# Patient Record
Sex: FEMALE | Race: WHITE | NOT HISPANIC OR LATINO | Employment: FULL TIME | URBAN - METROPOLITAN AREA
[De-identification: names, ages, dates, MRNs, and addresses within clinical notes are randomized per-mention and may not be internally consistent; named-entity substitution may affect disease eponyms.]

---

## 2024-06-05 NOTE — PROGRESS NOTES
"PCP  No primary care provider on file.         CHIEF COMPLAINT:  Bladder prolapse           HISTORY OF PRESENT ILLNESS:    Bladder prolapse    Patient's history was reviewed. This is a 58 y.o. female who presents with bladder prolapse. She has a history of JESSY about 2-3 years ago. She then lost weight, which fixed her JESSY problem, however, in February, her JESSY returned due to bladder prolapse. She is splinting for urination and bowel movements. She uses a panty liner daily, but does not soak through. She had a hysterectomy 20 years ago. She has had multiple abdominal surgeries including endometriosis surgery, bowel adhesions, hiatal hernia.       OB History    No obstetric history on file.         Past Medical History:   Diagnosis Date    Personal history of other diseases of the digestive system 11/15/2016    History of intestinal obstruction    Personal history of other diseases of the female genital tract 11/15/2016    History of endometriosis    Personal history of urinary calculi 11/15/2016    History of renal calculi    Unspecified ectopic pregnancy without intrauterine pregnancy ([facility]) 11/15/2016    Ectopic pregnancy       Past Surgical History:   Procedure Laterality Date    HERNIA REPAIR  04/23/2018    Hernia Repair    HYSTERECTOMY  11/15/2016    Hysterectomy    KNEE SURGERY  11/15/2016    Knee Surgery    OTHER SURGICAL HISTORY  07/24/2018    Esophagogastric Fundoplasty Nissen Fundoplication Robotic-Assisted       No family history on file.    Review of Systems   Endocrine: Positive for cold intolerance and heat intolerance.   Neurological:  Positive for dizziness.   Psychiatric/Behavioral:          Depression   All other systems reviewed and are negative.              PHYSICAL EXAMINATION:  No LMP recorded.  Body mass index is 23.86 kg/m².  Visit Vitals  /76   Pulse 73   Ht 1.626 m (5' 4\")   Wt 63 kg (139 lb)   BMI 23.86 kg/m²   BSA 1.69 m²     NP: Constitutional: alert and in no acute distress, " well developed, well nourished.   Head and Face: head and face: unremarkable.   Neck: no neck asymmetry, supple.   Pulmonary: no respiratory distress, unremarkable respiratory effort.   Skin: normal skin color and pigmentation, normal skin turgor, and no rash.   Neurologic: cranial nerves: non-focal, grossly intact.   Psychiatric: alert and oriented x 3.     Pelvic:  Sexual Maturity: Normal.   External Genitalia: Unremarkable.   Urethra: Hypermobile. -CST when sitting.   Vagina:   POP-Q: Anterior prolapse, as well as, apical and posterior prolapse.  Aa: +3 Ba: +3 C: -2.5 Gh: 4.5 Pb: 2.5 TVL: 8 Ap: -1.5 Bp: -1.5 D: -3.5  Rectum: Unremarkable.   Anus: Unremarkable.   Perianal area: Unremarkable.       Urine dip:   Recent Results (from the past 6 hour(s))   POCT UA Automated manually resulted    Collection Time: 06/07/24  3:34 PM   Result Value Ref Range    POC Color, Urine Yellow Straw, Yellow, Light-Yellow    POC Appearance, Urine Clear Clear    POC Glucose, Urine NEGATIVE NEGATIVE mg/dl    POC Bilirubin, Urine NEGATIVE NEGATIVE    POC Ketones, Urine NEGATIVE NEGATIVE mg/dl    POC Specific Gravity, Urine 1.025 1.005 - 1.035    POC Blood, Urine NEGATIVE NEGATIVE    POC PH, Urine 5.5 No Reference Range Established PH    POC Protein, Urine NEGATIVE NEGATIVE, 30 (1+) mg/dl    POC Urobilinogen, Urine 0.2 0.2, 1.0 EU/DL    Poc Nitrite, Urine NEGATIVE NEGATIVE    POC Leukocytes, Urine NEGATIVE NEGATIVE           IMPRESSION AND PLAN:  Alisha Springer is a 58 y.o.  who presents with bladder prolapse.     Problem List Items Addressed This Visit         Female genital prolapse, unspecified type        Relevant Orders    Post-Void Residual    POCT UA Automated manually resulted (Completed)    Mixed stress and urge urinary incontinence        Relevant Orders    Urine Culture           We discussed observation/conservative therapy/ pessary/ PFPT and surgical options. Vaginal approach and robotic abdominal approaches were discussed.  Due to the multiple abdominal surgeries, the vaginal extraperitoneal repair with SSLF, APR is likely to be the safest option. I do agree with the recommendation she had at [facility] with DR. Guerrero.   I would like her to have urodynamic testing prior to surgery.     I asked her to follow up (telehealth) with myself after urodynamic testing for re-evaluation or sooner as needed.    All questions and concerns were answered and addressed.  The patient expressed understanding and agrees with the plan.       SIGNATURES  Scribe Attestation  By signing my name below, IMarcella Scrlupe   attest that this documentation has been prepared under the direction and in the presence of José Luis Mccray MD.

## 2024-06-07 ENCOUNTER — OFFICE VISIT (OUTPATIENT)
Dept: UROLOGY | Facility: CLINIC | Age: 59
End: 2024-06-07
Payer: COMMERCIAL

## 2024-06-07 VITALS
HEIGHT: 64 IN | DIASTOLIC BLOOD PRESSURE: 76 MMHG | HEART RATE: 73 BPM | SYSTOLIC BLOOD PRESSURE: 119 MMHG | BODY MASS INDEX: 23.73 KG/M2 | WEIGHT: 139 LBS

## 2024-06-07 DIAGNOSIS — N81.9 FEMALE GENITAL PROLAPSE, UNSPECIFIED TYPE: ICD-10-CM

## 2024-06-07 DIAGNOSIS — N39.46 MIXED STRESS AND URGE URINARY INCONTINENCE: ICD-10-CM

## 2024-06-07 LAB
POC APPEARANCE, URINE: CLEAR
POC BILIRUBIN, URINE: NEGATIVE
POC BLOOD, URINE: NEGATIVE
POC COLOR, URINE: YELLOW
POC GLUCOSE, URINE: NEGATIVE MG/DL
POC KETONES, URINE: NEGATIVE MG/DL
POC LEUKOCYTES, URINE: NEGATIVE
POC NITRITE,URINE: NEGATIVE
POC PH, URINE: 5.5 PH
POC PROTEIN, URINE: NEGATIVE MG/DL
POC SPECIFIC GRAVITY, URINE: 1.02
POC UROBILINOGEN, URINE: 0.2 EU/DL

## 2024-06-07 PROCEDURE — 81003 URINALYSIS AUTO W/O SCOPE: CPT | Performed by: UROLOGY

## 2024-06-07 PROCEDURE — 51798 US URINE CAPACITY MEASURE: CPT | Performed by: UROLOGY

## 2024-06-07 PROCEDURE — 99204 OFFICE O/P NEW MOD 45 MIN: CPT | Performed by: UROLOGY

## 2024-06-07 RX ORDER — MOMETASONE FUROATE 50 UG/1
2 SPRAY, METERED NASAL DAILY
COMMUNITY

## 2024-06-07 RX ORDER — MONTELUKAST SODIUM 4 MG/1
4 TABLET, CHEWABLE ORAL DAILY
COMMUNITY

## 2024-06-07 ASSESSMENT — ENCOUNTER SYMPTOMS: DIZZINESS: 1

## 2024-06-12 ENCOUNTER — LAB (OUTPATIENT)
Dept: LAB | Facility: LAB | Age: 59
End: 2024-06-12
Payer: COMMERCIAL

## 2024-06-12 DIAGNOSIS — N39.46 MIXED STRESS AND URGE URINARY INCONTINENCE: ICD-10-CM

## 2024-06-12 PROCEDURE — 87086 URINE CULTURE/COLONY COUNT: CPT

## 2024-06-14 LAB — BACTERIA UR CULT: NO GROWTH

## 2024-06-19 ENCOUNTER — APPOINTMENT (OUTPATIENT)
Dept: UROLOGY | Facility: CLINIC | Age: 59
End: 2024-06-19
Payer: COMMERCIAL

## 2024-06-19 DIAGNOSIS — N81.10 BLADDER PROLAPSE, FEMALE, ACQUIRED: ICD-10-CM

## 2024-06-19 DIAGNOSIS — N39.46 MIXED STRESS AND URGE URINARY INCONTINENCE: ICD-10-CM

## 2024-06-19 PROCEDURE — 51797 INTRAABDOMINAL PRESSURE TEST: CPT | Performed by: STUDENT IN AN ORGANIZED HEALTH CARE EDUCATION/TRAINING PROGRAM

## 2024-06-19 PROCEDURE — 51728 CYSTOMETROGRAM W/VP: CPT | Performed by: STUDENT IN AN ORGANIZED HEALTH CARE EDUCATION/TRAINING PROGRAM

## 2024-06-19 PROCEDURE — 51784 ANAL/URINARY MUSCLE STUDY: CPT | Performed by: STUDENT IN AN ORGANIZED HEALTH CARE EDUCATION/TRAINING PROGRAM

## 2024-06-19 PROCEDURE — 51741 ELECTRO-UROFLOWMETRY FIRST: CPT | Performed by: STUDENT IN AN ORGANIZED HEALTH CARE EDUCATION/TRAINING PROGRAM

## 2024-06-19 RX ORDER — MOMETASONE FUROATE AND FORMOTEROL FUMARATE DIHYDRATE 100; 5 UG/1; UG/1
2 AEROSOL RESPIRATORY (INHALATION)
COMMUNITY

## 2024-06-19 NOTE — PROGRESS NOTES
Alisha Springer 58 y.o. female  Procedures:  Patient referred by Dr. Mccray for urodynamics to evaluate mixed incontinence and Bladder prolapse. Dr. Dupree was present in office at time of study. Pre-uroflow was completed today with a pvr of 50 ml. Urine was clear for uti today. Patient did not leak on CLPP/VLPP w/w/o swab reduced. Patient did have low DO during study. Patient had strong urge to void and attempted  w/o success. Patient was moved to [facility] and bladder catheter removed which patient attempted multiple times without success. Patient cath for 601 ml after study.  Patient instructed to increase fluids if she has any burning or blood in urine. Patient made aware she may have blood vaginally/rectally due to swab/abdominal catheter insertion.  Patient understood and consented to urodynamics. Patient will follow up with Dr. Mccray to review results. 06/19/2024/nathen

## 2024-06-24 ENCOUNTER — APPOINTMENT (OUTPATIENT)
Dept: UROLOGY | Facility: CLINIC | Age: 59
End: 2024-06-24
Payer: COMMERCIAL

## 2024-06-24 ENCOUNTER — PREP FOR PROCEDURE (OUTPATIENT)
Dept: UROLOGY | Facility: HOSPITAL | Age: 59
End: 2024-06-24

## 2024-06-24 DIAGNOSIS — N81.10 CYSTOCELE WITH RECTOCELE: Primary | ICD-10-CM

## 2024-06-24 DIAGNOSIS — N81.9 FEMALE GENITAL PROLAPSE, UNSPECIFIED TYPE: Primary | ICD-10-CM

## 2024-06-24 DIAGNOSIS — N81.6 CYSTOCELE WITH RECTOCELE: Primary | ICD-10-CM

## 2024-06-24 PROCEDURE — 51741 ELECTRO-UROFLOWMETRY FIRST: CPT | Performed by: UROLOGY

## 2024-06-24 PROCEDURE — 51797 INTRAABDOMINAL PRESSURE TEST: CPT | Performed by: UROLOGY

## 2024-06-24 PROCEDURE — 99214 OFFICE O/P EST MOD 30 MIN: CPT | Performed by: UROLOGY

## 2024-06-24 PROCEDURE — 51784 ANAL/URINARY MUSCLE STUDY: CPT | Performed by: UROLOGY

## 2024-06-24 PROCEDURE — 51729 CYSTOMETROGRAM W/VP&UP: CPT | Performed by: UROLOGY

## 2024-06-24 RX ORDER — SODIUM CHLORIDE 9 MG/ML
100 INJECTION, SOLUTION INTRAVENOUS CONTINUOUS
OUTPATIENT
Start: 2024-06-24

## 2024-06-24 RX ORDER — CIPROFLOXACIN 2 MG/ML
400 INJECTION, SOLUTION INTRAVENOUS ONCE
OUTPATIENT
Start: 2024-06-24 | End: 2024-06-24

## 2024-06-24 NOTE — PROGRESS NOTES
CHIEF COMPLAINT:    An interactive audio and video telecommunication system which permits real time communications between the patient (at the originating site) and provider (at the distant site) was utilized to provide this telehealth service.    Verbal consent was requested and obtained fromNAME@ on this date,DATE@ , for a telehealth visit.       HISTORY OF PRESENT ILLNESS:    This is a  58 y.o. female who presents for follow-up for UDS review.    On uroflow, voided a total of 102 cc with a peak flow of 10 cc/s and a continuous flow.  She left the residual of 50 cc in her bladder.  On CMG, patient had the first desire to void at 147 cc, strong desire at 340 cc, capacity of 601 cc.  Patient did not leak with swab reduction during filling phase.  At capacity she was given the permission to void, she did not void despite multiple attempts and removals of the catheters.  She did Valsalva but she did not have a bladder contraction.  She was catheterized for 601 cc.  This urodynamic shows near complete bladder emptying on uroflow with an acceptable yet prolonged and slow flow.  On the pressure flow study, patient was not able to void despite multiple attempts there was evidence of Valsalva but at the same time there was significant increase in pelvic floor activity which suggest a dysfunctional voiding that could be situational based on a good uroflow.    Clinically she reports having remote NATALIE that improved upon weight loss. She experienced UUI once the POP occurred.       Review of Systems   All other systems reviewed and are negative.      IMPRESSION AND PLAN:      Alisha Springer is a 58 y.o. who presents with  POP     Aa: +3 Ba: +3 C: -2.5 Gh: 4.5 Pb: 2.5 TVL: 8 Ap: -1.5 Bp: -1.5 D: -3.5   We discussed observation/conservative therapy/ pessary/ PFPT and surgical options. Vaginal approach and robotic abdominal approaches were discussed. Due to the multiple abdominal surgeries, the vaginal extraperitoneal repair with  SSLF, APR is likely to be the safest option   We discussed plan for vaginal SSLF, APR to be done in the next few weeks. We answered her questions about the procedure. Since the UDS did not demonstrate NATALIE, we will observe for occurrence of UI following surgery and we will address if it occurs and if bothersome.     All questions and concerns were answered and addressed.  The patient expressed understanding and agrees with the plan.       SIGNATURES  José Luis Mccray MD  [phone]

## 2024-06-24 NOTE — H&P (VIEW-ONLY)
CHIEF COMPLAINT:    An interactive audio and video telecommunication system which permits real time communications between the patient (at the originating site) and provider (at the distant site) was utilized to provide this telehealth service.    Verbal consent was requested and obtained fromNAME@ on this date,DATE@ , for a telehealth visit.       HISTORY OF PRESENT ILLNESS:    This is a  58 y.o. female who presents for follow-up for UDS review.    On uroflow, voided a total of 102 cc with a peak flow of 10 cc/s and a continuous flow.  She left the residual of 50 cc in her bladder.  On CMG, patient had the first desire to void at 147 cc, strong desire at 340 cc, capacity of 601 cc.  Patient did not leak with swab reduction during filling phase.  At capacity she was given the permission to void, she did not void despite multiple attempts and removals of the catheters.  She did Valsalva but she did not have a bladder contraction.  She was catheterized for 601 cc.  This urodynamic shows near complete bladder emptying on uroflow with an acceptable yet prolonged and slow flow.  On the pressure flow study, patient was not able to void despite multiple attempts there was evidence of Valsalva but at the same time there was significant increase in pelvic floor activity which suggest a dysfunctional voiding that could be situational based on a good uroflow.    Clinically she reports having remote NATALIE that improved upon weight loss. She experienced UUI once the POP occurred.       Review of Systems   All other systems reviewed and are negative.      IMPRESSION AND PLAN:      Alisha Springer is a 58 y.o. who presents with  POP     Aa: +3 Ba: +3 C: -2.5 Gh: 4.5 Pb: 2.5 TVL: 8 Ap: -1.5 Bp: -1.5 D: -3.5   We discussed observation/conservative therapy/ pessary/ PFPT and surgical options. Vaginal approach and robotic abdominal approaches were discussed. Due to the multiple abdominal surgeries, the vaginal extraperitoneal repair with  SSLF, APR is likely to be the safest option   We discussed plan for vaginal SSLF, APR to be done in the next few weeks. We answered her questions about the procedure. Since the UDS did not demonstrate NATALIE, we will observe for occurrence of UI following surgery and we will address if it occurs and if bothersome.     All questions and concerns were answered and addressed.  The patient expressed understanding and agrees with the plan.       SIGNATURES  José Luis Mccray MD  [phone]

## 2024-07-02 ENCOUNTER — LAB (OUTPATIENT)
Dept: LAB | Facility: LAB | Age: 59
End: 2024-07-02
Payer: COMMERCIAL

## 2024-07-02 ENCOUNTER — PRE-ADMISSION TESTING (OUTPATIENT)
Dept: PREADMISSION TESTING | Facility: HOSPITAL | Age: 59
End: 2024-07-02
Payer: COMMERCIAL

## 2024-07-02 VITALS
TEMPERATURE: 97.2 F | OXYGEN SATURATION: 99 % | BODY MASS INDEX: 24.65 KG/M2 | DIASTOLIC BLOOD PRESSURE: 56 MMHG | WEIGHT: 139.11 LBS | RESPIRATION RATE: 16 BRPM | HEART RATE: 73 BPM | SYSTOLIC BLOOD PRESSURE: 107 MMHG | HEIGHT: 63 IN

## 2024-07-02 DIAGNOSIS — N81.6 CYSTOCELE WITH RECTOCELE: Primary | ICD-10-CM

## 2024-07-02 DIAGNOSIS — N81.6 CYSTOCELE WITH RECTOCELE: ICD-10-CM

## 2024-07-02 DIAGNOSIS — N81.10 CYSTOCELE WITH RECTOCELE: Primary | ICD-10-CM

## 2024-07-02 DIAGNOSIS — N81.10 CYSTOCELE WITH RECTOCELE: ICD-10-CM

## 2024-07-02 LAB
ANION GAP SERPL CALC-SCNC: 12 MMOL/L (ref 10–20)
BUN SERPL-MCNC: 23 MG/DL (ref 6–23)
CALCIUM SERPL-MCNC: 9.6 MG/DL (ref 8.6–10.3)
CHLORIDE SERPL-SCNC: 104 MMOL/L (ref 98–107)
CO2 SERPL-SCNC: 27 MMOL/L (ref 21–32)
CREAT SERPL-MCNC: 0.57 MG/DL (ref 0.5–1.05)
EGFRCR SERPLBLD CKD-EPI 2021: >90 ML/MIN/1.73M*2
ERYTHROCYTE [DISTWIDTH] IN BLOOD BY AUTOMATED COUNT: 13.2 % (ref 11.5–14.5)
GLUCOSE SERPL-MCNC: 99 MG/DL (ref 74–99)
HCT VFR BLD AUTO: 44.6 % (ref 36–46)
HGB BLD-MCNC: 14.3 G/DL (ref 12–16)
MCH RBC QN AUTO: 30 PG (ref 26–34)
MCHC RBC AUTO-ENTMCNC: 32.1 G/DL (ref 32–36)
MCV RBC AUTO: 94 FL (ref 80–100)
NRBC BLD-RTO: 0 /100 WBCS (ref 0–0)
PLATELET # BLD AUTO: 222 X10*3/UL (ref 150–450)
POTASSIUM SERPL-SCNC: 4 MMOL/L (ref 3.5–5.3)
RBC # BLD AUTO: 4.76 X10*6/UL (ref 4–5.2)
SODIUM SERPL-SCNC: 139 MMOL/L (ref 136–145)
WBC # BLD AUTO: 7.2 X10*3/UL (ref 4.4–11.3)

## 2024-07-02 PROCEDURE — 99202 OFFICE O/P NEW SF 15 MIN: CPT | Performed by: NURSE PRACTITIONER

## 2024-07-02 PROCEDURE — 85027 COMPLETE CBC AUTOMATED: CPT

## 2024-07-02 PROCEDURE — 80048 BASIC METABOLIC PNL TOTAL CA: CPT

## 2024-07-02 PROCEDURE — 36415 COLL VENOUS BLD VENIPUNCTURE: CPT

## 2024-07-02 PROCEDURE — 93010 ELECTROCARDIOGRAM REPORT: CPT | Performed by: INTERNAL MEDICINE

## 2024-07-02 PROCEDURE — 87086 URINE CULTURE/COLONY COUNT: CPT

## 2024-07-02 PROCEDURE — 93005 ELECTROCARDIOGRAM TRACING: CPT

## 2024-07-02 RX ORDER — ZINC GLUCONATE 50 MG
50 TABLET ORAL DAILY
COMMUNITY

## 2024-07-02 RX ORDER — PHENTERMINE HYDROCHLORIDE 37.5 MG/1
37.5 TABLET ORAL
COMMUNITY

## 2024-07-02 RX ORDER — MAGNESIUM HYDROXIDE 400 MG/5ML
1 SUSPENSION, ORAL (FINAL DOSE FORM) ORAL DAILY
COMMUNITY

## 2024-07-02 RX ORDER — MOMETASONE FUROATE AND FORMOTEROL FUMARATE DIHYDRATE 100; 5 UG/1; UG/1
2 AEROSOL RESPIRATORY (INHALATION)
COMMUNITY

## 2024-07-02 RX ORDER — MONTELUKAST SODIUM 10 MG/1
10 TABLET ORAL EVERY MORNING
COMMUNITY

## 2024-07-02 RX ORDER — CHLORHEXIDINE GLUCONATE 40 MG/ML
1 SOLUTION TOPICAL DAILY PRN
COMMUNITY

## 2024-07-02 RX ORDER — ASCORBIC ACID 500 MG
500 TABLET ORAL DAILY
COMMUNITY

## 2024-07-02 RX ORDER — NAPROXEN 500 MG/1
500 TABLET ORAL 2 TIMES DAILY PRN
COMMUNITY

## 2024-07-02 RX ORDER — CHOLECALCIFEROL (VITAMIN D3) 25 MCG
1000 TABLET ORAL DAILY
COMMUNITY

## 2024-07-02 ASSESSMENT — DUKE ACTIVITY SCORE INDEX (DASI)
CAN YOU CLIMB A FLIGHT OF STAIRS OR WALK UP A HILL: YES
CAN YOU PARTICIPATE IN MODERATE RECREATIONAL ACTIVITIES LIKE GOLF, BOWLING, DANCING, DOUBLES TENNIS OR THROWING A BASEBALL OR FOOTBALL: YES
CAN YOU HAVE SEXUAL RELATIONS: NO
CAN YOU PARTICIPATE IN STRENOUS SPORTS LIKE SWIMMING, SINGLES TENNIS, FOOTBALL, BASKETBALL, OR SKIING: YES
CAN YOU DO MODERATE WORK AROUND THE HOUSE LIKE VACUUMING, SWEEPING FLOORS OR CARRYING GROCERIES: YES
CAN YOU WALK INDOORS, SUCH AS AROUND YOUR HOUSE: YES
CAN YOU TAKE CARE OF YOURSELF (EAT, DRESS, BATHE, OR USE TOILET): YES
CAN YOU DO YARD WORK LIKE RAKING LEAVES, WEEDING OR PUSHING A MOWER: YES
DASI METS SCORE: 9.2
CAN YOU DO HEAVY WORK AROUND THE HOUSE LIKE SCRUBBING FLOORS OR LIFTING AND MOVING HEAVY FURNITURE: YES
TOTAL_SCORE: 52.95
CAN YOU RUN A SHORT DISTANCE: YES
CAN YOU WALK A BLOCK OR TWO ON LEVEL GROUND: YES
CAN YOU DO LIGHT WORK AROUND THE HOUSE LIKE DUSTING OR WASHING DISHES: YES

## 2024-07-02 ASSESSMENT — PAIN - FUNCTIONAL ASSESSMENT: PAIN_FUNCTIONAL_ASSESSMENT: 0-10

## 2024-07-02 ASSESSMENT — ENCOUNTER SYMPTOMS
EYES NEGATIVE: 1
CONSTITUTIONAL NEGATIVE: 1
GASTROINTESTINAL NEGATIVE: 1
MUSCULOSKELETAL NEGATIVE: 1
CARDIOVASCULAR NEGATIVE: 1
NECK NEGATIVE: 1

## 2024-07-02 ASSESSMENT — PAIN SCALES - GENERAL: PAINLEVEL_OUTOF10: 0 - NO PAIN

## 2024-07-02 ASSESSMENT — ACTIVITIES OF DAILY LIVING (ADL): ADL_SCORE: 0

## 2024-07-02 ASSESSMENT — LIFESTYLE VARIABLES: SMOKING_STATUS: NONSMOKER

## 2024-07-02 NOTE — PREPROCEDURE INSTRUCTIONS
Medication List            Accurate as of July 2, 2024  8:36 AM. Always use your most recent med list.                ascorbic acid 500 mg tablet  Commonly known as: Vitamin C  Medication Adjustments for Surgery: Stop 7 days before surgery     chlorhexidine 4 % external liquid  Commonly known as: Hibiclens  Notes to patient: Use as instructed     cholecalciferol 25 MCG (1000 UT) tablet  Commonly known as: Vitamin D-3  Medication Adjustments for Surgery: Stop 7 days before surgery     Dulera 100-5 mcg/actuation inhaler  Generic drug: mometasone-formoterol  Notes to patient: Can use the morning of surgery     montelukast 10 mg tablet  Commonly known as: Singulair  Medication Adjustments for Surgery: Take morning of surgery with sip of water, no other fluids     naproxen 500 mg tablet  Commonly known as: Naprosyn  Medication Adjustments for Surgery: Stop 7 days before surgery     phentermine 37.5 mg tablet  Commonly known as: Adipex-P  Medication Adjustments for Surgery: Stop 7 days before surgery     potassium gluconate 595 mg (99 mg) tablet  Medication Adjustments for Surgery: Continue until night before surgery     zinc gluconate 50 mg tablet  Medication Adjustments for Surgery: Stop 7 days before surgery                            PRE-OPERATIVE INSTRUCTIONS    You will receive notification one business day prior to your procedure to confirm your arrival time. It is important that you answer your phone and/or check your messages during this time. If you do not hear from the surgery center by 5 pm. the day before your procedure, please call [phone].     Please enter the building through the Outpatient entrance and take the elevator off the lobby to the 2nd floor then check in at the Outpatient Surgery desk on the 2nd floor.    INSTRUCTIONS:  Talk to your surgeon for instructions if you should stop your aspirin, blood thinner, or diabetes medicines.  DO NOT take any multivitamins or over the counter  supplements for 7-10 days before surgery.  If not being admitted, you must have an adult immediately available to drive you home after surgery. We also highly recommend you have someone stay with you for the entire day and night of your surgery.  For children having surgery, a parent or legal guardian must accompany them to the surgery center. If this is not possible, please call [phone] to make additional arrangements.  For adults who are unable to consent or make medical decisions for themselves, a legal guardian or Power of  must accompany them to the surgery center. If this is not possible, please call [phone] to make additional arrangements.  Wear comfortable, loose fitting clothing.  All jewelry and piercings must be removed. If you are unable to remove an item or have a dermal piercing, please be sure to tell the nurse when you arrive for surgery.  Nail polish and make-up must be removed.  Avoid smoking or consuming alcohol for 24 hours before surgery.  To help prevent infection, please take a shower/bath and wash your hair the night before and/or morning of surgery (or follow other specific bathing instructions provided).    Preoperative Fasting Guidelines    Why must I stop eating and drinking near surgery time?  With sedation, food or liquid in your stomach can enter your lungs causing serious complications  Increases nausea and vomiting    When do I need to stop eating and drinking before my surgery?  Do not eat any solid food after midnight the night before your surgery/procedure unless otherwise instructed by your surgeon.   You may have up to 13.5 ounces of clear liquid until TWO hours before your instructed arrival time to the hospital.  This includes water, black tea/coffee, (no milk or cream) apple juice, and electrolyte drinks (Gatorade).   You may chew gum until TWO hours before your surgery/procedure      If applicable, notify your surgeons office immediately of any new skin  changes that occur to the surgical limb.      If you have any questions or concerns, please call Pre-Admission Testing at [phone].       Home Preoperative Antibacterial Shower with Chlorhexidine gluconate (CHG)     What is a home preoperative antibacterial shower?  This shower is a way of cleaning the skin with a germ killing solution before surgery. The solution contains chlorhexidine gluconate, commonly known as CHG. CHG is a skin cleanser with germ killing ability. Let your doctor know if you are allergic to chlorhexidine.    Why do I need to take a preoperative antibacterial shower?  Skin is not sterile. It is best to try to make your skin as free of germs as possible before surgery. Proper cleansing with a germ killing soap before surgery can lower the number of germs on your skin. This helps to reduce the risk of infection at the surgical site. Following the instructions listed below will help you prepare your skin for surgery.    How do I use the solution?  Begin using your CHG soap the night before and again the morning of your procedure.   Do not shave the day before or day of surgery.  Remove all jewelry until after surgery. Take off rings and take out all body-piercing jewelry.  Wash your face and hair with normal soap and shampoo before you use the CHG soap.  Apply the CHG solution to a clean wet washcloth. Move away from the water to avoid premature rinsing of the CHG soap as you are applying. Firmly lather your entire body from the neck down. Do not use CHG on your face, eyes, ears, or genitals.   Pay special attention to the area where your incisions will be located.  Do not scrub your skin too hard.  It is important to allow the CHG soap to sit on your skin for 3-5 minutes.  Rinse the solution off your body completely. Do not wash with your normal soap after the CHG soap solution.  Pat yourself dry with a clean, soft towel.  Do not apply powders, lotions or deodorants as these might block how  the CHG soap works.   Dress in clean clothing.  Be sure to sleep with clean, freshly laundered sheets.  Be aware that CHG can cause stains on fabric. Rinse your washcloth and other linens that have contact with CHG completely. Use only non-chlorine detergents to launder the items used.

## 2024-07-02 NOTE — CPM/PAT H&P
CPM/PAT Evaluation       Name: Alisha Springer (Alisha Springer)  /Age: 1965/58 y.o.     In-Person       Chief Complaint: bladder prolapse    HPI  This is a very pleasant 59 yo with PMHx of asthma, depression, GERD, renal calculi, rectocele, and cystocele.  Pt states her prolapse is a level 4.  She has significant protrusion of bladder, and rectal pressure.  She has difficulty starting her urinary stream, and it takes a long time to empty the bladder.  She also reports rectal pressure and occasional stool incontinence.  She denies recent fever or chills.    Past Medical History:   Diagnosis Date    Asthma ([facility])     Depression     Endometriosis     Female cystocele     GERD (gastroesophageal reflux disease)     Nephrolithiasis     Personal history of other diseases of the digestive system 11/15/2016    History of intestinal obstruction    Personal history of other diseases of the female genital tract 11/15/2016    History of endometriosis    Personal history of urinary calculi 11/15/2016    History of renal calculi    Rectocele     Thyroid nodule     Unspecified ectopic pregnancy without intrauterine pregnancy ([facility]) 11/15/2016    Ectopic pregnancy       Past Surgical History:   Procedure Laterality Date    ECTOPIC PREGNANCY SURGERY      HERNIA REPAIR  2018    Hernia Repair    HYSTERECTOMY  11/15/2016    Hysterectomy    KNEE SURGERY  11/15/2016    Knee Surgery    OTHER SURGICAL HISTORY  2018    Esophagogastric Fundoplasty Nissen Fundoplication Robotic-Assisted       Patient  reports being sexually active.    Family History   Problem Relation Name Age of Onset    Breast cancer Mother      Heart failure Mother      Hypertension Mother      Heart failure Father      Hypertension Father      Breast cancer Sister      Diabetes Sister      Hypertension Sister      Heart failure Brother      Hypertension Brother      Seizures Child         Allergies   Allergen Reactions    Cephalexin Anaphylaxis and  Quevedo- syndrome    Codeine Diarrhea, Unknown, GI Upset and Nausea/vomiting    Erythromycin Quevedo- syndrome    Penicillins Anaphylaxis and Quevedo- syndrome    Tetracyclines Anaphylaxis, Hives, Shortness of breath and Nausea/vomiting    Bupropion Itching       Prior to Admission medications    Medication Sig Start Date End Date Taking? Authorizing Provider   mometasone (Nasonex) 50 mcg/actuation nasal spray Administer 2 sprays into each nostril once daily.    Historical Provider, MD   mometasone-formoterol (Dulera) 100-5 mcg/actuation inhaler Inhale 2 puffs 2 times a day. Rinse mouth with water after use to reduce aftertaste and incidence of candidiasis. Do not swallow.    Historical Provider, MD   montelukast (Singulair) 4 mg chewable tablet Chew 1 tablet (4 mg) once daily.    Historical Provider, MD CONNELL ROS:   Constitutional:   neg    Neuro/Psych:    Occasional right hand tingling  Eyes:   neg     use of corrective lenses  Ears:   neg    Nose:   neg    Mouth:   neg    Throat:   neg    Neck:   neg    Cardio:   neg    Respiratory:    Asthma good control  Endocrine:    Hx of thyroid nodules  GI:   neg    :    Hx of renal calculi  Musculoskeletal:   neg    Hematologic:   neg    Skin:   Hx of thigh cyst- s/p sepsis 2017        Physical Exam  Constitutional:       Appearance: Normal appearance.   HENT:      Head: Normocephalic and atraumatic.      Nose: Nose normal.      Mouth/Throat:      Mouth: Mucous membranes are moist.   Eyes:      Pupils: Pupils are equal, round, and reactive to light.   Cardiovascular:      Rate and Rhythm: Normal rate and regular rhythm.   Pulmonary:      Effort: Pulmonary effort is normal.      Breath sounds: Normal breath sounds.   Abdominal:      General: Bowel sounds are normal.      Palpations: Abdomen is soft.   Musculoskeletal:      Cervical back: Normal range of motion.      Comments: No ankle edema   Skin:     General: Skin is warm and dry.    Neurological:      General: No focal deficit present.      Mental Status: She is alert and oriented to person, place, and time.   Psychiatric:         Mood and Affect: Mood normal.         Behavior: Behavior normal.        Airway: nl ROM  Anesthesia:  Patient denies any anesthesia complications.   Teeth:intact  ECG: NSR    Visit Vitals  /56   Pulse 73   Temp 36.2 °C (97.2 °F) (Temporal)   Resp 16       DASI Risk Score      Flowsheet Row Most Recent Value   DASI SCORE 52.95   METS Score (Will be calculated only when all the questions are answered) 9.2          Caprini DVT Assessment      Flowsheet Row Most Recent Value   DVT Score 6   Current Status Major surgery planned, lasting 2-3 hours   History Prior major surgery   Age 40-59 years   BMI 30 or less          Modified Frailty Index      Flowsheet Row Most Recent Value   Modified Frailty Index Calculator 0          CHADS2 Stroke Risk  Current as of 3 minutes ago        N/A 3 to 100%: High Risk   2 to < 3%: Medium Risk   0 to < 2%: Low Risk     Last Change: N/A          This score determines the patient's risk of having a stroke if the patient has atrial fibrillation.        This score is not applicable to this patient. Components are not calculated.          Revised Cardiac Risk Index    No data to display       Apfel Simplified Score    No data to display       Risk Analysis Index Results This Encounter         7/2/2024  0833             DIGGS Cancer History: Patient does not indicate history of cancer    Total Risk Analysis Index Score Without Cancer: 17    Total Risk Analysis Index Score: 17          Stop Bang Score      Flowsheet Row Most Recent Value   Do you snore loudly? 0   Do you often feel tired or fatigued after your sleep? 0   Has anyone ever observed you stop breathing in your sleep? 0   Do you have or are you being treated for high blood pressure? 0   Recent BMI (Calculated) 24.7   Is BMI greater than 35 kg/m2? 0=No   Age older than 50 years  old? 1=Yes   Is your neck circumference greater than 17 inches (Male) or 16 inches (Female)? 0   Gender - Male 0=No   STOP-BANG Total Score 1            Assessment and Plan:     Plan for OR on 7/9 for   Vaginal Suspension/Fixation Sacrospinous [84618 (CPT®)] N/A General   Colporrhaphy Anterior and Posterior Vaginal Wall [23602 (CPT®)]     for cystocele and rectocele  CBC, BMP, urine culture    Hx of asthma - good control in current regime

## 2024-07-03 LAB
ATRIAL RATE: 65 BPM
BACTERIA UR CULT: NO GROWTH
P AXIS: 17 DEGREES
P OFFSET: 195 MS
P ONSET: 146 MS
PR INTERVAL: 142 MS
Q ONSET: 217 MS
QRS COUNT: 11 BEATS
QRS DURATION: 96 MS
QT INTERVAL: 428 MS
QTC CALCULATION(BAZETT): 445 MS
QTC FREDERICIA: 439 MS
R AXIS: -10 DEGREES
T AXIS: 3 DEGREES
T OFFSET: 431 MS
VENTRICULAR RATE: 65 BPM

## 2024-07-09 ENCOUNTER — ANESTHESIA (OUTPATIENT)
Dept: OPERATING ROOM | Facility: HOSPITAL | Age: 59
End: 2024-07-09
Payer: COMMERCIAL

## 2024-07-09 ENCOUNTER — ANESTHESIA EVENT (OUTPATIENT)
Dept: OPERATING ROOM | Facility: HOSPITAL | Age: 59
End: 2024-07-09
Payer: COMMERCIAL

## 2024-07-09 ENCOUNTER — HOSPITAL ENCOUNTER (OUTPATIENT)
Facility: HOSPITAL | Age: 59
Setting detail: OUTPATIENT SURGERY
Discharge: HOME | End: 2024-07-09
Attending: UROLOGY | Admitting: UROLOGY
Payer: COMMERCIAL

## 2024-07-09 VITALS
SYSTOLIC BLOOD PRESSURE: 107 MMHG | OXYGEN SATURATION: 96 % | TEMPERATURE: 97.9 F | WEIGHT: 136 LBS | BODY MASS INDEX: 24.1 KG/M2 | HEIGHT: 63 IN | HEART RATE: 70 BPM | DIASTOLIC BLOOD PRESSURE: 61 MMHG | RESPIRATION RATE: 16 BRPM

## 2024-07-09 DIAGNOSIS — N81.6 CYSTOCELE WITH RECTOCELE: Primary | ICD-10-CM

## 2024-07-09 DIAGNOSIS — N81.10 CYSTOCELE WITH RECTOCELE: Primary | ICD-10-CM

## 2024-07-09 PROBLEM — K21.9 GASTROESOPHAGEAL REFLUX DISEASE: Status: ACTIVE | Noted: 2024-07-09

## 2024-07-09 PROBLEM — J45.909 ASTHMA (HHS-HCC): Status: ACTIVE | Noted: 2024-07-09

## 2024-07-09 PROCEDURE — 2500000005 HC RX 250 GENERAL PHARMACY W/O HCPCS: Performed by: NURSE ANESTHETIST, CERTIFIED REGISTERED

## 2024-07-09 PROCEDURE — 2500000004 HC RX 250 GENERAL PHARMACY W/ HCPCS (ALT 636 FOR OP/ED): Performed by: UROLOGY

## 2024-07-09 PROCEDURE — A57260 PR COMBINED ANT/POST COLPORRHAPHY: Performed by: ANESTHESIOLOGY

## 2024-07-09 PROCEDURE — 2500000005 HC RX 250 GENERAL PHARMACY W/O HCPCS: Performed by: UROLOGY

## 2024-07-09 PROCEDURE — 3600000008 HC OR TIME - EACH INCREMENTAL 1 MINUTE - PROCEDURE LEVEL THREE: Performed by: UROLOGY

## 2024-07-09 PROCEDURE — 3700000002 HC GENERAL ANESTHESIA TIME - EACH INCREMENTAL 1 MINUTE: Performed by: UROLOGY

## 2024-07-09 PROCEDURE — 3700000001 HC GENERAL ANESTHESIA TIME - INITIAL BASE CHARGE: Performed by: UROLOGY

## 2024-07-09 PROCEDURE — 2500000005 HC RX 250 GENERAL PHARMACY W/O HCPCS: Performed by: ANESTHESIOLOGY

## 2024-07-09 PROCEDURE — 7100000010 HC PHASE TWO TIME - EACH INCREMENTAL 1 MINUTE: Performed by: UROLOGY

## 2024-07-09 PROCEDURE — 7100000002 HC RECOVERY ROOM TIME - EACH INCREMENTAL 1 MINUTE: Performed by: UROLOGY

## 2024-07-09 PROCEDURE — 2500000004 HC RX 250 GENERAL PHARMACY W/ HCPCS (ALT 636 FOR OP/ED): Performed by: ANESTHESIOLOGY

## 2024-07-09 PROCEDURE — A57260 PR COMBINED ANT/POST COLPORRHAPHY: Performed by: NURSE ANESTHETIST, CERTIFIED REGISTERED

## 2024-07-09 PROCEDURE — 7100000001 HC RECOVERY ROOM TIME - INITIAL BASE CHARGE: Performed by: UROLOGY

## 2024-07-09 PROCEDURE — 57260 CMBN ANT PST COLPRHY: CPT | Performed by: UROLOGY

## 2024-07-09 PROCEDURE — C2631 REP DEV, URINARY, W/O SLING: HCPCS | Performed by: UROLOGY

## 2024-07-09 PROCEDURE — 2500000004 HC RX 250 GENERAL PHARMACY W/ HCPCS (ALT 636 FOR OP/ED): Performed by: NURSE ANESTHETIST, CERTIFIED REGISTERED

## 2024-07-09 PROCEDURE — 2720000007 HC OR 272 NO HCPCS: Performed by: UROLOGY

## 2024-07-09 PROCEDURE — 57282 COLPOPEXY EXTRAPERITONEAL: CPT | Performed by: UROLOGY

## 2024-07-09 PROCEDURE — 7100000009 HC PHASE TWO TIME - INITIAL BASE CHARGE: Performed by: UROLOGY

## 2024-07-09 PROCEDURE — 3600000003 HC OR TIME - INITIAL BASE CHARGE - PROCEDURE LEVEL THREE: Performed by: UROLOGY

## 2024-07-09 RX ORDER — HYDRALAZINE HYDROCHLORIDE 20 MG/ML
5 INJECTION INTRAMUSCULAR; INTRAVENOUS EVERY 30 MIN PRN
Status: DISCONTINUED | OUTPATIENT
Start: 2024-07-09 | End: 2024-07-09 | Stop reason: HOSPADM

## 2024-07-09 RX ORDER — LIDOCAINE HYDROCHLORIDE 20 MG/ML
INJECTION, SOLUTION EPIDURAL; INFILTRATION; INTRACAUDAL; PERINEURAL AS NEEDED
Status: DISCONTINUED | OUTPATIENT
Start: 2024-07-09 | End: 2024-07-09

## 2024-07-09 RX ORDER — ROCURONIUM BROMIDE 50 MG/5 ML
SYRINGE (ML) INTRAVENOUS AS NEEDED
Status: DISCONTINUED | OUTPATIENT
Start: 2024-07-09 | End: 2024-07-09

## 2024-07-09 RX ORDER — IBUPROFEN 600 MG/1
600 TABLET ORAL EVERY 6 HOURS PRN
Qty: 30 TABLET | Refills: 0 | Status: SHIPPED | OUTPATIENT
Start: 2024-07-09

## 2024-07-09 RX ORDER — NEOSTIGMINE METHYLSULFATE 1 MG/ML
INJECTION, SOLUTION INTRAVENOUS AS NEEDED
Status: DISCONTINUED | OUTPATIENT
Start: 2024-07-09 | End: 2024-07-09

## 2024-07-09 RX ORDER — ACETAMINOPHEN 325 MG/1
975 TABLET ORAL ONCE
Status: DISCONTINUED | OUTPATIENT
Start: 2024-07-09 | End: 2024-07-09 | Stop reason: HOSPADM

## 2024-07-09 RX ORDER — DIPHENHYDRAMINE HYDROCHLORIDE 50 MG/ML
12.5 INJECTION INTRAMUSCULAR; INTRAVENOUS ONCE AS NEEDED
Status: DISCONTINUED | OUTPATIENT
Start: 2024-07-09 | End: 2024-07-09 | Stop reason: HOSPADM

## 2024-07-09 RX ORDER — SODIUM CHLORIDE, SODIUM LACTATE, POTASSIUM CHLORIDE, CALCIUM CHLORIDE 600; 310; 30; 20 MG/100ML; MG/100ML; MG/100ML; MG/100ML
INJECTION, SOLUTION INTRAVENOUS CONTINUOUS PRN
Status: DISCONTINUED | OUTPATIENT
Start: 2024-07-09 | End: 2024-07-09

## 2024-07-09 RX ORDER — CIPROFLOXACIN 2 MG/ML
400 INJECTION, SOLUTION INTRAVENOUS ONCE
Status: COMPLETED | OUTPATIENT
Start: 2024-07-09 | End: 2024-07-09

## 2024-07-09 RX ORDER — HYDROMORPHONE HYDROCHLORIDE 0.2 MG/ML
0.2 INJECTION INTRAMUSCULAR; INTRAVENOUS; SUBCUTANEOUS EVERY 5 MIN PRN
Status: DISCONTINUED | OUTPATIENT
Start: 2024-07-09 | End: 2024-07-09 | Stop reason: HOSPADM

## 2024-07-09 RX ORDER — ACETAMINOPHEN 500 MG
1000 TABLET ORAL EVERY 6 HOURS PRN
Qty: 30 TABLET | Refills: 0 | Status: SHIPPED | OUTPATIENT
Start: 2024-07-09

## 2024-07-09 RX ORDER — ONDANSETRON HYDROCHLORIDE 2 MG/ML
INJECTION, SOLUTION INTRAVENOUS AS NEEDED
Status: DISCONTINUED | OUTPATIENT
Start: 2024-07-09 | End: 2024-07-09

## 2024-07-09 RX ORDER — HYDROMORPHONE HYDROCHLORIDE 1 MG/ML
INJECTION, SOLUTION INTRAMUSCULAR; INTRAVENOUS; SUBCUTANEOUS AS NEEDED
Status: DISCONTINUED | OUTPATIENT
Start: 2024-07-09 | End: 2024-07-09

## 2024-07-09 RX ORDER — MIDAZOLAM HYDROCHLORIDE 1 MG/ML
INJECTION, SOLUTION INTRAMUSCULAR; INTRAVENOUS AS NEEDED
Status: DISCONTINUED | OUTPATIENT
Start: 2024-07-09 | End: 2024-07-09

## 2024-07-09 RX ORDER — OXYCODONE HYDROCHLORIDE 5 MG/1
5 TABLET ORAL EVERY 6 HOURS PRN
Qty: 10 TABLET | Refills: 0 | Status: SHIPPED | OUTPATIENT
Start: 2024-07-09

## 2024-07-09 RX ORDER — OXYCODONE HYDROCHLORIDE 5 MG/1
5 TABLET ORAL EVERY 4 HOURS PRN
Status: DISCONTINUED | OUTPATIENT
Start: 2024-07-09 | End: 2024-07-09 | Stop reason: HOSPADM

## 2024-07-09 RX ORDER — OXYCODONE AND ACETAMINOPHEN 5; 325 MG/1; MG/1
1 TABLET ORAL EVERY 4 HOURS PRN
Status: DISCONTINUED | OUTPATIENT
Start: 2024-07-09 | End: 2024-07-09 | Stop reason: HOSPADM

## 2024-07-09 RX ORDER — ONDANSETRON 4 MG/1
4 TABLET, ORALLY DISINTEGRATING ORAL ONCE
Status: COMPLETED | OUTPATIENT
Start: 2024-07-09 | End: 2024-07-09

## 2024-07-09 RX ORDER — LABETALOL HYDROCHLORIDE 5 MG/ML
5 INJECTION, SOLUTION INTRAVENOUS ONCE AS NEEDED
Status: DISCONTINUED | OUTPATIENT
Start: 2024-07-09 | End: 2024-07-09 | Stop reason: HOSPADM

## 2024-07-09 RX ORDER — ALBUTEROL SULFATE 0.83 MG/ML
2.5 SOLUTION RESPIRATORY (INHALATION) ONCE AS NEEDED
Status: DISCONTINUED | OUTPATIENT
Start: 2024-07-09 | End: 2024-07-09 | Stop reason: HOSPADM

## 2024-07-09 RX ORDER — SODIUM CHLORIDE 9 MG/ML
100 INJECTION, SOLUTION INTRAVENOUS CONTINUOUS
Status: DISCONTINUED | OUTPATIENT
Start: 2024-07-09 | End: 2024-07-09 | Stop reason: HOSPADM

## 2024-07-09 RX ORDER — ONDANSETRON HYDROCHLORIDE 2 MG/ML
4 INJECTION, SOLUTION INTRAVENOUS ONCE AS NEEDED
Status: DISCONTINUED | OUTPATIENT
Start: 2024-07-09 | End: 2024-07-09 | Stop reason: HOSPADM

## 2024-07-09 RX ORDER — PROPOFOL 10 MG/ML
INJECTION, EMULSION INTRAVENOUS AS NEEDED
Status: DISCONTINUED | OUTPATIENT
Start: 2024-07-09 | End: 2024-07-09

## 2024-07-09 RX ORDER — SODIUM CHLORIDE, SODIUM LACTATE, POTASSIUM CHLORIDE, CALCIUM CHLORIDE 600; 310; 30; 20 MG/100ML; MG/100ML; MG/100ML; MG/100ML
100 INJECTION, SOLUTION INTRAVENOUS CONTINUOUS
Status: DISCONTINUED | OUTPATIENT
Start: 2024-07-09 | End: 2024-07-09 | Stop reason: HOSPADM

## 2024-07-09 RX ORDER — LIDOCAINE HYDROCHLORIDE 10 MG/ML
0.1 INJECTION INFILTRATION; PERINEURAL ONCE
Status: DISCONTINUED | OUTPATIENT
Start: 2024-07-09 | End: 2024-07-09 | Stop reason: HOSPADM

## 2024-07-09 RX ORDER — GLYCOPYRROLATE 0.2 MG/ML
INJECTION INTRAMUSCULAR; INTRAVENOUS AS NEEDED
Status: DISCONTINUED | OUTPATIENT
Start: 2024-07-09 | End: 2024-07-09

## 2024-07-09 RX ORDER — FENTANYL CITRATE 50 UG/ML
INJECTION, SOLUTION INTRAMUSCULAR; INTRAVENOUS AS NEEDED
Status: DISCONTINUED | OUTPATIENT
Start: 2024-07-09 | End: 2024-07-09

## 2024-07-09 SDOH — HEALTH STABILITY: MENTAL HEALTH: CURRENT SMOKER: 0

## 2024-07-09 ASSESSMENT — PAIN SCALES - GENERAL
PAINLEVEL_OUTOF10: 0 - NO PAIN
PAINLEVEL_OUTOF10: 5 - MODERATE PAIN
PAINLEVEL_OUTOF10: 6
PAINLEVEL_OUTOF10: 5 - MODERATE PAIN
PAINLEVEL_OUTOF10: 0 - NO PAIN
PAINLEVEL_OUTOF10: 8
PAINLEVEL_OUTOF10: 8

## 2024-07-09 ASSESSMENT — PAIN DESCRIPTION - DESCRIPTORS
DESCRIPTORS: BURNING
DESCRIPTORS: ACHING
DESCRIPTORS: BURNING

## 2024-07-09 ASSESSMENT — PAIN - FUNCTIONAL ASSESSMENT
PAIN_FUNCTIONAL_ASSESSMENT: 0-10

## 2024-07-09 ASSESSMENT — COLUMBIA-SUICIDE SEVERITY RATING SCALE - C-SSRS
1. IN THE PAST MONTH, HAVE YOU WISHED YOU WERE DEAD OR WISHED YOU COULD GO TO SLEEP AND NOT WAKE UP?: NO
2. HAVE YOU ACTUALLY HAD ANY THOUGHTS OF KILLING YOURSELF?: NO
6. HAVE YOU EVER DONE ANYTHING, STARTED TO DO ANYTHING, OR PREPARED TO DO ANYTHING TO END YOUR LIFE?: NO

## 2024-07-09 NOTE — OP NOTE
Vaginal Suspension/Fixation Sacrospinous, Colporrhaphy Anterior and Posterior Vaginal Wall Operative Note     Date: 2024  OR Location: [facility] OR    Name: Alisha Springer YOB: 1965, Age: 58 y.o., MRN: [id number], Sex: female    Diagnosis  Pre-op Diagnosis     * Cystocele with rectocele [N81.10, N81.6] Post-op Diagnosis     * Cystocele with rectocele [N81.10, N81.6]     Procedures  Sacrospinous ligament hysteropexy  Anterior repair  Posterior repair  Perinorrhaphy  Cystourethroscopy       Surgeons      * José Luis Mccray - Primary    Resident/Fellow/Other Assistant:  Tisha Ball MD    Procedure Summary  Anesthesia: General  ASA: II  Anesthesia Staff: Anesthesiologist: Talon Little MD  CRNA: SKYLAR Pena-CRNA  Estimated Blood Loss: 50mL  Intra-op Medications:   Administrations occurring from 1230 to 1600 on 24:   Medication Name Total Dose   lidocaine-epinephrine PF (Xylocaine W/EPI) 1 %-1:200,000 injection 12 mL   gelatin sponge,absorb-porcine (Gelfoam) sponge 1 each   ciprofloxacin (Cipro) IVPB 400 mg 400 mg              Anesthesia Record               Intraprocedure I/O Totals          Intake    LR infusion 1100.00 mL    Total Intake 1100 mL       Output    Est. Blood Loss 50 mL    Total Output 50 mL       Net    Net Volume 1050 mL          Specimen: No specimens collected     Staff:   Circulator: Kianna  Scrub Person: Elizabeth         Drains and/or Catheters:   Urethral Catheter Non-latex 16 Fr. (Active)       Findings: Small mobile uterus with no adnexal masses felt on bimanual exam.Good support of all components at conclusion of procedure.  Cystoscopy with no bladder injury or sutures noted.  Bilateral ureteral spill seen. Rectal exam performed at the end of the case and no injury or sutures noted.      Indications: Alisha Springer is an 58 y.o. female who is having surgery for Cystocele with rectocele [N81.10, N81.6].      The patient was seen in the preoperative area. The risks,  benefits, complications, treatment options, non-operative alternatives, expected recovery and outcomes were discussed with the patient. The possibilities of reaction to medication, pulmonary aspiration, injury to surrounding structures, bleeding, recurrent infection, the need for additional procedures, failure to diagnose a condition, and creating a complication requiring transfusion or operation were discussed with the patient. The patient concurred with the proposed plan, giving informed consent.  The site of surgery was properly noted/marked if necessary per policy. The patient has been actively warmed in preoperative area. Preoperative antibiotics have been ordered and given within 1 hours of incision. Venous thrombosis prophylaxis: SCDs    Procedure Details:   After informed consent was obtained, the patient was taken to the operating room where general anesthesia via oral ETT was administered. She was placed in the dorsal high lithotomy position using candy cane stirrups, being careful not to hyperflex or hyperabduct the legs. Arms were kept on arm boards at the sides, SCD stockings were placed, and a time-out was performed. She was then prepped and draped in the usual sterile fashion.  She received cipro for antibiotic prophylaxis due to allergies.    First, a vásquez was placed to gravity and the bladder completely drained. Attention was turned to the anterior wall. Two Allis clamps were placed on the anterior wall, one underneath and proximal to the urethra, and one at the most dependent portion of the cystocele just above the cervix.  1% lidocaine with epinephrine was injected over the anterior vaginal wall up to the apex of the cystocele. A vaginal incision was then performed using a scalpel over the anterior vaginal epithelium up to the apex of the cystocele. Sharp dissection was carried out bilaterally and the perivesical fascia mobilized off of the vaginal epithelium bilaterally. The bladder was  mobilized. Using careful blunt dissection, the paravesical fascia and fibromuscular vaginal wall was further mobilized on the patients right down to the level of the iscial spine. This was easily palpated. The peritoneum was not entered during this dissection. The sacrospinous ligament on the right was easily palpated from its source on the ischial spine as it coursed posteriorly and medially towards the sacrum.  A Capio device was then loaded with an 0 PDS suture and introduced over the sacrospinous ligament. With the ischial spine palpated just laterally and the rectum retracted medially, the Capio device was fired and the PDS suture pulled through the ligament. Good placement was confirmed on palpation. A second suture of the same was then placed 0.5-1cm medial to the previous suture. Good placement was similarly confirmed on palpation.  Small oozing noted and gel foam was placed in dissected sacrospinous space with good hemostasis.    The cystocele repair was then performed with several vertical mattress sutures of 0 Vicryl, reapproximating the defects in the midline.  The PDS sacrospinous sutures were then taken through the cervix using the needles and a free Dunn needle on the free end. A very small amount of vaginal epithelium was excised and the vaginal epithelium was then closed with a running stitch of 0 Vicryl. Hemostasis was satisfactory. The PDS sutures were then tied up to their sacrospinous attachment points with elevation of the vaginal apex to roughly 8cm of total vaginal depth.    Cystoscopy was then performed revealing a normal bladder with bilateral ureteral spill. The bladder was then drained.     Attention was then placed on the posterior repair/ perineorrhaphy. The perineoplasty margins were outlined and injected with 1% lidocaine with epinephrine. A superficial incision was made through the outline with a knife. The perineal area was de-skinned using a knife. The overlying mucosa was then   from the underlying muscles with Metzenbaum scissors. Once the dissection was completed, the muscular tissue was plicated in the midline with a series of figure of eight sutures until the dead space was closed. The vaginal mucosa was then re-approximated with 2-0 Vicryl until the wound was completely closed. Rectal exam was performed and was normal.  Vaginal packing placed.       was present for procedure.  Tisha Ball MD  Urogynecology and Reproductive Pelvic Surgery Fellow    Complications:  None; patient tolerated the procedure well.    Disposition: PACU - hemodynamically stable.  Condition: stable       Attending Attestation:     José Luis Mccray  Phone Number: [phone]

## 2024-07-09 NOTE — DISCHARGE INSTRUCTIONS
DEPARTMENT OF UROLOGY  DISCHARGE INSTRUCTIONS CYSTOSCOPY  Outpatient Surgery    Alisha Springer      Call [phone] during regular daytime business hours (8:00 am - 5:00 pm) and after 5:00 pm ask for the Urology resident with any questions or concerns.          Call your physicians office or go to the nearest emergency room if you have any of the following:   · Fever higher than 100.4 F, chills, sweats.   · Redness, tenderness, increased swelling or drainage at incision.   · Difficulty swallowing or eating.  · Nausea or vomiting.  · Increased pain or pain that is not controlled.   · Increased cough or productive cough of yellow or green colored phlegm.   · Vaginal bleeding soaking more than a pad/hour.  · Any other symptoms that are concerning to you  · Go IMMEDIATELY to the emergency department if you experience shortness of breath    Medications:     For Pain:   · Tylenol (acetaminophen) and ibuprofen are your first line therapies for pain. You can take each of these medications every 6 (six) hours. You can alternate taking doses of these medications so that you have a pain medication available to take every 3 hours. For example, you can take Tylenol at 9am, then ibuprofen at noon, then Tylenol again at 3 pm, etc.  · You have been given a prescription for a narcotic pain medication, oxycodone, to help with postoperative discomfort.  You can take this medication if you have taken Tylenol and ibuprofen and your pain is still greater than a 4 out of 10. The best way to wean off of narcotic pain medications is by alternating them with Ibuprofen and by slowly lengthening the time between your doses of narcotic pain medication.    · You may also try a heating pad to help relieve your pain. Be certain to protect your skin by placing a towel between you and the heating pad. DO NOT fall asleep with the heating pad in place.     For Constipation:   · Narcotic pain medications cause constipation, so you should take  Miralax, once or twice daily as long as you are taking narcotic pain medication.    · If no bowel movement in 48 hours, try Milk of Magnesia.   · Drink 6 glasses of water per day.     Wound Care:   As you heal, your incision will look better and the soreness will go away. You may also feel numbness or a “pins and needle” sensation in the area of your incision.   · You may shower once you return home. Wash the incision(s) gently with soap and water during your regular shower and pat dry with a clean towel.   · DO NOT take a bath or submerge your incision in water (NO swimming or hot tubs) for three (3) weeks.   · Do not put any ointments or creams on your incision for 3 to 4 weeks (they can hinder healing).   · Some vaginal bleeding is normal after vaginal surgery. If you are soaking pads in 1-2 hours, please call the office.       It is very important to keep all of your post operative clinic appointments.        If it is a life-threatening situation, proceed to the nearest emergency department.

## 2024-07-09 NOTE — ANESTHESIA PREPROCEDURE EVALUATION
Patient: Alisha Springer    Procedure Information       Date/Time: 07/09/24 1230    Procedures:       Vaginal Suspension/Fixation Sacrospinous      Colporrhaphy Anterior and Posterior Vaginal Wall    Location: [facility] OR 08 / [facility]    Surgeons: José Luis Mccray MD            Relevant Problems   Pulmonary   (+) Asthma (HHS-HCC)      GI   (+) Gastroesophageal reflux disease       Clinical information reviewed:   Tobacco  Allergies  Meds   Med Hx  Surg Hx   Fam Hx  Soc Hx        NPO Detail:  NPO/Void Status  Date of Last Liquid: 07/09/24  Time of Last Liquid: 0815  Date of Last Solid: 07/08/24  Time of Last Solid: 1200         Physical Exam    Airway  Mallampati: I  TM distance: >3 FB  Neck ROM: full     Cardiovascular   Rhythm: regular  Rate: normal     Dental - normal exam     Pulmonary   Breath sounds clear to auscultation     Abdominal            Anesthesia Plan    History of general anesthesia?: yes  History of complications of general anesthesia?: no    ASA 2     general     The patient is not a current smoker.    intravenous induction   Anesthetic plan and risks discussed with patient.    Plan discussed with CRNA.

## 2024-07-09 NOTE — INTERVAL H&P NOTE
H&P reviewed. The patient was examined and there are no changes to the H&P.  Aa: +3 Ba: +3 C: -2.5 Gh: 4.5 Pb: 2.5 TVL: 8 Ap: -1.5 Bp: -1.5 D: -3.5   UDS: negative for NATALIE    Reviewed plan for:  sacrospinous ligament suspension, AP repair, cysto    Has significant PCN / cephalosporin allergy > clinda    Tisha Ball MD  Urogynecology and Reproductive Pelvic Surgery Fellow

## 2024-07-09 NOTE — ANESTHESIA POSTPROCEDURE EVALUATION
Patient: Alisha Springer    Procedure Summary       Date: 07/09/24 Room / Location: [facility] 08 / [facility]    Anesthesia Start: 1310 Anesthesia Stop: 1511    Procedures:       Vaginal Suspension/Fixation Sacrospinous      Colporrhaphy Anterior and Posterior Vaginal Wall Diagnosis:       Cystocele with rectocele      (Cystocele with rectocele [N81.10, N81.6])    Surgeons: José Luis Mccray MD Responsible Provider: Talon Little MD    Anesthesia Type: general ASA Status: 2            Anesthesia Type: general    Vitals Value Taken Time   /62 07/09/24 1615   Temp 36.2 °C (97.2 °F) 07/09/24 1510   Pulse 68 07/09/24 1623   Resp 15 07/09/24 1623   SpO2 92 % 07/09/24 1623   Vitals shown include unfiled device data.    Anesthesia Post Evaluation    Patient location during evaluation: PACU  Patient participation: complete - patient participated  Level of consciousness: awake and alert  Pain management: satisfactory to patient  Airway patency: patent  Cardiovascular status: acceptable  Respiratory status: acceptable  Hydration status: acceptable  Postoperative Nausea and Vomiting: none        No notable events documented.

## 2024-07-09 NOTE — ANESTHESIA PROCEDURE NOTES
Airway  Date/Time: 7/9/2024 1:20 PM  Urgency: elective    Airway not difficult    Staffing  Performed: CRNA   Authorized by: Talon Little MD    Performed by: SKYLAR Pena-CRNA  Patient location during procedure: OR    Indications and Patient Condition  Indications for airway management: anesthesia  Spontaneous Ventilation: absent  Sedation level: deep  Preoxygenated: yes  Patient position: sniffing  Mask difficulty assessment: 1 - vent by mask    Final Airway Details  Final airway type: endotracheal airway      Successful airway: ETT     Successful intubation technique: direct laryngoscopy  Facilitating devices/methods: intubating stylet  Blade: Sky  Blade size: #3  ETT size (mm): 7.0  Cormack-Lehane Classification: grade IIa - partial view of glottis  Placement verified by: capnometry   Measured from: lips  ETT to lips (cm): 21  Number of attempts at approach: 1

## 2024-07-11 ENCOUNTER — OFFICE VISIT (OUTPATIENT)
Dept: UROLOGY | Facility: CLINIC | Age: 59
End: 2024-07-11
Payer: COMMERCIAL

## 2024-07-11 DIAGNOSIS — N81.6 CYSTOCELE WITH RECTOCELE: ICD-10-CM

## 2024-07-11 DIAGNOSIS — N81.10 CYSTOCELE WITH RECTOCELE: ICD-10-CM

## 2024-07-11 DIAGNOSIS — R33.9 URINARY RETENTION: ICD-10-CM

## 2024-07-11 PROCEDURE — 99024 POSTOP FOLLOW-UP VISIT: CPT | Performed by: NURSE PRACTITIONER

## 2024-07-11 RX ORDER — OXYCODONE HYDROCHLORIDE 5 MG/1
5 TABLET ORAL EVERY 6 HOURS PRN
Qty: 10 TABLET | Refills: 0 | Status: CANCELLED | OUTPATIENT
Start: 2024-07-11

## 2024-07-11 NOTE — TELEPHONE ENCOUNTER
Patient contacted office via [employer] to request an extension on oxycodone 5 mg for pain post operatively follow her vaginal sacrospinous fixation. She complains pain in her buttocks and would like a refill on PRN medication for over the weekend. Order pended for provider approval.

## 2024-07-11 NOTE — PROGRESS NOTES
Patient visited our clinic today for a trail of void after a 12 fr vásquez catheter was placed post operatively following her vaginal sacrospinous fixation procedure on 7/9/24.  180 ml of sterile water placed into bladder via vásquez catheter using sterile technique.  Vásquez catheter removed. Patient urinated 150 ml of fluid and successfully completed trail of void. Patient tolerated procedure well.  I educated her on symptoms of urinary retention and advised her to go to the emergency room if symptoms arise. Patient expressed understanding. She is scheduled to follow up on 8/1.

## 2024-08-01 ENCOUNTER — APPOINTMENT (OUTPATIENT)
Dept: UROLOGY | Facility: CLINIC | Age: 59
End: 2024-08-01
Payer: COMMERCIAL

## 2024-08-01 VITALS — SYSTOLIC BLOOD PRESSURE: 112 MMHG | DIASTOLIC BLOOD PRESSURE: 71 MMHG | HEART RATE: 76 BPM | TEMPERATURE: 98.8 F

## 2024-08-01 DIAGNOSIS — N40.1 BENIGN PROSTATIC HYPERPLASIA WITH URINARY FREQUENCY: ICD-10-CM

## 2024-08-01 DIAGNOSIS — N39.46 MIXED STRESS AND URGE URINARY INCONTINENCE: ICD-10-CM

## 2024-08-01 DIAGNOSIS — N81.10 BLADDER PROLAPSE, FEMALE, ACQUIRED: Primary | ICD-10-CM

## 2024-08-01 DIAGNOSIS — R35.0 BENIGN PROSTATIC HYPERPLASIA WITH URINARY FREQUENCY: ICD-10-CM

## 2024-08-01 PROCEDURE — 99024 POSTOP FOLLOW-UP VISIT: CPT | Performed by: NURSE PRACTITIONER

## 2024-08-01 RX ORDER — CLINDAMYCIN PHOSPHATE 10 UG/ML
LOTION TOPICAL 2 TIMES DAILY
COMMUNITY
Start: 2024-07-03

## 2024-08-01 RX ORDER — CHLORHEXIDINE GLUCONATE 4 %
LIQUID (ML) TOPICAL DAILY PRN
COMMUNITY
Start: 2024-07-03

## 2024-08-01 NOTE — PROGRESS NOTES
Subjective   Patient ID: Alisha Springer is a 58 y.o. female who presents for Follow-up.  S/p Vaginal Suspension/Fixation Sacrospinous and General Colporrhaphy Anterior and Posterior Vaginal Wall on 7/9. Patient has had 2 episodes of NATALIE related leakage over the last few weeks. Still having spotting, sometimes filling panty liner.  Denies SS of UTI                      Review of Systems   All other systems reviewed and are negative.      Objective   Physical Exam  Vitals reviewed.     Alert and oriented x3  Moist mucous membranes  Breathes easily on room air  Abdomen soft, nondistended  No edema  No scleral icterus  No focal neurological deficits  Appears stated age, no acute distress      Assessment/Plan   Diagnoses and all orders for this visit:  Bladder prolapse, female, acquired  Mixed stress and urge urinary incontinence  Benign prostatic hyperplasia with urinary frequency  -     Post-Void Residual    Reiterated activity restrictions.  At this time we will allow for further healing to see if her episodes of incontinence resolved with this.  Patient agreement with plan we will follow-up in 4 to 6 weeks.       SKYLAR Pennington-CNP 08/01/24 5:11 PM

## 2024-08-15 ENCOUNTER — HOSPITAL ENCOUNTER (OUTPATIENT)
Dept: RADIOLOGY | Facility: CLINIC | Age: 59
Discharge: HOME | End: 2024-08-15
Payer: COMMERCIAL

## 2024-08-15 VITALS — WEIGHT: 136.02 LBS | HEIGHT: 63 IN | BODY MASS INDEX: 24.1 KG/M2

## 2024-08-15 DIAGNOSIS — Z12.31 SCREENING MAMMOGRAM FOR BREAST CANCER: ICD-10-CM

## 2024-08-15 PROCEDURE — 77067 SCR MAMMO BI INCL CAD: CPT

## 2024-08-26 ENCOUNTER — HOSPITAL ENCOUNTER (OUTPATIENT)
Dept: RADIOLOGY | Facility: EXTERNAL LOCATION | Age: 59
Discharge: HOME | End: 2024-08-26
Payer: COMMERCIAL

## 2024-08-26 NOTE — PROGRESS NOTES
"CHIEF COMPLAINT:  urinary incontinence       HISTORY OF PRESENT ILLNESS:    S/p Vaginal Suspension/Fixation Sacrospinous and General Colporrhaphy Anterior and Posterior Vaginal Wall on .   NATALIE, OAB urinary incontinence   POP from my 2024 note:  Aa: +3 Ba: +3 C: -2.5 Gh: 4.5 Pb: 2.5 TVL: 8 Ap: -1.5 Bp: -1.5 D: -3.5     Last seen by Yamilet Dinero CNP on 2024 . This is a  58 y.o. female,  who presents for follow-up for urinary incontinence. She has been having muscle spasms. Her pain in buttocks has resolved. She has leakage when she feels excited and cannot control the incontinence once it begins. She does not leak with coughing or sneezing, only when she is an excited state. She notes that it takes awhile for her to completely empty her bladder.  NTFx1-2.  She attempted intercourse last week but found it to be painful and notes that there was blood.     Review of Systems   Genitourinary:         Urinary incontinence    All other systems reviewed and are negative.      PHYSICAL EXAMINATION:  No LMP recorded. Patient is postmenopausal.  Body mass index is 24.87 kg/m².  Visit Vitals  /71   Pulse 79   Temp 35.9 °C (96.6 °F)   Ht 1.575 m (5' 2\")   Wt 61.7 kg (136 lb)   BMI 24.87 kg/m²   OB Status Postmenopausal   Smoking Status Never   BSA 1.64 m²     NP: Constitutional: alert and in no acute distress, well developed, well nourished.   Head and Face: head and face: unremarkable.   Neck: no neck asymmetry, supple.   Pulmonary: no respiratory distress, unremarkable respiratory effort.   Skin: normal skin color and pigmentation, normal skin turgor, and no rash.   Neurologic: cranial nerves: non-focal, grossly intact.   Psychiatric: alert and oriented x 3.     Pelvic:  Sexual Maturity: Normal.   External Genitalia: Unremarkable.   Bladder: 25 cc residual   Cervix:  Aa: -3 Ba: -3 C: -7 Gh: 3 Pb: 3.5 TVL: 8 Ap: -2 Bp: -2   Pelvic Floor/Tenderness: No pain with manual palpation   Rectum: " Unremarkable.   Anus: Unremarkable.   Perianal area: Unremarkable.       PVR (by Ultrasound): 4 ml   Urine dip:   Recent Results (from the past 6 hour(s))   POCT UA Automated manually resulted    Collection Time: 24 10:39 AM   Result Value Ref Range    POC Color, Urine Yellow Straw, Yellow, Light-Yellow    POC Appearance, Urine Clear Clear    POC Glucose, Urine NEGATIVE NEGATIVE mg/dl    POC Bilirubin, Urine NEGATIVE NEGATIVE    POC Ketones, Urine NEGATIVE NEGATIVE mg/dl    POC Specific Gravity, Urine >=1.030 1.005 - 1.035    POC Blood, Urine NEGATIVE NEGATIVE    POC PH, Urine 5.5 No Reference Range Established PH    POC Protein, Urine NEGATIVE NEGATIVE, 30 (1+) mg/dl    POC Urobilinogen, Urine 0.2 0.2, 1.0 EU/DL    Poc Nitrite, Urine NEGATIVE NEGATIVE    POC Leukocytes, Urine NEGATIVE NEGATIVE         IMPRESSION AND PLAN:  Alisha Springer is a 58 y.o.  who presents with urinary incontinence. It is my impression that urinary leakage is due to overactive bladder. I would like her to start taking Vesicare 5 mg daily to decrease urgency symptoms. I advised her to reduce intake of bladder irritants like caffeine and carbonated beverages.  I asked her to follow up with Yamilet Dinero CNP  in 4-6 weeks for re-evaluation or sooner as needed.    All questions and concerns were answered and addressed.  The patient expressed understanding and agrees with the plan.       SIGNATURES  Scribe Attestation  By signing my name below, IMarcella Scrlupe   attest that this documentation has been prepared under the direction and in the presence of Kady Yip MD.

## 2024-08-27 ENCOUNTER — APPOINTMENT (OUTPATIENT)
Dept: UROLOGY | Facility: CLINIC | Age: 59
End: 2024-08-27
Payer: COMMERCIAL

## 2024-08-27 VITALS
BODY MASS INDEX: 25.03 KG/M2 | DIASTOLIC BLOOD PRESSURE: 71 MMHG | WEIGHT: 136 LBS | HEART RATE: 79 BPM | SYSTOLIC BLOOD PRESSURE: 113 MMHG | HEIGHT: 62 IN | TEMPERATURE: 96.6 F

## 2024-08-27 DIAGNOSIS — N39.3 SUI (STRESS URINARY INCONTINENCE, FEMALE): ICD-10-CM

## 2024-08-27 DIAGNOSIS — N32.89 BLADDER SPASMS: ICD-10-CM

## 2024-08-27 LAB
POC APPEARANCE, URINE: CLEAR
POC BILIRUBIN, URINE: NEGATIVE
POC BLOOD, URINE: NEGATIVE
POC COLOR, URINE: YELLOW
POC GLUCOSE, URINE: NEGATIVE MG/DL
POC KETONES, URINE: NEGATIVE MG/DL
POC LEUKOCYTES, URINE: NEGATIVE
POC NITRITE,URINE: NEGATIVE
POC PH, URINE: 5.5 PH
POC PROTEIN, URINE: NEGATIVE MG/DL
POC SPECIFIC GRAVITY, URINE: >=1.03
POC UROBILINOGEN, URINE: 0.2 EU/DL

## 2024-08-27 PROCEDURE — 3008F BODY MASS INDEX DOCD: CPT | Performed by: UROLOGY

## 2024-08-27 PROCEDURE — 51798 US URINE CAPACITY MEASURE: CPT | Performed by: UROLOGY

## 2024-08-27 PROCEDURE — 81003 URINALYSIS AUTO W/O SCOPE: CPT | Performed by: UROLOGY

## 2024-08-27 PROCEDURE — 99024 POSTOP FOLLOW-UP VISIT: CPT | Performed by: UROLOGY

## 2024-08-27 RX ORDER — SOLIFENACIN SUCCINATE 5 MG/1
5 TABLET, FILM COATED ORAL DAILY
Qty: 30 TABLET | Refills: 5 | Status: SHIPPED | OUTPATIENT
Start: 2024-08-27 | End: 2025-02-23

## 2024-09-12 ENCOUNTER — APPOINTMENT (OUTPATIENT)
Dept: UROLOGY | Facility: CLINIC | Age: 59
End: 2024-09-12
Payer: COMMERCIAL

## 2024-10-01 ENCOUNTER — APPOINTMENT (OUTPATIENT)
Dept: UROLOGY | Facility: CLINIC | Age: 59
End: 2024-10-01
Payer: COMMERCIAL

## 2024-10-07 ENCOUNTER — APPOINTMENT (OUTPATIENT)
Dept: PLASTIC SURGERY | Facility: CLINIC | Age: 59
End: 2024-10-07
Payer: COMMERCIAL

## 2024-10-07 VITALS
WEIGHT: 135.2 LBS | DIASTOLIC BLOOD PRESSURE: 69 MMHG | HEIGHT: 62 IN | HEART RATE: 69 BPM | SYSTOLIC BLOOD PRESSURE: 119 MMHG | BODY MASS INDEX: 24.88 KG/M2

## 2024-10-07 DIAGNOSIS — M54.9 BACK PAIN, UNSPECIFIED BACK LOCATION, UNSPECIFIED BACK PAIN LATERALITY, UNSPECIFIED CHRONICITY: ICD-10-CM

## 2024-10-07 DIAGNOSIS — Z87.19 HISTORY OF ABDOMINAL HERNIA: Primary | ICD-10-CM

## 2024-10-07 PROCEDURE — 99204 OFFICE O/P NEW MOD 45 MIN: CPT | Performed by: PHYSICIAN ASSISTANT

## 2024-10-07 PROCEDURE — 3008F BODY MASS INDEX DOCD: CPT | Performed by: PHYSICIAN ASSISTANT

## 2024-10-07 NOTE — PROGRESS NOTES
Department of Plastic and Reconstructive Surgery           Initial Office Consultation    Date: 10/07/24  Primary Care Provider: Jacque Fuchs MD    Subjective   Alisha Springer is a 58 y.o. female who was self-referred for evaluation of excess skin of the abdomen.    She presents today to discuss excess skin of the abdomen. She endorses her highest weight was 304lbs, her lowest after weight loss was 119lbs and her current weight I 135 which she endorses she has been stable at for greater than 1 year. She endorses that since significant weight loss she has loose skin of the abdomen that hangs and causes her rashes and occasional sores. She has complaints of odor coming from the skin folds of hr abdomen. She was seen by her dermatologist for these rashes and skin concerns and was prescribed topical clindamycin and hibiclens for daily use. She has been doing this for 2 years and is continuing to have break through skin irritation despite this management. She endorses in the summer the skin is wet and macerated with odor.     She also notes that with weight loss she has increased hanging of her breasts. She states that this places stress on her back and shoulders causing back and shoulder pain. She has attempted OTC pain medications (tylenol and ibuprofen) and massage without improvement in symptoms. Se has bra strap grooving from having to wear her bras so tight to support the breasts.     PMH: asthma  Surgical Hx: ectopic pregnancy, esohageal fundoplication, hernia repair with mesh. Bladder sling for cystocele, and total hysterectomy   Family Hx: mother BCA, sister BCA, maternal aunt BCA, niece BCA  Smoking: non-smoker      Review of Systems   All other systems have been reviewed with the patient and have been found to be negative with exception to the chief complaint as mentioned in the history of present illness.    ROS: As noted in history of present illness    - CONSTITUTIONAL: Denies weight loss, fever and  chills.  - HEENT: Denies changes in vision and hearing.  - RESPIRATORY: Denies SOB and cough, difficulty breathing  - CV: Denies palpitations and CP  - GI: Denies abdominal pain, nausea, vomiting and diarrhea.  - : Denies dysuria and urinary frequency.  - MSK: Denies myalgia and joint pain.  - SKIN: Denies rash and pruritus.  - NEUROLOGICAL: Denies headache and syncope.      Objective   Vital Signs:   Vitals:    10/07/24 0843   BP: 119/69   Pulse: 69       Gen: interactive and pleasant  Head: NCAT  Eyes: EOMI, PERRLA  Mouth: MMM  Throat: trachea midline  Cor: RRR  Pulm: nonlabored breathing  Abd: s/nt/nd  Neuro: AAOx3  Ext: extremities perfused    Focused exam of the: abdomen  Abdomen with significant excess skin. There are 2 rolls present above and below the umbilicus with laxity of the skin in both the horizontal and vertical dimensions. Grade 3 pannus. There is maceration of the skin of the infrapannicular skin. There is hyperpigmentation of the skin in the infrapannicular fold. No diastasis or hernia notes on exam however exam is confounded by body habitus.      Assessment/Plan     Alisha Springer is a 58 y.o. female who was self-referred for evaluation of excess skin of the abdomen.    She presents for excess skin of the abdomen causing her rashes and occasional sores. Her highest weight prior to weight loss was 304lbs, her lowest was 119lbs and her current is 135lbs where she has been stable for greater than a year. She is managing her rashes with topical clindamycin and hibiclens for daily use as prescirbed by her dermatologist. She endorses she is suffering continue skin irritation and odor despite management. The patient has a hanging apron of skin and fat below the waist.  On exam there are 2 rolls present above and below the umbilicus with laxity of the skin in both the horizontal and vertical dimensions. Grade 3 pannus. There is maceration of the skin of the infrapannicular skin. There is  hyperpigmentation of the skin in the infrapannicular fold. Due to multidimensional skin laxity [id number] abdominoplasty wound improve and restore these findings. She has a history of abdominal hernia and repair, we will perform CT abdomen to evaluate for further hernia and diastasis.      She additionally has complaints of back and shoulder pain due to pendulous breasts following weight loss.  I am going to refer her to our physical therapy colleagues to address this nonsurgically first. We will have her follow up with our office after completion of physical therapy for reevaluation.       Plan:   PT referral   Ct abdomen  Booking sheet to be submitted    I spent 45 minutes with this patient. Greater than 50% of this time was spent in the counselling and/or coordination of care of this patient.  This note was created using voice recognition software and was not corrected for typographical or grammatical errors.    Signature: Yumi Bowles PA-C

## 2024-10-21 ENCOUNTER — APPOINTMENT (OUTPATIENT)
Dept: RADIOLOGY | Facility: CLINIC | Age: 59
End: 2024-10-21
Payer: COMMERCIAL

## 2024-10-23 ENCOUNTER — APPOINTMENT (OUTPATIENT)
Dept: DERMATOLOGY | Facility: CLINIC | Age: 59
End: 2024-10-23
Payer: COMMERCIAL

## 2024-11-05 ENCOUNTER — HOSPITAL ENCOUNTER (OUTPATIENT)
Dept: RADIOLOGY | Facility: CLINIC | Age: 59
Discharge: HOME | End: 2024-11-05
Payer: COMMERCIAL

## 2024-11-05 DIAGNOSIS — Z87.19 HISTORY OF ABDOMINAL HERNIA: ICD-10-CM

## 2024-11-05 PROCEDURE — 74176 CT ABD & PELVIS W/O CONTRAST: CPT

## 2024-11-22 ENCOUNTER — APPOINTMENT (OUTPATIENT)
Dept: PHYSICAL THERAPY | Facility: CLINIC | Age: 59
End: 2024-11-22
Payer: COMMERCIAL

## 2024-12-04 ENCOUNTER — APPOINTMENT (OUTPATIENT)
Dept: PHYSICAL THERAPY | Facility: CLINIC | Age: 59
End: 2024-12-04
Payer: COMMERCIAL

## 2025-01-23 ENCOUNTER — APPOINTMENT (OUTPATIENT)
Dept: UROLOGY | Facility: CLINIC | Age: 60
End: 2025-01-23
Payer: COMMERCIAL

## 2025-02-17 ENCOUNTER — APPOINTMENT (OUTPATIENT)
Dept: PLASTIC SURGERY | Facility: CLINIC | Age: 60
End: 2025-02-17
Payer: COMMERCIAL

## 2025-02-17 DIAGNOSIS — K43.9 VENTRAL HERNIA WITHOUT OBSTRUCTION OR GANGRENE: Primary | ICD-10-CM

## 2025-02-17 DIAGNOSIS — E88.1 LIPODYSTROPHY: ICD-10-CM

## 2025-02-17 DIAGNOSIS — M79.3 PANNICULITIS: ICD-10-CM

## 2025-02-17 PROCEDURE — 99213 OFFICE O/P EST LOW 20 MIN: CPT | Performed by: SURGERY

## 2025-02-17 NOTE — PROGRESS NOTES
Department of Plastic and Reconstructive Surgery           Initial Office Consultation    Date: 02/17/25  Primary Care Provider: Jacque Fuchs MD    Subjective   Alisha Springer is a 59 y.o. female who was self-referred for evaluation of excess skin of the abdomen.    She presents today to discuss excess skin of the abdomen. She endorses her highest weight was 304lbs, her lowest after weight loss was 119lbs and her current weight I 135 which she endorses she has been stable at for greater than 1 year. She endorses that since significant weight loss she has loose skin of the abdomen that hangs and causes her rashes and occasional sores. She has complaints of odor coming from the skin folds of hr abdomen. She was seen by her dermatologist for these rashes and skin concerns and was prescribed topical clindamycin and hibiclens for daily use. She has been doing this for 2 years and is continuing to have break through skin irritation despite this management. She endorses in the summer the skin is wet and macerated with odor.     She also notes that with weight loss she has increased hanging of her breasts. She states that this places stress on her back and shoulders causing back and shoulder pain. She has attempted OTC pain medications (tylenol and ibuprofen) and massage without improvement in symptoms. Se has bra strap grooving from having to wear her bras so tight to support the breasts.     PMH: asthma  Surgical Hx: ectopic pregnancy, esohageal fundoplication, hernia repair with mesh. Bladder sling for cystocele, and total hysterectomy   Family Hx: mother BCA, sister BCA, maternal aunt BCA, niece BCA  Smoking: non-smoker      Review of Systems   All other systems have been reviewed with the patient and have been found to be negative with exception to the chief complaint as mentioned in the history of present illness.    ROS: As noted in history of present illness    - CONSTITUTIONAL: Denies weight loss, fever and  chills.  - HEENT: Denies changes in vision and hearing.  - RESPIRATORY: Denies SOB and cough, difficulty breathing  - CV: Denies palpitations and CP  - GI: Denies abdominal pain, nausea, vomiting and diarrhea.  - : Denies dysuria and urinary frequency.  - MSK: Denies myalgia and joint pain.  - SKIN: Denies rash and pruritus.  - NEUROLOGICAL: Denies headache and syncope.      Objective   Vital Signs:   There were no vitals filed for this visit.      Gen: interactive and pleasant  Head: NCAT  Eyes: EOMI, PERRLA  Mouth: MMM  Throat: trachea midline  Cor: RRR  Pulm: nonlabored breathing  Abd: s/nt/nd  Neuro: AAOx3  Ext: extremities perfused    Focused exam of the: abdomen  Abdomen with significant excess skin. There are 2 rolls present above and below the umbilicus with laxity of the skin in both the horizontal and vertical dimensions. Grade 3 pannus. There is maceration of the skin of the infrapannicular skin. There is hyperpigmentation of the skin in the infrapannicular fold. No diastasis or hernia notes on exam however exam is confounded by body habitus.      Assessment/Plan     Alisha Springer is a 59 y.o. female who was self-referred for evaluation of excess skin of the abdomen.    She presents for excess skin of the abdomen causing her rashes and occasional sores. Her highest weight prior to weight loss was 304lbs, her lowest was 119lbs and her current is 135lbs where she has been stable for greater than a year. She is managing her rashes with topical clindamycin and hibiclens for daily use as prescirbed by her dermatologist. She endorses she is suffering continue skin irritation and odor despite management. The patient has a hanging apron of skin and fat below the level of the inferior pubis ramus. On exam there are 2 rolls present above and below the umbilicus with laxity of the skin in both the horizontal and vertical dimensions. Grade 3 pannus. There is maceration of the skin of the infrapannicular skin. There  is hyperpigmentation of the skin in the infrapannicular fold. Due to multidimensional skin laxity [id number] abdominoplasty wound improve and restore these findings. She has a history of abdominal hernia and repair.    The patient has a hanging apron of skin and fat below the waist.  This extra skin is causing deep sores which have to be treated with oral medicine and affects the patient's ability to complete activities of daily living such as grooming and dressing.  Abdominal panniculectomy would improve and restore these things.    Alisha has a chronic intertrigo and rashing that has been recalcitrant to medical and nonsurgical therapy for over 3 months including antifungals, steroid creams, and antibiotics.  Weight has been stable for the last 6 months.  She practices good hygiene.       She additionally has complaints of back and shoulder pain due to pendulous breasts following weight loss.  I am going to refer her to our physical therapy colleagues to address this nonsurgically first. We will have her follow up with our office after completion of physical therapy for reevaluation.     CT abdomen/pelvis performed on 11/5/2024: This shows a fat-containing ventral abdominal hernia noted through a diastases measuring 1.5 cm, the hernia sac measures 2.3 x 1.1 x 2.7 cm    We discussed the possibility of removing the umbilicus    Plan:   [id number] abdominal panniculectomy  Ventral hernia repair, with mesh      I spent 45 minutes with this patient. Greater than 50% of this time was spent in the counselling and/or coordination of care of this patient.  This note was created using voice recognition software and was not corrected for typographical or grammatical errors.    Signature: Charlie Allison MD

## 2025-02-20 ENCOUNTER — APPOINTMENT (OUTPATIENT)
Dept: UROLOGY | Facility: CLINIC | Age: 60
End: 2025-02-20
Payer: COMMERCIAL

## 2025-02-20 VITALS
DIASTOLIC BLOOD PRESSURE: 70 MMHG | HEART RATE: 76 BPM | SYSTOLIC BLOOD PRESSURE: 136 MMHG | WEIGHT: 135 LBS | BODY MASS INDEX: 24.69 KG/M2

## 2025-02-20 DIAGNOSIS — N81.9 FEMALE GENITAL PROLAPSE, UNSPECIFIED TYPE: ICD-10-CM

## 2025-02-20 LAB
POC APPEARANCE, URINE: CLEAR
POC BILIRUBIN, URINE: NEGATIVE
POC BLOOD, URINE: NEGATIVE
POC COLOR, URINE: YELLOW
POC GLUCOSE, URINE: NEGATIVE MG/DL
POC KETONES, URINE: NEGATIVE MG/DL
POC LEUKOCYTES, URINE: NEGATIVE
POC NITRITE,URINE: NEGATIVE
POC PH, URINE: 5.5 PH
POC PROTEIN, URINE: NEGATIVE MG/DL
POC SPECIFIC GRAVITY, URINE: 1.01
POC UROBILINOGEN, URINE: 0.2 EU/DL

## 2025-02-20 PROCEDURE — 81003 URINALYSIS AUTO W/O SCOPE: CPT | Performed by: UROLOGY

## 2025-02-20 PROCEDURE — 51798 US URINE CAPACITY MEASURE: CPT | Performed by: UROLOGY

## 2025-02-20 PROCEDURE — 99214 OFFICE O/P EST MOD 30 MIN: CPT | Performed by: UROLOGY

## 2025-02-20 NOTE — PROGRESS NOTES
CHIEF COMPLAINT:  urinary incontinence       HISTORY OF PRESENT ILLNESS:  59 y.o.  with Stress urinary incontinence, over active bladder complaints and hx of Vaginal Suspension/Fixation Sacrospinous and General Colporrhaphy Anterior and Posterior Vaginal Wall 2024. Last seen in Office [2024]     S/p Vaginal Suspension/Fixation Sacrospinous and General Colporrhaphy Anterior and Posterior Vaginal Wall on .   NATALIE, OAB urinary incontinence   POP from my note 2024 :   Aa: -3 Ba: -3 C: -7 Gh: 3 Pb: 3.5 TVL: 8 Ap: -2 Bp: -2      The patient  today with recurrence of bladder prolapse complaints since December. She has to splint in order to have a bowel movement or void. She is able to push the prolapse back to avoid the sensation of a bugle. She has pressure anally. She denies any vaginal complaints, no abnormal bleeding or discharge.   She had a hysterectomy 20 years ago. She has had multiple abdominal surgeries including endometriosis surgery, bowel adhesions, hiatal hernia.     She is planning to under go excess skin removal from her abdomen with Dr. Allison.     She notes 1-2 episodes of nocturia but denies any enuresis. She voids 6-7 times during the day. She does endorse some urge incontinence. She also has leaking on laughing, cough or sneezing particularly on a full bladder.    She denies any bowel related complaints, no fecal or flatal incontinence.    She has no other complaints.      From Previous note  POP from my 2024 note:  Aa: +3 Ba: +3 C: -2.5 Gh: 4.5 Pb: 2.5 TVL: 8 Ap: -1.5 Bp: -1.5 D: -3.5     Last seen by Yamilet Dinero CNP on 2024 .   This is a  59 y.o. female,  who presents for follow-up for urinary incontinence. She has been having muscle spasms. Her pain in buttocks has resolved. She has leakage when she feels excited and cannot control the incontinence once it begins. She does not leak with coughing or sneezing, only when she is an excited state. She notes that it  takes awhile for her to completely empty her bladder.  NTFx1-2.  She attempted intercourse last week but found it to be painful and notes that there was blood.         PHYSICAL EXAMINATION:  No LMP recorded. Patient is postmenopausal.  Body mass index is 24.69 kg/m².  Visit Vitals  /70   Pulse 76   Wt 61.2 kg (135 lb)   BMI 24.69 kg/m²   OB Status Postmenopausal   Smoking Status Never   BSA 1.64 m²       Pelvic:  Genitourinary: normal external genitalia, Bartholin's glands negative, Vallonia's glands negative  Urethra  normal meatus, non-tender, no periurethral mass  Vaginal mucosa  normal  Cervix normal  Uterus surgically absent  Adnexae negative nontender, no masses  Atrophy positive    CST negative  Pelvic floor muscle contraction  4/5    POP-Q (in supine position):       Aa 0     Ba 0     C -5              gh 3.5     pb 3.5     tvl 7              Ap -2     Bp -2     D -5    Rectal: no hemorrhoids, fissures or masses        PVR (by Ultrasound):  5 ml   Urine dip:   Recent Results (from the past 6 hours)   POCT UA Automated manually resulted    Collection Time: 02/20/25  9:28 AM   Result Value Ref Range    POC Color, Urine Yellow Straw, Yellow, Light-Yellow    POC Appearance, Urine Clear Clear    POC Glucose, Urine NEGATIVE NEGATIVE mg/dl    POC Bilirubin, Urine NEGATIVE NEGATIVE    POC Ketones, Urine NEGATIVE NEGATIVE mg/dl    POC Specific Gravity, Urine 1.015 1.005 - 1.035    POC Blood, Urine NEGATIVE NEGATIVE    POC PH, Urine 5.5 No Reference Range Established PH    POC Protein, Urine NEGATIVE NEGATIVE mg/dl    POC Urobilinogen, Urine 0.2 0.2, 1.0 EU/DL    Poc Nitrite, Urine NEGATIVE NEGATIVE    POC Leukocytes, Urine NEGATIVE NEGATIVE           IMPRESSION AND PLAN:  Alisha Springer is a 59 y.o.  with urinary incontinence and recurrence of pelvic organ prolapse s/p  Vaginal Suspension/Fixation Sacrospinous and General Colporrhaphy Anterior and Posterior Vaginal Wall 7/2024.    We discussed  observation/conservative therapy with pessary and surgical options. Patient elects to proceed with surgical correction. Vaginal approach was discussed. Due to the multiple abdominal surgeries, the vaginal extraperitoneal repair anterior repair with possible sacrospinous ligament suspension is likely to be the safest option . She is planning to undergo [id number] abdominal panniculectomy and Ventral hernia repair, with mesh with Dr. Allison. We discussed that vaginal surgery will be coordinated and scheduled at the same time.      All questions and concerns were answered and addressed.  The patient expressed understanding and agrees with the plan.         Tankibe Attestation  By signing my name below, I, Cruz Bermeo attest that this documentation has been prepared under the direction and in the presence of José Luis Mccray MD. All medical record entries made by the Scribe were at my direction or personally dictated by me. I have reviewed the chart and agree that the record accurately reflects my personal performance of the history, physical exam, discussion and plan.

## 2025-03-05 ENCOUNTER — APPOINTMENT (OUTPATIENT)
Facility: CLINIC | Age: 60
End: 2025-03-05
Payer: COMMERCIAL

## 2025-03-12 ENCOUNTER — PREP FOR PROCEDURE (OUTPATIENT)
Dept: UROLOGY | Facility: HOSPITAL | Age: 60
End: 2025-03-12
Payer: COMMERCIAL

## 2025-03-12 DIAGNOSIS — N81.4 CYSTOCELE WITH PROLAPSE: Primary | ICD-10-CM

## 2025-03-12 RX ORDER — CIPROFLOXACIN 2 MG/ML
400 INJECTION, SOLUTION INTRAVENOUS ONCE
OUTPATIENT
Start: 2025-03-12 | End: 2025-03-12

## 2025-03-19 ENCOUNTER — PREP FOR PROCEDURE (OUTPATIENT)
Dept: OCCUPATIONAL MEDICINE | Facility: HOSPITAL | Age: 60
End: 2025-03-19
Payer: COMMERCIAL

## 2025-04-18 DIAGNOSIS — N81.9 FEMALE GENITAL PROLAPSE, UNSPECIFIED TYPE: ICD-10-CM

## 2025-04-21 ENCOUNTER — APPOINTMENT (OUTPATIENT)
Facility: CLINIC | Age: 60
End: 2025-04-21
Payer: COMMERCIAL

## 2025-04-21 VITALS — HEIGHT: 62 IN | BODY MASS INDEX: 24.84 KG/M2 | WEIGHT: 135 LBS

## 2025-04-21 DIAGNOSIS — L30.4 ERYTHEMA INTERTRIGO: ICD-10-CM

## 2025-04-21 DIAGNOSIS — L98.7 EXCESS SKIN OF ABDOMEN: ICD-10-CM

## 2025-04-21 DIAGNOSIS — K43.9 VENTRAL HERNIA WITHOUT OBSTRUCTION OR GANGRENE: Primary | ICD-10-CM

## 2025-04-21 DIAGNOSIS — M79.3 PANNICULITIS: ICD-10-CM

## 2025-04-21 PROCEDURE — 3008F BODY MASS INDEX DOCD: CPT | Performed by: SURGERY

## 2025-04-21 PROCEDURE — 99213 OFFICE O/P EST LOW 20 MIN: CPT | Performed by: SURGERY

## 2025-04-21 NOTE — PROGRESS NOTES
Department of Plastic and Reconstructive Surgery           I preoperative visit    Date: 04/21/25  Primary Care Provider: Jacque Fuchs MD    Subjective   Alisha Springer is a 59 y.o. female who was previously self-referred for evaluation of excess skin of the abdomen.    She presents today to discuss excess skin of the abdomen. She endorses her highest weight was 304lbs, her lowest after weight loss was 119lbs and her current weight I 135 which she endorses she has been stable at for greater than 1 year. She endorses that since significant weight loss she has loose skin of the abdomen that hangs and causes her rashes and occasional sores. She has complaints of odor coming from the skin folds of hr abdomen. She was seen by her dermatologist for these rashes and skin concerns and was prescribed topical clindamycin and hibiclens for daily use. She has been doing this for 2 years and is continuing to have break through skin irritation despite this management. She endorses in the summer the skin is wet and macerated with odor.     She also notes that with weight loss she has increased hanging of her breasts. She states that this places stress on her back and shoulders causing back and shoulder pain. She has attempted OTC pain medications (tylenol and ibuprofen) and massage without improvement in symptoms. Se has bra strap grooving from having to wear her bras so tight to support the breasts.     PMH: asthma  Surgical Hx: ectopic pregnancy, esohageal fundoplication, hernia repair with mesh. Bladder sling for cystocele, and total hysterectomy   Family Hx: mother BCA, sister BCA, maternal aunt BCA, niece BCA  Smoking: non-smoker      Review of Systems   All other systems have been reviewed with the patient and have been found to be negative with exception to the chief complaint as mentioned in the history of present illness.    ROS: As noted in history of present illness    - CONSTITUTIONAL: Denies weight loss, fever  and chills.  - HEENT: Denies changes in vision and hearing.  - RESPIRATORY: Denies SOB and cough, difficulty breathing  - CV: Denies palpitations and CP  - GI: Denies abdominal pain, nausea, vomiting and diarrhea.  - : Denies dysuria and urinary frequency.  - MSK: Denies myalgia and joint pain.  - SKIN: Denies rash and pruritus.  - NEUROLOGICAL: Denies headache and syncope.      Objective   Vital Signs:   There were no vitals filed for this visit.      Gen: interactive and pleasant  Head: NCAT  Eyes: EOMI, PERRLA  Mouth: MMM  Throat: trachea midline  Cor: RRR  Pulm: nonlabored breathing  Abd: s/nt/nd  Neuro: AAOx3  Ext: extremities perfused    Focused exam of the: abdomen  Abdomen with significant excess skin. There are 2 rolls present above and below the umbilicus with laxity of the skin in both the horizontal and vertical dimensions. Grade 3 pannus. There is maceration of the skin of the infrapannicular skin. There is hyperpigmentation of the skin in the infrapannicular fold. No diastasis or hernia notes on exam however exam is confounded by body habitus.      Assessment/Plan     Alisha Springer is a 59 y.o. female who was self-referred for evaluation of excess skin of the abdomen.    She presents for excess skin of the abdomen causing her rashes and occasional sores. Her highest weight prior to weight loss was 304lbs, her lowest was 119lbs and her current is 135lbs where she has been stable for greater than a year. She is managing her rashes with topical clindamycin and hibiclens for daily use as prescirbed by her dermatologist. She endorses she is suffering continue skin irritation and odor despite management. The patient has a hanging apron of skin and fat below the level of the inferior pubis ramus. On exam there are 2 rolls present above and below the umbilicus with laxity of the skin in both the horizontal and vertical dimensions. Grade 3 pannus. There is maceration of the skin of the infrapannicular skin.  There is hyperpigmentation of the skin in the infrapannicular fold. Due to multidimensional skin laxity [id number] abdominoplasty wound improve and restore these findings. She has a history of abdominal hernia and repair.    The patient has a hanging apron of skin and fat below the waist.  This extra skin is causing deep sores which have to be treated with oral medicine and affects the patient's ability to complete activities of daily living such as grooming and dressing.  Abdominal panniculectomy would improve and restore these things.    Alisha has a chronic intertrigo and rashing that has been recalcitrant to medical and nonsurgical therapy for over 3 months including antifungals, steroid creams, and antibiotics.  Weight has been stable for the last 6 months.  She practices good hygiene.       She additionally has complaints of back and shoulder pain due to pendulous breasts following weight loss.  I am going to refer her to our physical therapy colleagues to address this nonsurgically first. We will have her follow up with our office after completion of physical therapy for reevaluation.     CT abdomen/pelvis performed on 11/5/2024: This shows a fat-containing ventral abdominal hernia noted through a diastases measuring 1.5 cm, the hernia sac measures 2.3 x 1.1 x 2.7 cm    We discussed the possibility of removing the umbilicus    Plan:   [id number] abdominal panniculectomy  Ventral hernia repair, with mesh  Consent obtained      I spent 45 minutes with this patient. Greater than 50% of this time was spent in the counselling and/or coordination of care of this patient.  This note was created using voice recognition software and was not corrected for typographical or grammatical errors.    Signature: Charlie Allison MD

## 2025-04-23 ENCOUNTER — PATIENT MESSAGE (OUTPATIENT)
Dept: UROLOGY | Facility: HOSPITAL | Age: 60
End: 2025-04-23
Payer: COMMERCIAL

## 2025-04-28 ENCOUNTER — PRE-ADMISSION TESTING (OUTPATIENT)
Dept: PREADMISSION TESTING | Facility: HOSPITAL | Age: 60
End: 2025-04-28
Payer: COMMERCIAL

## 2025-04-28 ENCOUNTER — LAB (OUTPATIENT)
Dept: LAB | Facility: HOSPITAL | Age: 60
End: 2025-04-28
Payer: COMMERCIAL

## 2025-04-28 VITALS
RESPIRATION RATE: 18 BRPM | OXYGEN SATURATION: 99 % | TEMPERATURE: 97.7 F | BODY MASS INDEX: 26.22 KG/M2 | WEIGHT: 148 LBS | SYSTOLIC BLOOD PRESSURE: 108 MMHG | DIASTOLIC BLOOD PRESSURE: 68 MMHG | HEART RATE: 85 BPM | HEIGHT: 63 IN

## 2025-04-28 DIAGNOSIS — N81.4 CYSTOCELE WITH PROLAPSE: ICD-10-CM

## 2025-04-28 DIAGNOSIS — N81.4 CYSTOCELE WITH PROLAPSE: Primary | ICD-10-CM

## 2025-04-28 DIAGNOSIS — Z01.818 PREOP EXAMINATION: ICD-10-CM

## 2025-04-28 LAB
ANION GAP SERPL CALC-SCNC: 11 MMOL/L (ref 10–20)
APPEARANCE UR: CLEAR
BILIRUB UR STRIP.AUTO-MCNC: NEGATIVE MG/DL
BUN SERPL-MCNC: 22 MG/DL (ref 6–23)
CALCIUM SERPL-MCNC: 9.2 MG/DL (ref 8.6–10.3)
CHLORIDE SERPL-SCNC: 105 MMOL/L (ref 98–107)
CO2 SERPL-SCNC: 27 MMOL/L (ref 21–32)
COLOR UR: NORMAL
CREAT SERPL-MCNC: 0.78 MG/DL (ref 0.5–1.05)
EGFRCR SERPLBLD CKD-EPI 2021: 88 ML/MIN/1.73M*2
ERYTHROCYTE [DISTWIDTH] IN BLOOD BY AUTOMATED COUNT: 13.6 % (ref 11.5–14.5)
GLUCOSE SERPL-MCNC: 96 MG/DL (ref 74–99)
GLUCOSE UR STRIP.AUTO-MCNC: NORMAL MG/DL
HCT VFR BLD AUTO: 45.9 % (ref 36–46)
HGB BLD-MCNC: 14.8 G/DL (ref 12–16)
KETONES UR STRIP.AUTO-MCNC: NEGATIVE MG/DL
LEUKOCYTE ESTERASE UR QL STRIP.AUTO: NEGATIVE
MCH RBC QN AUTO: 30 PG (ref 26–34)
MCHC RBC AUTO-ENTMCNC: 32.2 G/DL (ref 32–36)
MCV RBC AUTO: 93 FL (ref 80–100)
NITRITE UR QL STRIP.AUTO: NEGATIVE
NRBC BLD-RTO: 0 /100 WBCS (ref 0–0)
PH UR STRIP.AUTO: 6 [PH]
PLATELET # BLD AUTO: 213 X10*3/UL (ref 150–450)
POTASSIUM SERPL-SCNC: 3.8 MMOL/L (ref 3.5–5.3)
PROT UR STRIP.AUTO-MCNC: NEGATIVE MG/DL
RBC # BLD AUTO: 4.94 X10*6/UL (ref 4–5.2)
RBC # UR STRIP.AUTO: NEGATIVE MG/DL
SODIUM SERPL-SCNC: 139 MMOL/L (ref 136–145)
SP GR UR STRIP.AUTO: 1.03
UROBILINOGEN UR STRIP.AUTO-MCNC: NORMAL MG/DL
WBC # BLD AUTO: 7.3 X10*3/UL (ref 4.4–11.3)

## 2025-04-28 PROCEDURE — 85027 COMPLETE CBC AUTOMATED: CPT

## 2025-04-28 PROCEDURE — 99202 OFFICE O/P NEW SF 15 MIN: CPT

## 2025-04-28 PROCEDURE — 80048 BASIC METABOLIC PNL TOTAL CA: CPT

## 2025-04-28 PROCEDURE — 81003 URINALYSIS AUTO W/O SCOPE: CPT

## 2025-04-28 PROCEDURE — 87081 CULTURE SCREEN ONLY: CPT | Mod: STJLAB

## 2025-04-28 RX ORDER — CHLORHEXIDINE GLUCONATE ORAL RINSE 1.2 MG/ML
SOLUTION DENTAL
Qty: 473 ML | Refills: 0 | Status: SHIPPED | OUTPATIENT
Start: 2025-04-28

## 2025-04-28 ASSESSMENT — DUKE ACTIVITY SCORE INDEX (DASI)
CAN YOU RUN A SHORT DISTANCE: YES
CAN YOU PARTICIPATE IN STRENOUS SPORTS LIKE SWIMMING, SINGLES TENNIS, FOOTBALL, BASKETBALL, OR SKIING: YES
CAN YOU HAVE SEXUAL RELATIONS: YES
DASI METS SCORE: 9.9
CAN YOU WALK A BLOCK OR TWO ON LEVEL GROUND: YES
CAN YOU DO MODERATE WORK AROUND THE HOUSE LIKE VACUUMING, SWEEPING FLOORS OR CARRYING GROCERIES: YES
CAN YOU CLIMB A FLIGHT OF STAIRS OR WALK UP A HILL: YES
CAN YOU TAKE CARE OF YOURSELF (EAT, DRESS, BATHE, OR USE TOILET): YES
CAN YOU DO YARD WORK LIKE RAKING LEAVES, WEEDING OR PUSHING A MOWER: YES
TOTAL_SCORE: 58.2
CAN YOU WALK INDOORS, SUCH AS AROUND YOUR HOUSE: YES
CAN YOU PARTICIPATE IN MODERATE RECREATIONAL ACTIVITIES LIKE GOLF, BOWLING, DANCING, DOUBLES TENNIS OR THROWING A BASEBALL OR FOOTBALL: YES
CAN YOU DO HEAVY WORK AROUND THE HOUSE LIKE SCRUBBING FLOORS OR LIFTING AND MOVING HEAVY FURNITURE: YES
CAN YOU DO LIGHT WORK AROUND THE HOUSE LIKE DUSTING OR WASHING DISHES: YES

## 2025-04-28 ASSESSMENT — PAIN - FUNCTIONAL ASSESSMENT: PAIN_FUNCTIONAL_ASSESSMENT: 0-10

## 2025-04-28 ASSESSMENT — LIFESTYLE VARIABLES: SMOKING_STATUS: NONSMOKER

## 2025-04-28 ASSESSMENT — PAIN SCALES - GENERAL: PAINLEVEL_OUTOF10: 0 - NO PAIN

## 2025-04-28 ASSESSMENT — ACTIVITIES OF DAILY LIVING (ADL): ADL_SCORE: 0

## 2025-04-28 NOTE — PREPROCEDURE INSTRUCTIONS
Medication List            Accurate as of April 28, 2025  1:52 PM. Always use your most recent med list.                acetaminophen 500 mg tablet  Commonly known as: Tylenol  Take 2 tablets (1,000 mg) by mouth every 6 hours if needed for mild pain (1 - 3).  Medication Adjustments for Surgery: Take/Use as prescribed     ascorbic acid 500 mg tablet  Commonly known as: Vitamin C  Additional Medication Adjustments for Surgery: Take last dose 7 days before surgery     * chlorhexidine 4 % external liquid  Commonly known as: Hibiclens     * Hibiclens 4 % external liquid  Generic drug: chlorhexidine     * chlorhexidine 0.12 % solution  Commonly known as: Peridex  15 milliliter(s) orally once a day for 2 doses 15 ml  the night before surgery and 15 ml morning of surgery - swish for 30 seconds -DO NOT SWALLOW, SPIT OUT     cholecalciferol 25 mcg (1,000 units) tablet  Commonly known as: Vitamin D-3  Additional Medication Adjustments for Surgery: Take last dose 7 days before surgery     clindamycin 1 % lotion  Commonly known as: Cleocin T  Medication Adjustments for Surgery: Do Not take on the morning of surgery     Dulera 100-5 mcg/actuation inhaler  Generic drug: mometasone-formoterol  Medication Adjustments for Surgery: Take/Use as prescribed     FERROUS SULFATE (BULK) MISC  Medication Adjustments for Surgery: Take last dose 1 day (24 hours) before surgery     ibuprofen 600 mg tablet  Take 1 tablet (600 mg) by mouth every 6 hours if needed for mild pain (1 - 3).  Additional Medication Adjustments for Surgery: Take last dose 7 days before surgery     montelukast 10 mg tablet  Commonly known as: Singulair  Medication Adjustments for Surgery: Take/Use as prescribed     naproxen 500 mg tablet  Commonly known as: Naprosyn  Additional Medication Adjustments for Surgery: Take last dose 7 days before surgery     oxyCODONE 5 mg immediate release tablet  Commonly known as: Roxicodone  Take 1 tablet (5 mg) by mouth every 6 hours  if needed for severe pain (7 - 10).  Additional Medication Adjustments for Surgery: Other (Comment)  Notes to patient: Not taking      phentermine 37.5 mg tablet  Commonly known as: Adipex-P  Additional Medication Adjustments for Surgery: Take last dose 7 days before surgery     potassium gluconate 595 mg (99 mg) tablet  Additional Medication Adjustments for Surgery: Take last dose 7 days before surgery     VITAMIN B-12 ORAL  Additional Medication Adjustments for Surgery: Take last dose 7 days before surgery     zinc gluconate 50 mg tablet  Additional Medication Adjustments for Surgery: Take last dose 7 days before surgery           * This list has 3 medication(s) that are the same as other medications prescribed for you. Read the directions carefully, and ask your doctor or other care provider to review them with you.                                      PRE-OPERATIVE INSTRUCTIONS    You will receive notification one business day prior to your procedure to confirm your arrival time. It is important that you answer your phone and/or check your messages during this time. If you do not hear from the surgery center by 5 pm. the day before your procedure, please call [phone].     Please enter the building through the Outpatient entrance and take the elevator off the lobby to the 2nd floor then check in at the Outpatient Surgery desk on the 2nd floor.    INSTRUCTIONS:  Talk to your surgeon for instructions if you should stop your aspirin, blood thinner, or diabetes medicines.  DO NOT take any multivitamins or over the counter supplements for 7-10 days before surgery.  If not being admitted, you must have an adult immediately available to drive you home after surgery. We also highly recommend you have someone stay with you for the entire day and night of your surgery.  For children having surgery, a parent or legal guardian must accompany them to the surgery center. If this is not possible, please call [phone]  to make additional arrangements.  For adults who are unable to consent or make medical decisions for themselves, a legal guardian or Power of  must accompany them to the surgery center. If this is not possible, please call [phone] to make additional arrangements.  Wear comfortable, loose fitting clothing.  All jewelry and piercings must be removed. If you are unable to remove an item or have a dermal piercing, please be sure to tell the nurse when you arrive for surgery.  Nail polish and make-up must be removed.  Avoid smoking or consuming alcohol for 24 hours before surgery.  To help prevent infection, please take a shower/bath and wash your hair the night before and/or morning of surgery (or follow other specific bathing instructions provided).    Preoperative Fasting Guidelines    Why must I stop eating and drinking near surgery time?  With sedation, food or liquid in your stomach can enter your lungs causing serious complications  Increases nausea and vomiting    When do I need to stop eating and drinking before my surgery?  Do not eat any solid food after midnight the night before your surgery/procedure unless otherwise instructed by your surgeon.   If you take a GLP-1 medication (ex: Trulicity, Ozempic, Mounjaro, Zepbound, Bydyreon, Tanzeun, Saxenda, Victoza, Adlyxin, Rybelus) please follow other specific instructions provided regarding preoperative          fasting.  You may have up to 13.5 ounces of clear liquid until TWO hours before your instructed arrival time to the hospital.  This includes water, black tea/coffee, (no milk or cream) apple juice, and electrolyte drinks (Gatorade).   You may chew gum until TWO hours before your surgery/procedure         If applicable, notify your surgeons office immediately of any new skin changes that occur to the surgical limb.      If you have any questions or concerns, please call Pre-Admission Testing at [phone].  kidney stoney

## 2025-04-28 NOTE — H&P (VIEW-ONLY)
CPM/PAT Evaluation       Name: Alisha Springer (Alisha Springer)  /Age: 1965/59 y.o.     In-Person       Chief Complaint: Bladder prolapse, skin infections     HPI  Pleasant 59-year-old female presents with cystocele with prolapse scheduled for colpopexy sacrospinous, dermatolipectomy, abdomen, repair abdominal wall, repair, hernia, ventral on 2025. States she has had a history of bladder prolapse and it has worsened recently. Endorses difficulty with urinating-especially starting to urinate. Endorses rectal pressure as well. She also has a hernia which she states is very painful and causing her to have some reflux. She is having extra skin removed as she has skin infections frequently since weight loss and extra skin. She recently has been on oral steroids for eczema exacerbation. Denies recent fever/illness/chills.     Medical History[1]    Surgical History[2]    Patient  reports being sexually active.    Family History[3]    Allergies[4]    Prior to Admission medications    Medication Sig Start Date End Date Taking? Authorizing Provider   acetaminophen (Tylenol) 500 mg tablet Take 2 tablets (1,000 mg) by mouth every 6 hours if needed for mild pain (1 - 3). 24   Tisha Thompson MD   ascorbic acid (Vitamin C) 500 mg tablet Take 1 tablet (500 mg) by mouth once daily.    Historical Provider, MD   chlorhexidine (Hibiclens) 4 % external liquid Apply 1 Application topically once daily as needed (with each shower).  Patient not taking: Reported on 2025    Historical Provider, MD   cholecalciferol (Vitamin D-3) 25 MCG (1000 UT) tablet Take 1 tablet (1,000 Units) by mouth once daily.    Historical Provider, MD   clindamycin (Cleocin T) 1 % lotion Apply topically 2 times a day. to affected area 7/3/24   Historical Provider, MD   Hibiclens 4 % external liquid Apply topically once daily as needed. 7/3/24   Historical Provider, MD   ibuprofen 600 mg tablet Take 1 tablet (600 mg) by mouth every 6 hours if  needed for mild pain (1 - 3). 7/9/24   Tisha Thompson MD   mometasone-formoterol (Dulera) 100-5 mcg/actuation inhaler Inhale 2 puffs 2 times a day. Rinse mouth with water after use to reduce aftertaste and incidence of candidiasis. Do not swallow.    Historical Provider, MD   montelukast (Singulair) 10 mg tablet Take 1 tablet (10 mg) by mouth once daily in the morning.    Historical Provider, MD   naproxen (Naprosyn) 500 mg tablet Take 1 tablet (500 mg) by mouth 2 times a day as needed for mild pain (1 - 3).    Historical Provider, MD   oxyCODONE (Roxicodone) 5 mg immediate release tablet Take 1 tablet (5 mg) by mouth every 6 hours if needed for severe pain (7 - 10).  Patient not taking: Reported on 8/1/2024 7/9/24   Tisha Thompson MD   phentermine (Adipex-P) 37.5 mg tablet Take 1 tablet (37.5 mg) by mouth once daily in the morning. Take before meals.    Historical Provider, MD   potassium gluconate 595 mg (99 mg) tablet Take 1 tablet by mouth once daily.    Historical Provider, MD   zinc gluconate 50 mg tablet Take 1 tablet (50 mg of elemental zinc) by mouth once daily.    Historical Provider, MD        Constitutional: Negative for fever, chills, or sweats   ENMT: Negative for nasal discharge, congestion, ear pain, mouth pain, throat pain. Positive for glasses/contacts.   Respiratory: Negative for cough, wheezing, shortness of breath   Cardiac: Negative for chest pain, dyspnea on exertion, palpitations   Gastrointestinal: Negative for nausea, vomiting, diarrhea, constipation, abdominal pain. Positive for chronic constipation.   Genitourinary: Negative for dysuria, flank pain, frequency, hematuria. See HPI   Musculoskeletal: Negative for decreased ROM, pain, swelling, weakness   Neurological: Negative for dizziness, confusion, headache  Psychiatric: Negative for mood changes   Skin: Negative for itching, rash, ulcer. Positive for eczema rash on bilateral hands. Hematologic/Lymph: Negative for bruising, easy  "bleeding  Allergic/Immunologic: Negative itching, sneezing, swelling      Physical Exam  Vitals reviewed.   Constitutional:       Appearance: Normal appearance.   HENT:      Head: Normocephalic.      Mouth/Throat:      Mouth: Mucous membranes are moist.      Pharynx: Oropharynx is clear.   Eyes:      Pupils: Pupils are equal, round, and reactive to light.   Cardiovascular:      Rate and Rhythm: Normal rate and regular rhythm.      Heart sounds: Normal heart sounds.   Pulmonary:      Effort: Pulmonary effort is normal.      Breath sounds: Normal breath sounds.   Abdominal:      General: Bowel sounds are normal.      Palpations: Abdomen is soft.   Musculoskeletal:         General: Normal range of motion.      Cervical back: Normal range of motion.   Skin:     General: Skin is warm and dry.      Comments: Eczema breakout bilateral hands    Neurological:      General: No focal deficit present.      Mental Status: She is alert and oriented to person, place, and time.   Psychiatric:         Mood and Affect: Mood normal.         Behavior: Behavior normal.          PAT AIRWAY:   Airway:     Mallampati::  II    Neck ROM::  Full  normal        Testing/Diagnostic:     Patient Specialist/PCP:   PCP: Dr. Fuchs     Visit Vitals  /68   Pulse 85   Temp 36.5 °C (97.7 °F) (Temporal)   Resp 18   Ht 1.6 m (5' 3\")   Wt 67.1 kg (148 lb)   SpO2 99%   BMI 26.22 kg/m²   OB Status Postmenopausal   Smoking Status Never   BSA 1.73 m²       DASI Risk Score      Flowsheet Row Pre-Admission Testing from 4/28/2025 in [facility] [place] Questionnaire Series Submission from 3/12/2025 in [facility] with Generic Provider Edouard   Can you take care of yourself (eat, dress, bathe, or use toilet)?  2.75 filed at 04/28/2025 1341 2.75  filed at 03/12/2025 1910   Can you walk indoors, such as around your house? 1.75 filed at 04/28/2025 1341 1.75  filed at 03/12/2025 1910   Can you walk a block or two on level ground?  2.75 filed at " 04/28/2025 1341 2.75  filed at 03/12/2025 1910   Can you climb a flight of stairs or walk up a hill? 5.5 filed at 04/28/2025 1341 5.5  filed at 03/12/2025 1910   Can you run a short distance? 8 filed at 04/28/2025 1341 8  filed at 03/12/2025 1910   Can you do light work around the house like dusting or washing dishes? 2.7 filed at 04/28/2025 1341 2.7  filed at 03/12/2025 1910   Can you do moderate work around the house like vacuuming, sweeping floors or carrying groceries? 3.5 filed at 04/28/2025 1341 3.5  filed at 03/12/2025 1910   Can you do heavy work around the house like scrubbing floors or lifting and moving heavy furniture?  8 filed at 04/28/2025 1341 8  filed at 03/12/2025 1910   Can you do yard work like raking leaves, weeding or pushing a mower? 4.5 filed at 04/28/2025 1341 4.5  filed at 03/12/2025 1910   Can you have sexual relations? 5.25 filed at 04/28/2025 1341 5.25  filed at 03/12/2025 1910   Can you participate in moderate recreational activities like golf, bowling, dancing, doubles tennis or throwing a baseball or football? 6 filed at 04/28/2025 1341 6  filed at 03/12/2025 1910   Can you participate in strenous sports like swimming, singles tennis, football, basketball, or skiing? 7.5 filed at 04/28/2025 1341 7.5  filed at 03/12/2025 1910   DASI SCORE 58.2 filed at 04/28/2025 1341 58.2  filed at 03/12/2025 1910   METS Score (Will be calculated only when all the questions are answered) 9.9 filed at 04/28/2025 1341 9.9  filed at 03/12/2025 1910          Caprini DVT Assessment      Flowsheet Row Pre-Admission Testing from 4/28/2025 [place] [facility] [place] Testing from 7/2/2024 in [facility] [place]   DVT Score (IF A SCORE IS NOT CALCULATING, MUST SELECT A BMI TO COMPLETE) 7 filed at 04/28/2025 1340 6 filed at 07/02/2024 0831   Surgical Factors Major surgery planned, lasting over 3 hours filed at 04/28/2025 1340 --   BMI (BMI MUST BE CHOSEN) 30 or less filed at 04/28/2025  1340 30 or less filed at 07/02/2024 0831   RETIRED: Current Status -- Major surgery planned, lasting 2-3 hours filed at 07/02/2024 0831   RETIRED: History -- Prior major surgery filed at 07/02/2024 0831   RETIRED: Age -- 40-59 years filed at 07/02/2024 0831          Modified Frailty Index      Flowsheet Row Pre-Admission Testing from 4/28/2025 in [facility] [place] Pre-Admission Testing from 7/2/2024 in [facility] [place]   Non-independent functional status (problems with dressing, bathing, personal grooming, or cooking) 0 filed at 04/28/2025 1341 0 filed at 07/02/2024 0833   History of diabetes mellitus  0 filed at 04/28/2025 1341 0 filed at 07/02/2024 0833   History of COPD 0 filed at 04/28/2025 1341 0 filed at 07/02/2024 0833   History of CHF No filed at 04/28/2025 1341 No filed at 07/02/2024 0833   History of MI 0 filed at 04/28/2025 1341 0 filed at 07/02/2024 0833   History of Percutaneous Coronary Intervention, Cardiac Surgery, or Angina No filed at 04/28/2025 1341 No filed at 07/02/2024 0833   Hypertension requiring the use of medication  0 filed at 04/28/2025 1341 0 filed at 07/02/2024 0833   Peripheral vascular disease 0 filed at 04/28/2025 1341 0 filed at 07/02/2024 0833   Impaired sensorium (cognitive impairement or loss, clouding, or delirium) 0 filed at 04/28/2025 1341 0 filed at 07/02/2024 0833   TIA or CVA withouy residual deficit 0 filed at 04/28/2025 1341 0 filed at 07/02/2024 0833   Cerebrovascular accident with deficit 0 filed at 04/28/2025 1341 0 filed at 07/02/2024 0833   Modified Frailty Index Calculator 0 filed at 04/28/2025 1341 0 filed at 07/02/2024 0833          [id number] Stroke Risk Points  Current as of just now        N/A 0 to 9 Points:      Last Change: N/A          The [id number] risk score (Lip ANDRY, et al. 2009. © 2010 [employer]) quantifies the risk of stroke for a patient with atrial fibrillation. For patients without atrial fibrillation  or under the age of 18 this score appears as N/A. Higher score values generally indicate higher risk of stroke.        This score is not applicable to this patient. Components are not calculated.          Revised Cardiac Risk Index      Flowsheet Row Pre-Admission Testing from 4/28/2025 in [facility] Pre-Admission Testing from 7/2/2024 in [facility]   High-Risk Surgery (Intraperitoneal, Intrathoracic,Suprainguinal vascular) 0 filed at 04/28/2025 1341 0 filed at 07/02/2024 0833   History of ischemic heart disease (History of MI, History of positive exercuse test, Current chest paint considered due to myocardial ischemia, Use of nitrate therapy, ECG with pathological Q Waves) 0 filed at 04/28/2025 1341 0 filed at 07/02/2024 0833   History of congestive heart failure (pulmonary edemia, bilateral rales or S3 gallop, Paroxysmal nocturnal dyspnea, CXR showing pulmonary vascular redistribution) 0 filed at 04/28/2025 1341 0 filed at 07/02/2024 0833   History of cerebrovascular disease (Prior TIA or stroke) 0 filed at 04/28/2025 1341 0 filed at 07/02/2024 0833   Pre-operative insulin treatment 0 filed at 04/28/2025 1341 0 filed at 07/02/2024 0833   Pre-operative creatinine>2 mg/dl 0 filed at 04/28/2025 1341 --   Revised Cardiac Risk Calculator 0 filed at 04/28/2025 1341 --          Apfel Simplified Score      Flowsheet Row Pre-Admission Testing from 4/28/2025 in [facility] Pre-Admission Testing from 7/2/2024 in [facility] [place]   Smoking status 1 filed at 04/28/2025 1341 1 filed at 07/02/2024 0833   History of motion sickness or PONV  0 filed at 04/28/2025 1341 0 filed at 07/02/2024 0833   Use of postoperative opioids 1 filed at 04/28/2025 1341 --   Gender - Female 1=Yes filed at 04/28/2025 1341 --   Apfel Simplified Score Calculator 3 filed at 04/28/2025 1341 --          Risk Analysis Index Results This Encounter         4/28/2025  1341             Do you live in a  place other than your own home?: 0    When did you begin living in the place you are currently residing?: Greater than one year ago    Any kidney failure, kidney not working well, or seeing a kidney doctor (nephrologist)? If yes, was this for kidney stones or another problem?: 1 Kidney stones    Any history of chronic (long-term) congestive heart failure (CHF)?: 0 No    Any shortness of breath when resting?: 0 No    In the past five years, have you been diagnosed with or treated for cancer?: No    During the last 3 months has it become difficult for you to remember things or organize your thoughts?: 0 No    Have you lost weight of 10 pounds or more in the past 3 months without trying?: 0 No    Do you have any loss of appetitie?: 0 No    Getting Around (Mobility): 0 Can get around without help    Eatin Can plan and prepare own meals    Toiletin Can use toilet without any help    Personal Hygiene (Bathing, Hand Washing, Changing Clothes): 0 Can shower or bathe without any help    DIGGS Cancer History: Patient does not indicate history of cancer    Total Risk Analysis Index Score Without Cancer: 17    Total Risk Analysis Index Score: 17          Stop Bang Score      Flowsheet Row [place] [facility] [place] [facility]12/2025 [facility] with Generic Provider Edouard   Do you snore loudly? 0 filed at 2025 1341 0  filed at 2025 1910   Do you often feel tired or fatigued after your sleep? 0 filed at 2025 1341 0  filed at 2025 1910   Has anyone ever observed you stop breathing in your sleep? 0 filed at 2025 1341 0  filed at 2025 1910   Do you have or are you being treated for high blood pressure? 0 filed at 2025 1341 0  filed at 2025 1910   Recent BMI (Calculated) 26.2 filed at 2025 1341 24.7  filed at 2025 1910   Is BMI greater than 35 kg/m2? 0=No filed at 2025 1341 0=No  filed at  03/12/2025 1910   Age older than 50 years old? 1=Yes filed at 04/28/2025 1341 1=Yes  filed at 03/12/2025 1910   Is your neck circumference greater than 17 inches (Male) or 16 inches (Female)? 0 filed at 04/28/2025 1341 --   Gender - Male 0=No filed at 04/28/2025 1341 0=No  filed at 03/12/2025 1910   STOP-BANG Total Score 1 filed at 04/28/2025 1341 --          Prodigy: High Risk  Total Score: 3              Prodigy Previous Opioid Use Score           ARISCAT Score for Postoperative Pulmonary Complications      Flowsheet Row Pre-Admission Testing from 4/28/2025 in [facility] [place] [place]   Age Calculated Score 3 filed at 04/28/2025 1341   Preoperative SpO2 0 filed at 04/28/2025 1341   Respiratory infection in the last month Either upper or lower (i.e., URI, bronchitis, pneumonia), with fever and antibiotic treatment 0 filed at 04/28/2025 1341   Preoperative anemia (Hgb less than 10 g/dl) 0 filed at 04/28/2025 1341   Surgical incision  0 filed at 04/28/2025 1341   Duration of surgery  23 filed at 04/28/2025 1341   Emergency Procedure  0 filed at 04/28/2025 1341   ARISCAT Total Score  26 filed at 04/28/2025 1341          Stone Perioperative Risk for Myocardial Infarction or Cardiac Arrest (MOUNA)      Flowsheet Row Pre-Admission Testing from 4/28/2025 in [facility] [place] [place]   Calculated Age Score 1.18 filed at 04/28/2025 1342   Functional Status  0 filed at 04/28/2025 1342   ASA Class  -3.29 filed at 04/28/2025 1342   Creatinine 0 filed at 04/28/2025 1342   Type of Procedure  1.13 filed at 04/28/2025 1342   MOUNA Total Score  -6.23 filed at 04/28/2025 1342   MOUNA % 0.2 filed at 04/28/2025 1342            Assessment and Plan:     Assessment and Plan:     Preop:   OR with Dr. Mccray and Dr. Allison on 5/12/25   Labs ordered per Dr. Mccray.   EKG on file from 2/3/25.      Neurologic:   The patient is at an increased risk for post operative delirium secondary to polypharmacy  and type and duration of surgery .  Preoperative brain exercise educational handout provided to patient.  The patient is at an increased risk for perioperative stroke secondary to female sex  and general anesthesia.    Cardiac:  Hypertension: States prior to weight loss. No medications.   Hyperlipidemia: States prior to weight loss   Duke Activity Status Index (DASI)  DASI Score: 58.2   MET Score: 9.9  RCRI  0 which is 3.9% 30 day risk of MACE (risk for cardiac death, nonfatal myocardial infarction, and nonfactal cardiac arrest)  MOUNA score which indicates a   0.2% risk of intraoperative or 30-day postoperative MACE    Pulmonary:   Asthma: Stable with inhalers. Rarely uses rescue inhaler   STOP-BANG score of  1.  Low risk of obstructive sleep apnea.   ARISCAT:   26   points which is a intermediate (13.3%) risk of in-hospital post-op pulmonary complications     GI:  GERD: S/P Neisen fundoplication. States has been pretty stable since this surgery.   Apfel: 3 points 61% risk for post operative N/V    /Renal:   Bladder prolapse: Reason for upcoming procedure.   Nephrolithiasis: No acute concerns. H/O     Neuro-muscular:     Psychiatric:   Anxiety/depression: Stable. No thoughts of harming self/others     Hematologic:   Caprini score 7, patient at high risk for perioperative DVT. Patient provided with VTE education/handout.     Skin check:  Eczema: Bilateral hands. No open areas-recently on oral prednisone for exacerbation.   Patient was instructed to make surgeon aware of any skin changes/concerns prior to surgery.     Anesthesia: No history of anesthesia complications. No anesthesia concerns.      *See risk scores as previously documented   ASA II per anesthesia in Feb. 2025. No acute changes in medical history        [1]   Past Medical History:  Diagnosis Date    Asthma     Chronic pruritic rash in adult     B/L hands    Depression     Endometriosis     Female cystocele     GERD (gastroesophageal reflux disease)     Nephrolithiasis     Personal  history of other diseases of the digestive system 11/15/2016    History of intestinal obstruction    Personal history of other diseases of the female genital tract 11/15/2016    History of endometriosis    Personal history of urinary calculi 11/15/2016    History of renal calculi    Rectocele     Quevedo- syndrome (Multi)     Thyroid nodule     Unspecified ectopic pregnancy without intrauterine pregnancy ([facility]) 11/15/2016    Ectopic pregnancy   [2]   Past Surgical History:  Procedure Laterality Date    BLADDER SURGERY      ECTOPIC PREGNANCY SURGERY      HERNIA REPAIR  04/23/2018    Hernia Repair    HYSTERECTOMY  11/15/2016    Hysterectomy    KNEE SURGERY  11/15/2016    Knee Surgery    OTHER SURGICAL HISTORY  07/24/2018    Esophagogastric Fundoplasty Nissen Fundoplication Robotic-Assisted   [3]   Family History  Problem Relation Name Age of Onset    Breast cancer Mother Ami Go 64    Heart failure Mother Ami Go     Hypertension Mother Ami Go     Cancer Mother Amisimon Go     Heart failure Father Mckinley Go     Hypertension Father Mckinley Go     Breast cancer Sister Ana Beal 64    Diabetes Sister Ana Emigdio     Hypertension Sister Ana Emigdio     Heart failure Brother Jay Go     Hypertension Brother Jayairam Go     Seizures Child      Breast cancer Mother's Sister Sister 62    Breast cancer Niece  44    Cancer Paternal Grandfather Mckinley Go     Cancer Mother's Sister Ivania Lidia     Cancer Mother's Sister Ivania [place]     Cancer Mother's Sister Ivania [place]     Cancer Mother's Sister Ivania [place]     Cancer Mother's Sister Ivania Lidia    [4]   Allergies  Allergen Reactions    Cephalexin Anaphylaxis and Quevedo- syndrome    Codeine Diarrhea, Unknown, GI Upset and Nausea/vomiting    Erythromycin Quevedo- syndrome    Penicillins Anaphylaxis and Quevedo- syndrome    Tetracyclines Anaphylaxis, Hives, Shortness of breath  and Nausea/vomiting    Bupropion Itching

## 2025-04-28 NOTE — CPM/PAT H&P
CPM/PAT Evaluation       Name: Alisha Springer (Alisha Springer)  /Age: 1965/59 y.o.     In-Person       Chief Complaint: Bladder prolapse, skin infections     HPI  Pleasant 59-year-old female presents with cystocele with prolapse scheduled for colpopexy sacrospinous, dermatolipectomy, abdomen, repair abdominal wall, repair, hernia, ventral on 2025. States she has had a history of bladder prolapse and it has worsened recently. Endorses difficulty with urinating-especially starting to urinate. Endorses rectal pressure as well. She also has a hernia which she states is very painful and causing her to have some reflux. She is having extra skin removed as she has skin infections frequently since weight loss and extra skin. She recently has been on oral steroids for eczema exacerbation. Denies recent fever/illness/chills.     Medical History[1]    Surgical History[2]    Patient  reports being sexually active.    Family History[3]    Allergies[4]    Prior to Admission medications    Medication Sig Start Date End Date Taking? Authorizing Provider   acetaminophen (Tylenol) 500 mg tablet Take 2 tablets (1,000 mg) by mouth every 6 hours if needed for mild pain (1 - 3). 24   Tisha Thompson MD   ascorbic acid (Vitamin C) 500 mg tablet Take 1 tablet (500 mg) by mouth once daily.    Historical Provider, MD   chlorhexidine (Hibiclens) 4 % external liquid Apply 1 Application topically once daily as needed (with each shower).  Patient not taking: Reported on 2025    Historical Provider, MD   cholecalciferol (Vitamin D-3) 25 MCG (1000 UT) tablet Take 1 tablet (1,000 Units) by mouth once daily.    Historical Provider, MD   clindamycin (Cleocin T) 1 % lotion Apply topically 2 times a day. to affected area 7/3/24   Historical Provider, MD   Hibiclens 4 % external liquid Apply topically once daily as needed. 7/3/24   Historical Provider, MD   ibuprofen 600 mg tablet Take 1 tablet (600 mg) by mouth every 6 hours if  needed for mild pain (1 - 3). 7/9/24   Tisha Thompson MD   mometasone-formoterol (Dulera) 100-5 mcg/actuation inhaler Inhale 2 puffs 2 times a day. Rinse mouth with water after use to reduce aftertaste and incidence of candidiasis. Do not swallow.    Historical Provider, MD   montelukast (Singulair) 10 mg tablet Take 1 tablet (10 mg) by mouth once daily in the morning.    Historical Provider, MD   naproxen (Naprosyn) 500 mg tablet Take 1 tablet (500 mg) by mouth 2 times a day as needed for mild pain (1 - 3).    Historical Provider, MD   oxyCODONE (Roxicodone) 5 mg immediate release tablet Take 1 tablet (5 mg) by mouth every 6 hours if needed for severe pain (7 - 10).  Patient not taking: Reported on 8/1/2024 7/9/24   Tisha Thompson MD   phentermine (Adipex-P) 37.5 mg tablet Take 1 tablet (37.5 mg) by mouth once daily in the morning. Take before meals.    Historical Provider, MD   potassium gluconate 595 mg (99 mg) tablet Take 1 tablet by mouth once daily.    Historical Provider, MD   zinc gluconate 50 mg tablet Take 1 tablet (50 mg of elemental zinc) by mouth once daily.    Historical Provider, MD        Constitutional: Negative for fever, chills, or sweats   ENMT: Negative for nasal discharge, congestion, ear pain, mouth pain, throat pain. Positive for glasses/contacts.   Respiratory: Negative for cough, wheezing, shortness of breath   Cardiac: Negative for chest pain, dyspnea on exertion, palpitations   Gastrointestinal: Negative for nausea, vomiting, diarrhea, constipation, abdominal pain. Positive for chronic constipation.   Genitourinary: Negative for dysuria, flank pain, frequency, hematuria. See HPI   Musculoskeletal: Negative for decreased ROM, pain, swelling, weakness   Neurological: Negative for dizziness, confusion, headache  Psychiatric: Negative for mood changes   Skin: Negative for itching, rash, ulcer. Positive for eczema rash on bilateral hands. Hematologic/Lymph: Negative for bruising, easy  "bleeding  Allergic/Immunologic: Negative itching, sneezing, swelling      Physical Exam  Vitals reviewed.   Constitutional:       Appearance: Normal appearance.   HENT:      Head: Normocephalic.      Mouth/Throat:      Mouth: Mucous membranes are moist.      Pharynx: Oropharynx is clear.   Eyes:      Pupils: Pupils are equal, round, and reactive to light.   Cardiovascular:      Rate and Rhythm: Normal rate and regular rhythm.      Heart sounds: Normal heart sounds.   Pulmonary:      Effort: Pulmonary effort is normal.      Breath sounds: Normal breath sounds.   Abdominal:      General: Bowel sounds are normal.      Palpations: Abdomen is soft.   Musculoskeletal:         General: Normal range of motion.      Cervical back: Normal range of motion.   Skin:     General: Skin is warm and dry.      Comments: Eczema breakout bilateral hands    Neurological:      General: No focal deficit present.      Mental Status: She is alert and oriented to person, place, and time.   Psychiatric:         Mood and Affect: Mood normal.         Behavior: Behavior normal.          PAT AIRWAY:   Airway:     Mallampati::  II    Neck ROM::  Full  normal        Testing/Diagnostic:     Patient Specialist/PCP:   PCP: Dr. Fuchs     Visit Vitals  /68   Pulse 85   Temp 36.5 °C (97.7 °F) (Temporal)   Resp 18   Ht 1.6 m (5' 3\")   Wt 67.1 kg (148 lb)   SpO2 99%   BMI 26.22 kg/m²   OB Status Postmenopausal   Smoking Status Never   BSA 1.73 m²       DASI Risk Score      Flowsheet Row Pre-Admission Testing from 4/28/2025 in [facility] Questionnaire Series Submission from 3/12/2025 in [facility] with Generic Provider Edouard   Can you take care of yourself (eat, dress, bathe, or use toilet)?  2.75 filed at 04/28/2025 1341 2.75  filed at 03/12/2025 1910   Can you walk indoors, such as around your house? 1.75 filed at 04/28/2025 1341 1.75  filed at 03/12/2025 1910   Can you walk a block or two on level ground?  2.75 filed at " 04/28/2025 1341 2.75  filed at 03/12/2025 1910   Can you climb a flight of stairs or walk up a hill? 5.5 filed at 04/28/2025 1341 5.5  filed at 03/12/2025 1910   Can you run a short distance? 8 filed at 04/28/2025 1341 8  filed at 03/12/2025 1910   Can you do light work around the house like dusting or washing dishes? 2.7 filed at 04/28/2025 1341 2.7  filed at 03/12/2025 1910   Can you do moderate work around the house like vacuuming, sweeping floors or carrying groceries? 3.5 filed at 04/28/2025 1341 3.5  filed at 03/12/2025 1910   Can you do heavy work around the house like scrubbing floors or lifting and moving heavy furniture?  8 filed at 04/28/2025 1341 8  filed at 03/12/2025 1910   Can you do yard work like raking leaves, weeding or pushing a mower? 4.5 filed at 04/28/2025 1341 4.5  filed at 03/12/2025 1910   Can you have sexual relations? 5.25 filed at 04/28/2025 1341 5.25  filed at 03/12/2025 1910   Can you participate in moderate recreational activities like golf, bowling, dancing, doubles tennis or throwing a baseball or football? 6 filed at 04/28/2025 1341 6  filed at 03/12/2025 1910   Can you participate in strenous sports like swimming, singles tennis, football, basketball, or skiing? 7.5 filed at 04/28/2025 1341 7.5  filed at 03/12/2025 1910   DASI SCORE 58.2 filed at 04/28/2025 1341 58.2  filed at 03/12/2025 1910   METS Score (Will be calculated only when all the questions are answered) 9.9 filed at 04/28/2025 1341 9.9  filed at 03/12/2025 1910          Caprini DVT Assessment      Flowsheet Row Pre-Admission Testing from 4/28/2025 in [facility] Pre-Admission Testing from 7/2/2024 in [facility]   DVT Score (IF A SCORE IS NOT CALCULATING, MUST SELECT A BMI TO COMPLETE) 7 filed at 04/28/2025 1340 6 filed at 07/02/2024 0831   Surgical Factors Major surgery planned, lasting over 3 hours filed at 04/28/2025 1340 --   BMI (BMI MUST BE CHOSEN) 30 or less filed at 04/28/2025  1340 30 or less filed at 07/02/2024 0831   RETIRED: Current Status -- Major surgery planned, lasting 2-3 hours filed at 07/02/2024 0831   RETIRED: History -- Prior major surgery filed at 07/02/2024 0831   RETIRED: Age -- 40-59 years filed at 07/02/2024 0831          Modified Frailty Index      Flowsheet Row Pre-Ad[facility]   Non-independent functional status (problems with dressing, bathing, personal grooming, or cooking) 0 filed at 04/28/2025 1341 0 filed at 07/02/2024 0833   History of diabetes mellitus  0 filed at 04/28/2025 1341 0 filed at 07/02/2024 0833   History of COPD 0 filed at 04/28/2025 1341 0 filed at 07/02/2024 0833   History of CHF No filed at 04/28/2025 1341 No filed at 07/02/2024 0833   History of MI 0 filed at 04/28/2025 1341 0 filed at 07/02/2024 0833   History of Percutaneous Coronary Intervention, Cardiac Surgery, or Angina No filed at 04/28/2025 1341 No filed at 07/02/2024 0833   Hypertension requiring the use of medication  0 filed at 04/28/2025 1341 0 filed at 07/02/2024 0833   Peripheral vascular disease 0 filed at 04/28/2025 1341 0 filed at 07/02/2024 0833   Impaired sensorium (cognitive impairement or loss, clouding, or delirium) 0 filed at 04/28/2025 1341 0 filed at 07/02/2024 0833   TIA or CVA withouy residual deficit 0 filed at 04/28/2025 1341 0 filed at 07/02/2024 0833   Cerebrovascular accident with deficit 0 filed at 04/28/2025 1341 0 filed at 07/02/2024 0833   Modified Frailty Index Calculator 0 filed at 04/28/2025 1341 0 filed at 07/02/2024 0833          [id number] Stroke Risk Points  Current as of just now        N/A 0 to 9 Points:      Last Change: N/A          The [id number] risk score (Lip ANDRY, et al. 2009. © 2010 [employer]) quantifies the risk of stroke for a patient with atrial fibrillation. For patients without atrial fibrillation  or under the age of 18 this score appears as N/A. Higher score values generally indicate higher risk of stroke.        This score is not applicable to this patient. Components are not calculated.          Revised Cardiac Risk Index      Flowsheet Row Pre-Admission Testing from 4/28/2025 in [facility] Pre-Admission Testing from 7/2/2024 in [facility]   High-Risk Surgery (Intraperitoneal, Intrathoracic,Suprainguinal vascular) 0 filed at 04/28/2025 1341 0 filed at 07/02/2024 0833   History of ischemic heart disease (History of MI, History of positive exercuse test, Current chest paint considered due to myocardial ischemia, Use of nitrate therapy, ECG with pathological Q Waves) 0 filed at 04/28/2025 1341 0 filed at 07/02/2024 0833   History of congestive heart failure (pulmonary edemia, bilateral rales or S3 gallop, Paroxysmal nocturnal dyspnea, CXR showing pulmonary vascular redistribution) 0 filed at 04/28/2025 1341 0 filed at 07/02/2024 0833   History of cerebrovascular disease (Prior TIA or stroke) 0 filed at 04/28/2025 1341 0 filed at 07/02/2024 0833   Pre-operative insulin treatment 0 filed at 04/28/2025 1341 0 filed at 07/02/2024 0833   Pre-operative creatinine>2 mg/dl 0 filed at 04/28/2025 1341 --   Revised Cardiac Risk Calculator 0 filed at 04/28/2025 1341 --          Apfel Simplified Score      Flowsheet Row Pre-Admission Testing from 4/28/2025 in [facility] Pre-Admission Testing from 7/2/2024 in [facility]   Smoking status 1 filed at 04/28/2025 1341 1 filed at 07/02/2024 0833   History of motion sickness or PONV  0 filed at 04/28/2025 1341 0 filed at 07/02/2024 0833   Use of postoperative opioids 1 filed at 04/28/2025 1341 --   Gender - Female 1=Yes filed at 04/28/2025 1341 --   Apfel Simplified Score Calculator 3 filed at 04/28/2025 1341 --          Risk Analysis Index Results This Encounter         4/28/2025  1341             Do you live in a  place other than your own home?: 0    When did you begin living in the place you are currently residing?: Greater than one year ago    Any kidney failure, kidney not working well, or seeing a kidney doctor (nephrologist)? If yes, was this for kidney stones or another problem?: 1 Kidney stones    Any history of chronic (long-term) congestive heart failure (CHF)?: 0 No    Any shortness of breath when resting?: 0 No    In the past five years, have you been diagnosed with or treated for cancer?: No    During the last 3 months has it become difficult for you to remember things or organize your thoughts?: 0 No    Have you lost weight of 10 pounds or more in the past 3 months without trying?: 0 No    Do you have any loss of appetitie?: 0 No    Getting Around (Mobility): 0 Can get around without help    Eatin Can plan and prepare own meals    Toiletin Can use toilet without any help    Personal Hygiene (Bathing, Hand Washing, Changing Clothes): 0 Can shower or bathe without any help    DIGGS Cancer History: Patient does not indicate history of cancer    Total Risk Analysis Index Score Without Cancer: 17    Total Risk Analysis Index Score: 17          Stop Bang Score      Flowsheet Row Pre-Admission Testing from 2025 in [facility] Questionnaire Series Submission from 3/12/2025 in [facility] with Generic Provider Edouard   Do you snore loudly? 0 filed at 2025 1341 0  filed at 2025 1910   Do you often feel tired or fatigued after your sleep? 0 filed at 2025 1341 0  filed at 2025 1910   Has anyone ever observed you stop breathing in your sleep? 0 filed at 2025 1341 0  filed at 2025 1910   Do you have or are you being treated for high blood pressure? 0 filed at 2025 1341 0  filed at 2025 1910   Recent BMI (Calculated) 26.2 filed at 2025 1341 24.7  filed at 2025 1910   Is BMI greater than 35 kg/m2? 0=No filed at 2025 1341 0=No  filed at  03/12/2025 1910   Age older than 50 years old? 1=Yes filed at 04/28/2025 1341 1=Yes  filed at 03/12/2025 1910   Is your neck circumference greater than 17 inches (Male) or 16 inches (Female)? 0 filed at 04/28/2025 1341 --   Gender - Male 0=No filed at 04/28/2025 1341 0=No  filed at 03/12/2025 1910   STOP-BANG Total Score 1 filed at 04/28/2025 1341 --          Prodigy: High Risk  Total Score: 3              Prodigy Previous Opioid Use Score           ARISCAT Score for Postoperative Pulmonary Complications      Flowsheet Row Pre-Admission Testing from 4/28/2025 in [facility]   Age Calculated Score 3 filed at 04/28/2025 1341   Preoperative SpO2 0 filed at 04/28/2025 1341   Respiratory infection in the last month Either upper or lower (i.e., URI, bronchitis, pneumonia), with fever and antibiotic treatment 0 filed at 04/28/2025 1341   Preoperative anemia (Hgb less than 10 g/dl) 0 filed at 04/28/2025 1341   Surgical incision  0 filed at 04/28/2025 1341   Duration of surgery  23 filed at 04/28/2025 1341   Emergency Procedure  0 filed at 04/28/2025 1341   ARISCAT Total Score  26 filed at 04/28/2025 1341          Stone Perioperative Risk for Myocardial Infarction or Cardiac Arrest (MOUNA)      Flowsheet Row Pre-Admission Testing from 4/28/2025 in [facility]   Calculated Age Score 1.18 filed at 04/28/2025 1342   Functional Status  0 filed at 04/28/2025 1342   ASA Class  -3.29 filed at 04/28/2025 1342   Creatinine 0 filed at 04/28/2025 1342   Type of Procedure  1.13 filed at 04/28/2025 1342   MOUNA Total Score  -6.23 filed at 04/28/2025 1342   MOUNA % 0.2 filed at 04/28/2025 1342            Assessment and Plan:     Assessment and Plan:     Preop:   OR with Dr. Mccray and Dr. Allison on 5/12/25   Labs ordered per Dr. Mccray.   EKG on file from 2/3/25.      Neurologic:   The patient is at an increased risk for post operative delirium secondary to polypharmacy  and type and duration of surgery .  Preoperative brain exercise educational handout provided to patient.  The patient is at an increased risk for perioperative stroke secondary to female sex  and general anesthesia.    Cardiac:  Hypertension: States prior to weight loss. No medications.   Hyperlipidemia: States prior to weight loss   Duke Activity Status Index (DASI)  DASI Score: 58.2   MET Score: 9.9  RCRI  0 which is 3.9% 30 day risk of MACE (risk for cardiac death, nonfatal myocardial infarction, and nonfactal cardiac arrest)  MOUNA score which indicates a   0.2% risk of intraoperative or 30-day postoperative MACE    Pulmonary:   Asthma: Stable with inhalers. Rarely uses rescue inhaler   STOP-BANG score of  1.  Low risk of obstructive sleep apnea.   ARISCAT:   26   points which is a intermediate (13.3%) risk of in-hospital post-op pulmonary complications     GI:  GERD: S/P Neisen fundoplication. States has been pretty stable since this surgery.   Apfel: 3 points 61% risk for post operative N/V    /Renal:   Bladder prolapse: Reason for upcoming procedure.   Nephrolithiasis: No acute concerns. H/O     Neuro-muscular:     Psychiatric:   Anxiety/depression: Stable. No thoughts of harming self/others     Hematologic:   Caprini score 7, patient at high risk for perioperative DVT. Patient provided with VTE education/handout.     Skin check:  Eczema: Bilateral hands. No open areas-recently on oral prednisone for exacerbation.   Patient was instructed to make surgeon aware of any skin changes/concerns prior to surgery.     Anesthesia: No history of anesthesia complications. No anesthesia concerns.      *See risk scores as previously documented   ASA II per anesthesia in Feb. 2025. No acute changes in medical history        [1]   Past Medical History:  Diagnosis Date    Asthma     Chronic pruritic rash in adult     B/L hands    Depression     Endometriosis     Female cystocele     GERD (gastroesophageal reflux disease)     Nephrolithiasis     Personal  history of other diseases of the digestive system 11/15/2016    History of intestinal obstruction    Personal history of other diseases of the female genital tract 11/15/2016    History of endometriosis    Personal history of urinary calculi 11/15/2016    History of renal calculi    Rectocele     Quevedo- syndrome (Multi)     Thyroid nodule     Unspecified ectopic pregnancy without intrauterine pregnancy ([facility]) 11/15/2016    Ectopic pregnancy   [2]   Past Surgical History:  Procedure Laterality Date    BLADDER SURGERY      ECTOPIC PREGNANCY SURGERY      HERNIA REPAIR  04/23/2018    Hernia Repair    HYSTERECTOMY  11/15/2016    Hysterectomy    KNEE SURGERY  11/15/2016    Knee Surgery    OTHER SURGICAL HISTORY  07/24/2018    Esophagogastric Fundoplasty Nissen Fundoplication Robotic-Assisted   [3]   Family History  Problem Relation Name Age of Onset    Breast cancer Mother Ami Go 64    Heart failure Mother Ami Go     Hypertension Mother Ami Go     Cancer Mother Amisimon Go     Heart failure Father Mckinley Go     Hypertension Father Mckinley Go     Breast cancer Sister Ana Beal 64    Diabetes Sister Ana Emigdio     Hypertension Sister Ana Emigdio     Heart failure Brother Jay Go     Hypertension Brother Jayairam Go     Seizures Child      Breast cancer Mother's Sister Sister 62    Breast cancer Niece  44    Cancer Paternal Grandfather Mckinley Go     Cancer Mother's Sister Ivania Lidia     Cancer Mother's Sister Ivania LaGrange     Cancer Mother's Sister Ivania LaGrange     Cancer Mother's Sister Ivania LaGrange     Cancer Mother's Sister Ivania Lidia    [4]   Allergies  Allergen Reactions    Cephalexin Anaphylaxis and Quevedo- syndrome    Codeine Diarrhea, Unknown, GI Upset and Nausea/vomiting    Erythromycin Quevedo- syndrome    Penicillins Anaphylaxis and Quevedo- syndrome    Tetracyclines Anaphylaxis, Hives, Shortness of breath  and Nausea/vomiting    Bupropion Itching

## 2025-04-28 NOTE — PREPROCEDURE INSTRUCTIONS
Thank you for visiting Preadmission Testing at [facility]. If you have any changes to your health condition, please call the SURGEON's office to alert them and give them details of your symptoms.  STOP all NSAIDS (Ibuprofen, Motrin, Aleve), vitamins, herbals, supplements, and all over the counter medications for 7 days prior to surgery  Tylenol is okay to take up until the morning of surgery for pain or headache if needed         Preoperative Brain Exercises    What are brain exercises?  A brain exercise is any activity that engages your thinking (cognitive) skills.    What types of activities are considered brain exercises?  Jigsaw puzzles, crossword puzzles, word jumble, memory games, word search, and many more.  Many can be found free online or on your phone via a mobile michael.    Why should I do brain exercises before my surgery?  More recent research has shown brain exercise before surgery can lower the risk of postoperative delirium (confusion) which can be especially important for older adults.  Patients who did brain exercises for 5 to 10 hours the days before surgery, cut their risk of postoperative delirium in half up to 1 week after surgery.      Preoperative Deep Breathing Exercises    Why it is important to do deep breathing exercises before my surgery?  Deep breathing exercises strengthen your breathing muscles.  This helps you to recover after your surgery and decreases the chance of breathing complications.    How are the deep breathing exercises done?  Sit straight with your back supported.  Breathe in deeply and slowly through your nose. Your lower rib cage should expand and your abdomen may move forward.  Hold that breath for 3 to 5 seconds.  Breathe out through pursed lips, slowly and completely.  Rest and repeat 10 times every hour while awake.  Rest longer if you become dizzy or lightheaded.      Patient and Family Education   Ways You Can Help Prevent Blood Clots     This handout explains some simple  things you can do to help prevent blood clots.      Blood clots are blockages that can form in the body's veins. When a blood clot forms in your deep veins, it may be called a deep vein thrombosis, or DVT for short. Blood clots can happen in any part of the body where blood flows, but they are most common in the arms and legs. If a piece of a blood clot breaks free and travels to the lungs, it is called a pulmonary embolus (PE). A PE can be a very serious problem.      Being in the hospital or having surgery can raise your chances of getting a blood clot because you may not be well enough to move around as much as you normally do.      Ways you can help prevent blood clots in the hospital         Wearing SCDs. SCDs stands for Sequential Compression Devices.   SCDs are special sleeves that wrap around your legs  They attach to a pump that fills them with air to gently squeeze your legs every few minutes.   This helps return the blood in your legs to your heart.   SCDs should only be taken off when walking or bathing.   SCDs may not be comfortable, but they can help save your life.               Wearing compression stockings - if your doctor orders them. These special snug fitting stockings gently squeeze your legs to help blood flow.       Walking. Walking helps move the blood in your legs.   If your doctor says it is ok, try walking the halls at least   5 times a day. Ask us to help you get up, so you don't fall.      Taking any blood thinning medicines your doctor orders.          ©[facility]; 3/23        Ways you can help prevent blood clots at home       Wearing compression stockings - if your doctor orders them. ? Walking - to help move the blood in your legs.       Taking any blood thinning medicines your doctor orders.      Signs of a blood clot or PE      Tell your doctor or nurse know right away if you have of the problems listed below.    If you are at home, seek medical care right away. Call [phone]  for chest pain or problems breathing.          Signs of a blood clot (DVT) - such as pain,  swelling, redness or warmth in your arm or leg      Signs of a pulmonary embolism (PE) - such as chest     pain or feeling short of breath

## 2025-04-29 LAB — HOLD SPECIMEN: NORMAL

## 2025-04-30 LAB — STAPHYLOCOCCUS SPEC CULT: NORMAL

## 2025-05-05 ENCOUNTER — TELEPHONE (OUTPATIENT)
Dept: UROLOGY | Facility: HOSPITAL | Age: 60
End: 2025-05-05
Payer: COMMERCIAL

## 2025-05-05 NOTE — TELEPHONE ENCOUNTER
"5/5/2025  5:16 PM    Called patient to discuss research study titled \"Mood Changes After Pelvic Organ Prolapse Surgery\". Patient not available. Message left to email [email or web address] with a convenient time frame for a return phone call.    Jessi España MD, SHE, BSN  URPS Fellow, PGY-5  "

## 2025-05-09 NOTE — TELEPHONE ENCOUNTER
"5/9/2025  10:58 AM    Called patient to discuss research study titled \"Mood Changes After Pelvic Organ Prolapse Surgery\". Patient not available. Message left to email [email or web address] with a convenient time frame for a return phone call.    Jessi España MD, SHE, BSN  URPS Fellow, PGY-5  "

## 2025-05-12 ENCOUNTER — ANESTHESIA EVENT (OUTPATIENT)
Dept: OPERATING ROOM | Facility: HOSPITAL | Age: 60
End: 2025-05-12
Payer: COMMERCIAL

## 2025-05-12 ENCOUNTER — HOSPITAL ENCOUNTER (OUTPATIENT)
Facility: HOSPITAL | Age: 60
Discharge: HOME | End: 2025-05-13
Attending: UROLOGY | Admitting: SURGERY
Payer: COMMERCIAL

## 2025-05-12 ENCOUNTER — ANESTHESIA (OUTPATIENT)
Dept: OPERATING ROOM | Facility: HOSPITAL | Age: 60
End: 2025-05-12
Payer: COMMERCIAL

## 2025-05-12 DIAGNOSIS — N81.4 CYSTOCELE WITH PROLAPSE: Primary | ICD-10-CM

## 2025-05-12 PROBLEM — L98.7 EXCESS SKIN: Status: ACTIVE | Noted: 2025-05-12

## 2025-05-12 LAB
ABO GROUP (TYPE) IN BLOOD: NORMAL
ANTIBODY SCREEN: NORMAL
RH FACTOR (ANTIGEN D): NORMAL

## 2025-05-12 PROCEDURE — C1781 MESH (IMPLANTABLE): HCPCS | Performed by: UROLOGY

## 2025-05-12 PROCEDURE — 96372 THER/PROPH/DIAG INJ SC/IM: CPT | Mod: 59 | Performed by: PHYSICIAN ASSISTANT

## 2025-05-12 PROCEDURE — 2780000003 HC OR 278 NO HCPCS: Performed by: UROLOGY

## 2025-05-12 PROCEDURE — RXMED WILLOW AMBULATORY MEDICATION CHARGE

## 2025-05-12 PROCEDURE — 2500000004 HC RX 250 GENERAL PHARMACY W/ HCPCS (ALT 636 FOR OP/ED): Performed by: UROLOGY

## 2025-05-12 PROCEDURE — A49613 PR RPR AA HERNIA RECR < 3 CM REDUCIBLE: Performed by: ANESTHESIOLOGIST ASSISTANT

## 2025-05-12 PROCEDURE — 2500000005 HC RX 250 GENERAL PHARMACY W/O HCPCS: Performed by: ANESTHESIOLOGY

## 2025-05-12 PROCEDURE — 3700000002 HC GENERAL ANESTHESIA TIME - EACH INCREMENTAL 1 MINUTE: Performed by: UROLOGY

## 2025-05-12 PROCEDURE — 3600000003 HC OR TIME - INITIAL BASE CHARGE - PROCEDURE LEVEL THREE: Performed by: UROLOGY

## 2025-05-12 PROCEDURE — C2631 REP DEV, URINARY, W/O SLING: HCPCS | Performed by: UROLOGY

## 2025-05-12 PROCEDURE — 2500000004 HC RX 250 GENERAL PHARMACY W/ HCPCS (ALT 636 FOR OP/ED): Mod: JZ | Performed by: PHYSICIAN ASSISTANT

## 2025-05-12 PROCEDURE — 7100000002 HC RECOVERY ROOM TIME - EACH INCREMENTAL 1 MINUTE: Performed by: UROLOGY

## 2025-05-12 PROCEDURE — 3600000008 HC OR TIME - EACH INCREMENTAL 1 MINUTE - PROCEDURE LEVEL THREE: Performed by: UROLOGY

## 2025-05-12 PROCEDURE — 7100000011 HC EXTENDED STAY RECOVERY HOURLY - NURSING UNIT

## 2025-05-12 PROCEDURE — 2500000005 HC RX 250 GENERAL PHARMACY W/O HCPCS: Performed by: ANESTHESIOLOGIST ASSISTANT

## 2025-05-12 PROCEDURE — 2500000004 HC RX 250 GENERAL PHARMACY W/ HCPCS (ALT 636 FOR OP/ED): Mod: JZ | Performed by: ANESTHESIOLOGY

## 2025-05-12 PROCEDURE — 57282 COLPOPEXY EXTRAPERITONEAL: CPT | Performed by: UROLOGY

## 2025-05-12 PROCEDURE — 57250 REPAIR RECTUM & VAGINA: CPT | Performed by: UROLOGY

## 2025-05-12 PROCEDURE — 2500000004 HC RX 250 GENERAL PHARMACY W/ HCPCS (ALT 636 FOR OP/ED): Performed by: ANESTHESIOLOGIST ASSISTANT

## 2025-05-12 PROCEDURE — 36415 COLL VENOUS BLD VENIPUNCTURE: CPT | Performed by: UROLOGY

## 2025-05-12 PROCEDURE — 2720000007 HC OR 272 NO HCPCS: Performed by: UROLOGY

## 2025-05-12 PROCEDURE — 2500000001 HC RX 250 WO HCPCS SELF ADMINISTERED DRUGS (ALT 637 FOR MEDICARE OP): Performed by: PHYSICIAN ASSISTANT

## 2025-05-12 PROCEDURE — A49613 PR RPR AA HERNIA RECR < 3 CM REDUCIBLE: Performed by: ANESTHESIOLOGY

## 2025-05-12 PROCEDURE — 3700000001 HC GENERAL ANESTHESIA TIME - INITIAL BASE CHARGE: Performed by: UROLOGY

## 2025-05-12 PROCEDURE — 7100000001 HC RECOVERY ROOM TIME - INITIAL BASE CHARGE: Performed by: UROLOGY

## 2025-05-12 PROCEDURE — 86850 RBC ANTIBODY SCREEN: CPT | Performed by: UROLOGY

## 2025-05-12 DEVICE — MESH, TIGR MATRIX, 20X30, RESORABLE: Type: IMPLANTABLE DEVICE | Site: ABDOMEN | Status: FUNCTIONAL

## 2025-05-12 RX ORDER — SODIUM CHLORIDE, SODIUM LACTATE, POTASSIUM CHLORIDE, CALCIUM CHLORIDE 600; 310; 30; 20 MG/100ML; MG/100ML; MG/100ML; MG/100ML
75 INJECTION, SOLUTION INTRAVENOUS CONTINUOUS
Status: ACTIVE | OUTPATIENT
Start: 2025-05-12 | End: 2025-05-13

## 2025-05-12 RX ORDER — DOCUSATE SODIUM 100 MG/1
100 CAPSULE, LIQUID FILLED ORAL 2 TIMES DAILY
Status: DISCONTINUED | OUTPATIENT
Start: 2025-05-12 | End: 2025-05-13 | Stop reason: HOSPADM

## 2025-05-12 RX ORDER — HYDROMORPHONE HYDROCHLORIDE 1 MG/ML
1 INJECTION, SOLUTION INTRAMUSCULAR; INTRAVENOUS; SUBCUTANEOUS EVERY 5 MIN PRN
Status: DISCONTINUED | OUTPATIENT
Start: 2025-05-12 | End: 2025-05-12 | Stop reason: HOSPADM

## 2025-05-12 RX ORDER — ACETAMINOPHEN 325 MG/1
975 TABLET ORAL ONCE
Status: DISCONTINUED | OUTPATIENT
Start: 2025-05-12 | End: 2025-05-12 | Stop reason: HOSPADM

## 2025-05-12 RX ORDER — PROPOFOL 10 MG/ML
INJECTION, EMULSION INTRAVENOUS AS NEEDED
Status: DISCONTINUED | OUTPATIENT
Start: 2025-05-12 | End: 2025-05-12

## 2025-05-12 RX ORDER — ROCURONIUM BROMIDE 10 MG/ML
INJECTION, SOLUTION INTRAVENOUS AS NEEDED
Status: DISCONTINUED | OUTPATIENT
Start: 2025-05-12 | End: 2025-05-12

## 2025-05-12 RX ORDER — MONTELUKAST SODIUM 10 MG/1
10 TABLET ORAL EVERY MORNING
Status: DISCONTINUED | OUTPATIENT
Start: 2025-05-13 | End: 2025-05-13 | Stop reason: HOSPADM

## 2025-05-12 RX ORDER — HYDROMORPHONE HYDROCHLORIDE 1 MG/ML
INJECTION, SOLUTION INTRAMUSCULAR; INTRAVENOUS; SUBCUTANEOUS AS NEEDED
Status: DISCONTINUED | OUTPATIENT
Start: 2025-05-12 | End: 2025-05-12

## 2025-05-12 RX ORDER — OXYCODONE HYDROCHLORIDE 5 MG/1
5 TABLET ORAL EVERY 4 HOURS PRN
Status: DISCONTINUED | OUTPATIENT
Start: 2025-05-12 | End: 2025-05-12 | Stop reason: HOSPADM

## 2025-05-12 RX ORDER — CLINDAMYCIN HYDROCHLORIDE 300 MG/1
300 CAPSULE ORAL 3 TIMES DAILY
Qty: 30 CAPSULE | Refills: 0 | Status: SHIPPED | OUTPATIENT
Start: 2025-05-12 | End: 2025-05-23

## 2025-05-12 RX ORDER — ALBUTEROL SULFATE 0.83 MG/ML
2.5 SOLUTION RESPIRATORY (INHALATION) ONCE AS NEEDED
Status: DISCONTINUED | OUTPATIENT
Start: 2025-05-12 | End: 2025-05-12 | Stop reason: HOSPADM

## 2025-05-12 RX ORDER — BUPIVACAINE HYDROCHLORIDE 5 MG/ML
INJECTION, SOLUTION EPIDURAL; INTRACAUDAL; PERINEURAL AS NEEDED
Status: DISCONTINUED | OUTPATIENT
Start: 2025-05-12 | End: 2025-05-12 | Stop reason: HOSPADM

## 2025-05-12 RX ORDER — ONDANSETRON HYDROCHLORIDE 2 MG/ML
INJECTION, SOLUTION INTRAVENOUS AS NEEDED
Status: DISCONTINUED | OUTPATIENT
Start: 2025-05-12 | End: 2025-05-12

## 2025-05-12 RX ORDER — HYDROCODONE BITARTRATE AND ACETAMINOPHEN 5; 325 MG/1; MG/1
1 TABLET ORAL EVERY 6 HOURS PRN
Qty: 20 TABLET | Refills: 0 | Status: SHIPPED | OUTPATIENT
Start: 2025-05-12

## 2025-05-12 RX ORDER — ONDANSETRON HYDROCHLORIDE 2 MG/ML
4 INJECTION, SOLUTION INTRAVENOUS EVERY 6 HOURS PRN
Status: DISCONTINUED | OUTPATIENT
Start: 2025-05-12 | End: 2025-05-13 | Stop reason: HOSPADM

## 2025-05-12 RX ORDER — MEPERIDINE HYDROCHLORIDE 50 MG/ML
12.5 INJECTION INTRAMUSCULAR; INTRAVENOUS; SUBCUTANEOUS EVERY 10 MIN PRN
Status: DISCONTINUED | OUTPATIENT
Start: 2025-05-12 | End: 2025-05-12 | Stop reason: HOSPADM

## 2025-05-12 RX ORDER — OXYCODONE HYDROCHLORIDE 5 MG/1
5 TABLET ORAL EVERY 4 HOURS PRN
Status: DISCONTINUED | OUTPATIENT
Start: 2025-05-12 | End: 2025-05-13 | Stop reason: HOSPADM

## 2025-05-12 RX ORDER — CLINDAMYCIN PHOSPHATE 900 MG/50ML
900 INJECTION, SOLUTION INTRAVENOUS EVERY 8 HOURS
Status: DISCONTINUED | OUTPATIENT
Start: 2025-05-12 | End: 2025-05-12 | Stop reason: HOSPADM

## 2025-05-12 RX ORDER — MIDAZOLAM HYDROCHLORIDE 1 MG/ML
INJECTION, SOLUTION INTRAMUSCULAR; INTRAVENOUS AS NEEDED
Status: DISCONTINUED | OUTPATIENT
Start: 2025-05-12 | End: 2025-05-12

## 2025-05-12 RX ORDER — MIDAZOLAM HYDROCHLORIDE 1 MG/ML
1 INJECTION, SOLUTION INTRAMUSCULAR; INTRAVENOUS ONCE AS NEEDED
Status: DISCONTINUED | OUTPATIENT
Start: 2025-05-12 | End: 2025-05-12 | Stop reason: HOSPADM

## 2025-05-12 RX ORDER — ONDANSETRON 4 MG/1
4 TABLET, ORALLY DISINTEGRATING ORAL EVERY 6 HOURS PRN
Status: DISCONTINUED | OUTPATIENT
Start: 2025-05-12 | End: 2025-05-13 | Stop reason: HOSPADM

## 2025-05-12 RX ORDER — GLYCOPYRROLATE 0.2 MG/ML
INJECTION INTRAMUSCULAR; INTRAVENOUS AS NEEDED
Status: DISCONTINUED | OUTPATIENT
Start: 2025-05-12 | End: 2025-05-12

## 2025-05-12 RX ORDER — LIDOCAINE HYDROCHLORIDE 20 MG/ML
INJECTION, SOLUTION INFILTRATION; PERINEURAL AS NEEDED
Status: DISCONTINUED | OUTPATIENT
Start: 2025-05-12 | End: 2025-05-12

## 2025-05-12 RX ORDER — DOCUSATE SODIUM 100 MG/1
100 CAPSULE, LIQUID FILLED ORAL 2 TIMES DAILY
Qty: 20 CAPSULE | Refills: 0 | Status: SHIPPED | OUTPATIENT
Start: 2025-05-12

## 2025-05-12 RX ORDER — LIDOCAINE HYDROCHLORIDE 10 MG/ML
0.1 INJECTION, SOLUTION INFILTRATION; PERINEURAL ONCE
Status: DISCONTINUED | OUTPATIENT
Start: 2025-05-12 | End: 2025-05-12 | Stop reason: HOSPADM

## 2025-05-12 RX ORDER — FENTANYL CITRATE 50 UG/ML
12.5 INJECTION, SOLUTION INTRAMUSCULAR; INTRAVENOUS EVERY 5 MIN PRN
Status: DISCONTINUED | OUTPATIENT
Start: 2025-05-12 | End: 2025-05-12 | Stop reason: HOSPADM

## 2025-05-12 RX ORDER — SODIUM CHLORIDE, SODIUM LACTATE, POTASSIUM CHLORIDE, CALCIUM CHLORIDE 600; 310; 30; 20 MG/100ML; MG/100ML; MG/100ML; MG/100ML
100 INJECTION, SOLUTION INTRAVENOUS CONTINUOUS
Status: DISCONTINUED | OUTPATIENT
Start: 2025-05-12 | End: 2025-05-12 | Stop reason: HOSPADM

## 2025-05-12 RX ORDER — ROCURONIUM BROMIDE 50 MG/5 ML
SYRINGE (ML) INTRAVENOUS AS NEEDED
Status: DISCONTINUED | OUTPATIENT
Start: 2025-05-12 | End: 2025-05-12

## 2025-05-12 RX ORDER — FENTANYL CITRATE 50 UG/ML
INJECTION, SOLUTION INTRAMUSCULAR; INTRAVENOUS AS NEEDED
Status: DISCONTINUED | OUTPATIENT
Start: 2025-05-12 | End: 2025-05-12

## 2025-05-12 RX ORDER — NALOXONE HYDROCHLORIDE 0.4 MG/ML
0.1 INJECTION, SOLUTION INTRAMUSCULAR; INTRAVENOUS; SUBCUTANEOUS EVERY 5 MIN PRN
Status: DISCONTINUED | OUTPATIENT
Start: 2025-05-12 | End: 2025-05-13 | Stop reason: HOSPADM

## 2025-05-12 RX ORDER — HYDRALAZINE HYDROCHLORIDE 20 MG/ML
5 INJECTION INTRAMUSCULAR; INTRAVENOUS EVERY 30 MIN PRN
Status: DISCONTINUED | OUTPATIENT
Start: 2025-05-12 | End: 2025-05-12 | Stop reason: HOSPADM

## 2025-05-12 RX ORDER — DIPHENHYDRAMINE HYDROCHLORIDE 50 MG/ML
12.5 INJECTION, SOLUTION INTRAMUSCULAR; INTRAVENOUS ONCE AS NEEDED
Status: DISCONTINUED | OUTPATIENT
Start: 2025-05-12 | End: 2025-05-12 | Stop reason: HOSPADM

## 2025-05-12 RX ORDER — ACETAMINOPHEN 325 MG/1
650 TABLET ORAL EVERY 6 HOURS
Status: DISCONTINUED | OUTPATIENT
Start: 2025-05-12 | End: 2025-05-13 | Stop reason: HOSPADM

## 2025-05-12 RX ORDER — HEPARIN SODIUM 5000 [USP'U]/ML
5000 INJECTION, SOLUTION INTRAVENOUS; SUBCUTANEOUS EVERY 12 HOURS
Status: DISCONTINUED | OUTPATIENT
Start: 2025-05-12 | End: 2025-05-13 | Stop reason: HOSPADM

## 2025-05-12 RX ORDER — CLINDAMYCIN PHOSPHATE 900 MG/50ML
INJECTION, SOLUTION INTRAVENOUS AS NEEDED
Status: DISCONTINUED | OUTPATIENT
Start: 2025-05-12 | End: 2025-05-12

## 2025-05-12 RX ORDER — HYDROMORPHONE HYDROCHLORIDE 0.2 MG/ML
0.2 INJECTION INTRAMUSCULAR; INTRAVENOUS; SUBCUTANEOUS EVERY 4 HOURS PRN
Status: COMPLETED | OUTPATIENT
Start: 2025-05-12 | End: 2025-05-13

## 2025-05-12 RX ORDER — CYCLOBENZAPRINE HCL 5 MG
5 TABLET ORAL 3 TIMES DAILY PRN
Qty: 30 TABLET | Refills: 2 | Status: SHIPPED | OUTPATIENT
Start: 2025-05-12

## 2025-05-12 RX ORDER — ONDANSETRON HYDROCHLORIDE 2 MG/ML
4 INJECTION, SOLUTION INTRAVENOUS ONCE AS NEEDED
Status: COMPLETED | OUTPATIENT
Start: 2025-05-12 | End: 2025-05-12

## 2025-05-12 RX ADMIN — Medication 20 MG: at 11:37

## 2025-05-12 RX ADMIN — HYDROMORPHONE HYDROCHLORIDE 0.5 MG: 1 INJECTION, SOLUTION INTRAMUSCULAR; INTRAVENOUS; SUBCUTANEOUS at 13:35

## 2025-05-12 RX ADMIN — LIDOCAINE HYDROCHLORIDE 100 MG: 20 INJECTION, SOLUTION INFILTRATION; PERINEURAL at 07:52

## 2025-05-12 RX ADMIN — Medication 50 MG: at 07:53

## 2025-05-12 RX ADMIN — HYDROMORPHONE HYDROCHLORIDE 0.25 MG: 1 INJECTION, SOLUTION INTRAMUSCULAR; INTRAVENOUS; SUBCUTANEOUS at 12:19

## 2025-05-12 RX ADMIN — HYDROMORPHONE HYDROCHLORIDE 0.5 MG: 1 INJECTION, SOLUTION INTRAMUSCULAR; INTRAVENOUS; SUBCUTANEOUS at 12:49

## 2025-05-12 RX ADMIN — SODIUM CHLORIDE, SODIUM LACTATE, POTASSIUM CHLORIDE, AND CALCIUM CHLORIDE 75 ML/HR: .6; .31; .03; .02 INJECTION, SOLUTION INTRAVENOUS at 16:44

## 2025-05-12 RX ADMIN — DEXAMETHASONE SODIUM PHOSPHATE 8 MG: 4 INJECTION INTRA-ARTICULAR; INTRALESIONAL; INTRAMUSCULAR; INTRAVENOUS; SOFT TISSUE at 07:56

## 2025-05-12 RX ADMIN — DOCUSATE SODIUM 100 MG: 100 CAPSULE, LIQUID FILLED ORAL at 20:11

## 2025-05-12 RX ADMIN — SODIUM CHLORIDE, POTASSIUM CHLORIDE, SODIUM LACTATE AND CALCIUM CHLORIDE: 600; 310; 30; 20 INJECTION, SOLUTION INTRAVENOUS at 12:19

## 2025-05-12 RX ADMIN — Medication 20 MG: at 10:53

## 2025-05-12 RX ADMIN — ACETAMINOPHEN 650 MG: 325 TABLET ORAL at 17:34

## 2025-05-12 RX ADMIN — FENTANYL CITRATE 50 MCG: 50 INJECTION, SOLUTION INTRAMUSCULAR; INTRAVENOUS at 07:47

## 2025-05-12 RX ADMIN — HYDROMORPHONE HYDROCHLORIDE 0.5 MG: 1 INJECTION, SOLUTION INTRAMUSCULAR; INTRAVENOUS; SUBCUTANEOUS at 10:36

## 2025-05-12 RX ADMIN — SUGAMMADEX 200 MG: 100 INJECTION, SOLUTION INTRAVENOUS at 13:02

## 2025-05-12 RX ADMIN — Medication 10 MG: at 10:36

## 2025-05-12 RX ADMIN — HYDROMORPHONE HYDROCHLORIDE 0.5 MG: 1 INJECTION, SOLUTION INTRAMUSCULAR; INTRAVENOUS; SUBCUTANEOUS at 14:42

## 2025-05-12 RX ADMIN — Medication 20 MG: at 10:11

## 2025-05-12 RX ADMIN — SODIUM CHLORIDE, SODIUM LACTATE, POTASSIUM CHLORIDE, AND CALCIUM CHLORIDE 75 ML/HR: .6; .31; .03; .02 INJECTION, SOLUTION INTRAVENOUS at 20:19

## 2025-05-12 RX ADMIN — GLYCOPYRROLATE 0.1 MG: 0.2 INJECTION, SOLUTION INTRAMUSCULAR; INTRAVENOUS at 07:47

## 2025-05-12 RX ADMIN — HYDROMORPHONE HYDROCHLORIDE 0.5 MG: 1 INJECTION, SOLUTION INTRAMUSCULAR; INTRAVENOUS; SUBCUTANEOUS at 08:46

## 2025-05-12 RX ADMIN — Medication 20 MG: at 08:13

## 2025-05-12 RX ADMIN — Medication 20 MG: at 09:06

## 2025-05-12 RX ADMIN — CLINDAMYCIN PHOSPHATE 900 MG: 900 INJECTION, SOLUTION INTRAVENOUS at 07:57

## 2025-05-12 RX ADMIN — ONDANSETRON 4 MG: 2 INJECTION INTRAMUSCULAR; INTRAVENOUS at 17:21

## 2025-05-12 RX ADMIN — ONDANSETRON 4 MG: 2 INJECTION INTRAMUSCULAR; INTRAVENOUS at 13:52

## 2025-05-12 RX ADMIN — HYDROMORPHONE HYDROCHLORIDE 0.5 MG: 1 INJECTION, SOLUTION INTRAMUSCULAR; INTRAVENOUS; SUBCUTANEOUS at 13:49

## 2025-05-12 RX ADMIN — HYDROMORPHONE HYDROCHLORIDE 0.5 MG: 1 INJECTION, SOLUTION INTRAMUSCULAR; INTRAVENOUS; SUBCUTANEOUS at 14:20

## 2025-05-12 RX ADMIN — FENTANYL CITRATE 50 MCG: 50 INJECTION, SOLUTION INTRAMUSCULAR; INTRAVENOUS at 08:12

## 2025-05-12 RX ADMIN — HYDROMORPHONE HYDROCHLORIDE 0.5 MG: 1 INJECTION, SOLUTION INTRAMUSCULAR; INTRAVENOUS; SUBCUTANEOUS at 13:16

## 2025-05-12 RX ADMIN — MIDAZOLAM 1 MG: 1 INJECTION INTRAMUSCULAR; INTRAVENOUS at 07:47

## 2025-05-12 RX ADMIN — HYDROMORPHONE HYDROCHLORIDE 0.25 MG: 1 INJECTION, SOLUTION INTRAMUSCULAR; INTRAVENOUS; SUBCUTANEOUS at 11:29

## 2025-05-12 RX ADMIN — ACETAMINOPHEN 650 MG: 325 TABLET ORAL at 22:02

## 2025-05-12 RX ADMIN — SODIUM CHLORIDE, POTASSIUM CHLORIDE, SODIUM LACTATE AND CALCIUM CHLORIDE: 600; 310; 30; 20 INJECTION, SOLUTION INTRAVENOUS at 07:47

## 2025-05-12 RX ADMIN — ONDANSETRON 4 MG: 2 INJECTION, SOLUTION INTRAMUSCULAR; INTRAVENOUS at 07:47

## 2025-05-12 RX ADMIN — ONDANSETRON 4 MG: 2 INJECTION, SOLUTION INTRAMUSCULAR; INTRAVENOUS at 12:49

## 2025-05-12 RX ADMIN — HYDROMORPHONE HYDROCHLORIDE 0.2 MG: 0.2 INJECTION, SOLUTION INTRAMUSCULAR; INTRAVENOUS; SUBCUTANEOUS at 20:11

## 2025-05-12 RX ADMIN — PROPOFOL 200 MG: 10 INJECTION, EMULSION INTRAVENOUS at 07:53

## 2025-05-12 RX ADMIN — Medication 6 L/MIN: at 13:25

## 2025-05-12 RX ADMIN — HEPARIN SODIUM 5000 UNITS: 5000 INJECTION, SOLUTION INTRAVENOUS; SUBCUTANEOUS at 17:21

## 2025-05-12 SDOH — SOCIAL STABILITY: SOCIAL INSECURITY: WERE YOU ABLE TO COMPLETE ALL THE BEHAVIORAL HEALTH SCREENINGS?: YES

## 2025-05-12 SDOH — SOCIAL STABILITY: SOCIAL INSECURITY
WITHIN THE LAST YEAR, HAVE YOU BEEN KICKED, HIT, SLAPPED, OR OTHERWISE PHYSICALLY HURT BY YOUR PARTNER OR EX-PARTNER?: NO

## 2025-05-12 SDOH — SOCIAL STABILITY: SOCIAL INSECURITY: DOES ANYONE TRY TO KEEP YOU FROM HAVING/CONTACTING OTHER FRIENDS OR DOING THINGS OUTSIDE YOUR HOME?: NO

## 2025-05-12 SDOH — SOCIAL STABILITY: SOCIAL INSECURITY: HAVE YOU HAD ANY THOUGHTS OF HARMING ANYONE ELSE?: NO

## 2025-05-12 SDOH — SOCIAL STABILITY: SOCIAL INSECURITY
WITHIN THE LAST YEAR, HAVE YOU BEEN RAPED OR FORCED TO HAVE ANY KIND OF SEXUAL ACTIVITY BY YOUR PARTNER OR EX-PARTNER?: NO

## 2025-05-12 SDOH — SOCIAL STABILITY: SOCIAL INSECURITY: ARE YOU OR HAVE YOU BEEN THREATENED OR ABUSED PHYSICALLY, EMOTIONALLY, OR SEXUALLY BY ANYONE?: NO

## 2025-05-12 SDOH — HEALTH STABILITY: MENTAL HEALTH: HOW OFTEN DO YOU HAVE SIX OR MORE DRINKS ON ONE OCCASION?: NEVER

## 2025-05-12 SDOH — ECONOMIC STABILITY: INCOME INSECURITY: IN THE PAST 12 MONTHS HAS THE ELECTRIC, GAS, OIL, OR WATER COMPANY THREATENED TO SHUT OFF SERVICES IN YOUR HOME?: NO

## 2025-05-12 SDOH — HEALTH STABILITY: MENTAL HEALTH: HOW MANY DRINKS CONTAINING ALCOHOL DO YOU HAVE ON A TYPICAL DAY WHEN YOU ARE DRINKING?: PATIENT DOES NOT DRINK

## 2025-05-12 SDOH — SOCIAL STABILITY: SOCIAL INSECURITY: WITHIN THE LAST YEAR, HAVE YOU BEEN AFRAID OF YOUR PARTNER OR EX-PARTNER?: NO

## 2025-05-12 SDOH — ECONOMIC STABILITY: FOOD INSECURITY: WITHIN THE PAST 12 MONTHS, YOU WORRIED THAT YOUR FOOD WOULD RUN OUT BEFORE YOU GOT THE MONEY TO BUY MORE.: NEVER TRUE

## 2025-05-12 SDOH — SOCIAL STABILITY: SOCIAL INSECURITY: WITHIN THE LAST YEAR, HAVE YOU BEEN HUMILIATED OR EMOTIONALLY ABUSED IN OTHER WAYS BY YOUR PARTNER OR EX-PARTNER?: NO

## 2025-05-12 SDOH — HEALTH STABILITY: MENTAL HEALTH: HOW OFTEN DO YOU HAVE A DRINK CONTAINING ALCOHOL?: NEVER

## 2025-05-12 SDOH — SOCIAL STABILITY: SOCIAL INSECURITY: ABUSE: ADULT

## 2025-05-12 SDOH — ECONOMIC STABILITY: FOOD INSECURITY: WITHIN THE PAST 12 MONTHS, THE FOOD YOU BOUGHT JUST DIDN'T LAST AND YOU DIDN'T HAVE MONEY TO GET MORE.: NEVER TRUE

## 2025-05-12 SDOH — SOCIAL STABILITY: SOCIAL INSECURITY: HAS ANYONE EVER THREATENED TO HURT YOUR FAMILY OR YOUR PETS?: NO

## 2025-05-12 SDOH — ECONOMIC STABILITY: HOUSING INSECURITY: DO YOU FEEL UNSAFE GOING BACK TO THE PLACE WHERE YOU LIVE?: NO

## 2025-05-12 SDOH — SOCIAL STABILITY: SOCIAL INSECURITY: HAVE YOU HAD THOUGHTS OF HARMING ANYONE ELSE?: NO

## 2025-05-12 SDOH — SOCIAL STABILITY: SOCIAL INSECURITY: DO YOU FEEL UNSAFE GOING BACK TO THE PLACE WHERE YOU ARE LIVING?: NO

## 2025-05-12 SDOH — SOCIAL STABILITY: SOCIAL INSECURITY: DO YOU FEEL ANYONE HAS EXPLOITED OR TAKEN ADVANTAGE OF YOU FINANCIALLY OR OF YOUR PERSONAL PROPERTY?: NO

## 2025-05-12 SDOH — SOCIAL STABILITY: SOCIAL INSECURITY: ARE THERE ANY APPARENT SIGNS OF INJURIES/BEHAVIORS THAT COULD BE RELATED TO ABUSE/NEGLECT?: NO

## 2025-05-12 ASSESSMENT — PAIN DESCRIPTION - LOCATION
LOCATION: ABDOMEN

## 2025-05-12 ASSESSMENT — PAIN - FUNCTIONAL ASSESSMENT

## 2025-05-12 ASSESSMENT — PAIN DESCRIPTION - DESCRIPTORS
DESCRIPTORS: BURNING

## 2025-05-12 ASSESSMENT — COGNITIVE AND FUNCTIONAL STATUS - GENERAL
DRESSING REGULAR UPPER BODY CLOTHING: A LITTLE
PATIENT BASELINE BEDBOUND: NO
MOBILITY SCORE: 20
WALKING IN HOSPITAL ROOM: A LITTLE
DAILY ACTIVITIY SCORE: 19
HELP NEEDED FOR BATHING: A LITTLE
STANDING UP FROM CHAIR USING ARMS: A LITTLE
TOILETING: A LITTLE
DRESSING REGULAR LOWER BODY CLOTHING: A LITTLE
MOVING TO AND FROM BED TO CHAIR: A LITTLE
PERSONAL GROOMING: A LITTLE
CLIMB 3 TO 5 STEPS WITH RAILING: A LITTLE

## 2025-05-12 ASSESSMENT — ACTIVITIES OF DAILY LIVING (ADL)
DRESSING YOURSELF: INDEPENDENT
LACK_OF_TRANSPORTATION: NO
TOILETING: INDEPENDENT
WALKS IN HOME: INDEPENDENT
ADEQUATE_TO_COMPLETE_ADL: YES
PATIENT'S MEMORY ADEQUATE TO SAFELY COMPLETE DAILY ACTIVITIES?: YES
FEEDING YOURSELF: INDEPENDENT
HEARING - LEFT EAR: FUNCTIONAL
BATHING: INDEPENDENT
GROOMING: INDEPENDENT
HEARING - RIGHT EAR: FUNCTIONAL
JUDGMENT_ADEQUATE_SAFELY_COMPLETE_DAILY_ACTIVITIES: YES

## 2025-05-12 ASSESSMENT — COLUMBIA-SUICIDE SEVERITY RATING SCALE - C-SSRS
2. HAVE YOU ACTUALLY HAD ANY THOUGHTS OF KILLING YOURSELF?: NO
6. HAVE YOU EVER DONE ANYTHING, STARTED TO DO ANYTHING, OR PREPARED TO DO ANYTHING TO END YOUR LIFE?: NO
1. IN THE PAST MONTH, HAVE YOU WISHED YOU WERE DEAD OR WISHED YOU COULD GO TO SLEEP AND NOT WAKE UP?: NO

## 2025-05-12 ASSESSMENT — PAIN SCALES - GENERAL
PAINLEVEL_OUTOF10: 6
PAINLEVEL_OUTOF10: 2
PAINLEVEL_OUTOF10: 8
PAINLEVEL_OUTOF10: 8
PAINLEVEL_OUTOF10: 0 - NO PAIN
PAINLEVEL_OUTOF10: 3
PAINLEVEL_OUTOF10: 3
PAINLEVEL_OUTOF10: 4
PAINLEVEL_OUTOF10: 2
PAINLEVEL_OUTOF10: 0 - NO PAIN
PAINLEVEL_OUTOF10: 6
PAINLEVEL_OUTOF10: 2
PAINLEVEL_OUTOF10: 6

## 2025-05-12 ASSESSMENT — LIFESTYLE VARIABLES
HOW MANY STANDARD DRINKS CONTAINING ALCOHOL DO YOU HAVE ON A TYPICAL DAY: PATIENT DOES NOT DRINK
SKIP TO QUESTIONS 9-10: 1
SKIP TO QUESTIONS 9-10: 1
AUDIT-C TOTAL SCORE: 0
AUDIT-C TOTAL SCORE: 0
HOW OFTEN DO YOU HAVE A DRINK CONTAINING ALCOHOL: NEVER
HOW OFTEN DO YOU HAVE 6 OR MORE DRINKS ON ONE OCCASION: NEVER
AUDIT-C TOTAL SCORE: 0

## 2025-05-12 ASSESSMENT — PATIENT HEALTH QUESTIONNAIRE - PHQ9
1. LITTLE INTEREST OR PLEASURE IN DOING THINGS: NOT AT ALL
SUM OF ALL RESPONSES TO PHQ9 QUESTIONS 1 & 2: 0
2. FEELING DOWN, DEPRESSED OR HOPELESS: NOT AT ALL

## 2025-05-12 NOTE — DISCHARGE INSTRUCTIONS
Plastic Surgery                 Post Operative Instructions    Office Phone: [phone] or contact central scheduling  After-Hours Patient line: [phone]  (Use this number for medical questions/concerns only after 4pm or on the weekends)    1. Pain Management  Medications: Take pain medications as prescribed. If you were given narcotic pain relievers, take them only as needed. You may also take over-the-counter pain relievers, like acetaminophen (Tylenol) or ibuprofen (Advil), if approved by your doctor.     2. Wound Care  Dressing: Keep the surgical dressings clean and dry. You may shower and are encouraged to shower daily.    Incision Care: Clean the incisions gently with mild soap and water (if instructed) and pat dry with a clean towel. Avoid scrubbing the area.    Signs of Infection: Watch for signs of infection, such as increased redness, warmth, discharge, or fever. If these occur, contact your surgeon immediately.    Drain Care (if applicable): If you have surgical drains, empty them as instructed and keep track of the amount of fluid. Your surgeon will provide instructions on how and when to remove the drains.    3. Mobility and Activity Restrictions  Rest: Rest for the first few days to allow your body to begin the healing process.    Limited Movement: Avoid heavy lifting, bending, or straining for at least 4 to 6 weeks, or as directed by your surgeon. You may be advised to walk short distances to promote circulation.    No Exercise: Avoid strenuous exercise or activity that could strain the abdomen for at least 4 to 6 weeks.    Sleep Position: Sleep with your head elevated (a few pillows) and knees slightly bent to reduce pressure on the abdomen.    4. Compression Garment  Wear Compression Garment: Your surgeon will provide or recommend a compression garment. Wear it as directed, usually 24/7 for the first few weeks, to help reduce swelling, support the new abdominal contour, and encourage  healing.    5. Diet and Hydration  Hydration: Drink plenty of water to stay hydrated, as proper hydration helps with healing.    Diet: Eat a well-balanced diet rich in proteins, fruits, and vegetables to support your body’s recovery. Avoid alcohol and smoking as they can impair healing.    6. Follow-Up Appointments  Schedule Follow-Up: Attend all follow-up appointments with your surgeon to monitor your recovery and make adjustments to your care plan if necessary. These appointments are essential to track progress and address any concerns.    Monitor for Complications: Be on the lookout for signs of complications such as excessive swelling, pain, or unusual discharge from the wound.    7. Signs to Contact Your Surgeon  Severe pain that is not controlled by medications    Redness, warmth, or discharge from the incisions    Fever over 101°F (38.3°C)    Swelling or redness that worsens instead of improving    Shortness of breath or chest pain    Any other concerns that seem unusual    8. Smoking and Alcohol  Avoid Smoking: Smoking can interfere with healing by restricting blood flow. It is strongly advised to avoid smoking for at least 6 weeks post-surgery.    Avoid Alcohol: Alcohol can interfere with pain management and the healing process, so avoid drinking for the first few weeks following surgery.    9. Returning to Work  Depending on your job, you may return to work within 2-6 weeks, but avoid any physically demanding work or activities until your surgeon gives the go-ahead.         If you require medical assistance after 4pm or on the weekends please call the after hour patient line at [phone]

## 2025-05-12 NOTE — OP NOTE
Date: 2025  OR Location: [facility] OR    Name: Alisha Springer : 1965, Age: 59 y.o., MRN: [id number], Sex: female    Diagnosis  Pre-op Diagnosis      * Cystocele with prolapse [N81.4] Post-op Diagnosis     * Cystocele with prolapse [N81.4]     Procedures  Sacrospinous ligament suspension (left-sided)  Posterior repair    Ventral hernia repair, [id number] abdominal panniculectomy (Dr. Allison)    Surgeons   Panel 1:     * José Luis Mccray - Primary  Panel 2:     * Charlie Allison - Primary    Resident/Fellow/Other Assistant:  Tisha Ball MD - Fellow    Staff:   Circulator: Sravanthi Urias Person: Elizabeth  Surgical Assistant: Dana    Anesthesia Staff: Anesthesiologist: DO EVERARDO Christianson-AA: SHANELLE Sherwood    Procedure Summary  Anesthesia: Anesthesia type not filed in the log.  ASA: II  Estimated Blood Loss: 50mL    Intra-op Medications:   Administrations occurring from 0730 to 1135 on 25:   Medication Name Total Dose   lidocaine-epinephrine PF (Xylocaine W/EPI) 1 %-1:200,000 injection 5 mL   clindamycin (Cleocin)  mg in 50 mL D5W - premix 900 mg   dexAMETHasone (Decadron) 4 mg/mL IV Syringe 2 mL 8 mg   fentaNYL (Sublimaze) injection 50 mcg/mL 100 mcg   glycopyrrolate (Robinul) injection 0.1 mg   HYDROmorphone (Dilaudid) injection 1 mg/mL 0.5 mg   LR bolus Cannot be calculated   lidocaine (Xylocaine) injection 2 % 100 mg   midazolam (Versed) injection 1 mg/mL 1 mg   ondansetron (Zofran) 2 mg/mL injection 4 mg   propofol (Diprivan) injection 10 mg/mL 200 mg   rocuronium (Zemuron) 50 mg/5 mL prefilled syringe 90 mg              Anesthesia Record               Intraprocedure I/O Totals          Output    Urine 300 mL    Est. Blood Loss 50 mL    Total Output 350 mL          Specimen: No specimens collected          Procedure Details:  The patient was seen in the preoperative area. The site of surgery was properly noted/marked if necessary per policy. The patient has been actively warmed in  preoperative area. Preoperative antibiotics have been ordered and given within 1 hours of incision. Venous thrombosis prophylaxis have been ordered including bilateral sequential compression devices    Findings: EUA with descent of anterior wall to 0 station and C point to -1 at beginning of case.  After completion of SSLF (left-sided) all compartments well-supported without need for additional anterior repair though redundancy of of bladder neck noted. Cystoscopy with bilateral ureteral spill and no injuries.  Negative rectal exam at conclusion of case.      After informed consent was obtained, the patient was taken to the operating room where general anesthesia via oral ETT was administered. She was placed in the dorsal high lithotomy position using yellow fin stirrups, being careful not to hyperflex or hyperabduct the legs. Arms were kept on arm boards at the sides, SCD stockings were placed, and a time-out was performed. She was then prepped and draped in the usual sterile fashion. She received Clindamycin for prophylaxis due to allergies.    2 Allis clamps were used to grasp the lateral aspects of the posterior vagina. Using 1% lidocaine with epinephrine, the vaginal mucosa was injected. A scalpel was used to incise the posterior aspect of the vagina. The fibromuscular wall was then sharply dissected off the overlying vaginal mucosa to the level of the cervix.     The rectum was mobilized. Using careful blunt dissection, the pararectal tissue and fibromuscular vaginal wall was further mobilized on the patients right down to the level of the iscial spine on the left side. This was easily palpated. The peritoneum was not entered during this dissection. The sacrospinous ligament on the left was easily palpated from its source on the ischial spine as it coursed posteriorly and medially towards the sacrum.      A Capio device was then loaded with an 0 PDS suture and introduced over the sacrospinous ligament. With  the ischial spine palpated just laterally and the rectum retracted medially, the Capio device was fired and the PDS suture pulled through the ligament. Excellent placement was confirmed on palpation. A second suture of the same was then placed 0.5-1cm medial to the previous suture. Excellent placement was similarly confirmed on palpation. A rectal exam noted no rectal involvement. The PDS sutures were then taken through the cervix using the needles and a free Dunn needle on the free end. These were tagged. The rectocele was then repaired using juventino plication with multiple 0-vicryl sutures. Excess epithelium was trimmed. The epithelium was reapproximated with a running 2-0 vicryl stitch to the level of the hymen.     The vásquez was then removed and cystoscopy was performed revealing a normal bladder with bilateral ureteral spill, and the procedure was completed. The vásquez was replaced and vagina was packed with a lubricated pediatric tape.      The sponge and instrument counts were correct. The patient tolerated the procedure well and was taken out of lithotomy position. Care transitioned to Dr. Allison's team to complete remainder of surgery.    Dr. Mccray was present and scrubbed for entire case.    Tisha Ball MD  Urogynecology and Reproductive Pelvic Surgery Fellow        Complications:  None; patient tolerated the procedure well.     Disposition: PACU - hemodynamically stable.  Condition: stable

## 2025-05-12 NOTE — ANESTHESIA PROCEDURE NOTES
Airway  Date/Time: 5/12/2025 7:54 AM  Reason: elective    Airway not difficult    Staffing  Performed: SHANELLE   Authorized by: SHANELLE Sherwood    Performed by: SHANELLE Sherwood  Patient location during procedure: OR    Patient Condition  Indications for airway management: anesthesia and airway protection  Patient position: sniffing  MILS maintained throughout  Sedation level: deep     Final Airway Details   Preoxygenated: yes  Final airway type: endotracheal airway  Successful airway: ETT  Cuffed: yes   Successful intubation technique: direct laryngoscopy  Adjuncts used in placement: intubating stylet  Endotracheal tube insertion site: oral  Blade: Sky  Blade size: #3  ETT size (mm): 7.0  Cormack-Lehane Classification: grade IIa - partial view of glottis  Placement verified by: capnometry and palpation of cuff   Measured from: lips  ETT to lips (cm): 22  Ventilation between attempts: none  Number of attempts at approach: 1  Number of other approaches attempted: 0

## 2025-05-12 NOTE — ANESTHESIA POSTPROCEDURE EVALUATION
Patient: Alisha Springer    Procedure Summary       Date: 05/12/25 Room / Location: [facility] OR 08 / Virtual [facility] OR    Anesthesia Start: 0747 Anesthesia Stop: 1326    Procedures:       COLPOPEXY, SACROSPINOUS (Vagina )      DERMATOLIPECTOMY, ABDOMEN (Abdomen)      Repair Abdominal Wall (Abdomen)      REPAIR, HERNIA, VENTRAL (Abdomen) Diagnosis:       Cystocele with prolapse      (Cystocele with prolapse [N81.4])    Surgeons: José Luis Mccray MD; Charlie Allison MD Responsible Provider: Herberth Luis DO    Anesthesia Type: general ASA Status: 2            Anesthesia Type: general    Vitals Value Taken Time   /57 05/12/25 14:00   Temp 36.9 °C (98.4 °F) 05/12/25 13:25   Pulse 79 05/12/25 14:11   Resp 11 05/12/25 14:11   SpO2 97 % 05/12/25 14:11   Vitals shown include unfiled device data.    Anesthesia Post Evaluation    Patient location during evaluation: PACU  Patient participation: complete - patient participated  Level of consciousness: awake and alert  Pain management: adequate  Airway patency: patent  Cardiovascular status: acceptable  Respiratory status: acceptable  Hydration status: acceptable  Postoperative Nausea and Vomiting: none        No notable events documented.

## 2025-05-12 NOTE — ANESTHESIA PREPROCEDURE EVALUATION
Patient: Alisha Springer    Procedure Information       Date/Time: 05/12/25 0730    Procedures:       COLPOPEXY, SACROSPINOUS      DERMATOLIPECTOMY, ABDOMEN - [id number] pannicuelctomy with ventral hernia repair      Repair Abdominal Wall      REPAIR, HERNIA, VENTRAL    Location: [facility] OR 08 / [facility]    Surgeons: José Luis Mccray MD; Charlie Allison MD            Relevant Problems   Pulmonary   (+) Asthma      GI   (+) Gastroesophageal reflux disease       Clinical information reviewed:   Tobacco  Allergies  Meds   Med Hx  Surg Hx  OB Status  Fam Hx  Soc   Hx        NPO Detail:  NPO/Void Status  Carbohydrate Drink Given Prior to Surgery? : N  Date of Last Liquid: 05/11/25  Time of Last Liquid: 2200  Date of Last Solid: 05/11/25  Time of Last Solid: 1600  Last Intake Type: Clear fluids  Time of Last Void: 0628         Physical Exam    Airway  Mallampati: II  TM distance: >3 FB  Neck ROM: full  Mouth opening: 3 or more finger widths     Cardiovascular - normal exam   Dental    Pulmonary - normal exam   Abdominal - normal exam           Anesthesia Plan    History of general anesthesia?: yes  History of complications of general anesthesia?: no    ASA 2     general     intravenous induction   Anesthetic plan and risks discussed with patient.    Plan discussed with CRNA.

## 2025-05-12 NOTE — OP NOTE
Plastic Surgery Operative Note       Date: 2025  OR Location: [facility]    Name: Alisha Springer : 1965, Age: 59 y.o., MRN: [id number], Sex: female    Diagnosis  Pre-op Diagnosis      * Cystocele with prolapse [N81.4] Post-op Diagnosis     * Cystocele with prolapse [N81.4]     Procedures  Iwywr-ox-jta panniculectomy-increased modifier was used for increased procedural time and service required secondary history of scarring and cicatrix due to previous operations  Repair of recurrent ventral hernia reducible, 3-10 cm, again increased modifier was used due to history of repeat operation here and significant scarring  Use of bioabsorbable mesh  Injection of multiple intercostal nerves for postoperative analgesia not intraoperative pain control  Removal of foreign bodies/previous suture material    Surgeons   Panel 1:     * José Luis Mccray - Primary  Panel 2:     * Charlie Allison - Primary    Resident/Fellow/Other Assistant:  Surgeons and Role:  * No surgeons found with a matching role *    Staff:   Circulator: Sravanthi Urias Person: Elizabeth  Surgical Assistant: Dana    Anesthesia Staff: Anesthesiologist: Herberth Luis DO  C-AA: SHANELLE Sherwood    Procedure Summary  Anesthesia: Anesthesia type not filed in the log.  ASA: II  Estimated Blood Loss: 20mL  Intra-op Medications:   Administrations occurring from 0730 to 1135 on 25:   Medication Name Total Dose   lidocaine-epinephrine PF (Xylocaine W/EPI) 1 %-1:200,000 injection 5 mL   clindamycin (Cleocin)  mg in 50 mL D5W - premix 900 mg   dexAMETHasone (Decadron) 4 mg/mL IV Syringe 2 mL 8 mg   fentaNYL (Sublimaze) injection 50 mcg/mL 100 mcg   glycopyrrolate (Robinul) injection 0.1 mg   HYDROmorphone (Dilaudid) injection 1 mg/mL 1.25 mg   LR bolus Cannot be calculated   lidocaine (Xylocaine) injection 2 % 100 mg   midazolam (Versed) injection 1 mg/mL 1 mg   ondansetron (Zofran) 2 mg/mL injection 4 mg   propofol (Diprivan) injection 10 mg/mL 200  mg   rocuronium (Zemuron) 50 mg/5 mL prefilled syringe 140 mg              Anesthesia Record               Intraprocedure I/O Totals          Intake    LR bolus 1000.00 mL    Total Intake 1000 mL       Output    Urine 300 mL    Est. Blood Loss 50 mL    Total Output 350 mL       Net    Net Volume 650 mL          Specimen: No specimens collected              Drains and/or Catheters:   Closed/Suction Drain RLQ Other (Comment) 15 Fr. (Active)       Closed/Suction Drain LLQ Other (Comment) 15 Fr. (Active)       Urethral Catheter (Active)       Urethral Catheter (Active)       Tourniquet Times:         Implants:  Implants       Type Name Action Serial No.       TIGR MATRI[employer] SURGICAL MESH Implanted               Findings: Reducible, recurrent, fat-containing ventral hernia approximately 3.5 cm cephalad from the umbilicus, significant cicatrix and scarring requiring increased procedural time and service   Indications: Alisha Springer is an 59 y.o. female who is having surgery for Cystocele with prolapse [N81.4].  She endorses her highest weight was 304lbs, her lowest after weight loss was 119lbs and her current weight I 135 which she endorses she has been stable at for greater than 1 year. She endorses that since significant weight loss she has loose skin of the abdomen that hangs and causes her rashes and occasional sores. She has complaints of odor coming from the skin folds of hr abdomen. She was seen by her dermatologist for these rashes and skin concerns and was prescribed topical clindamycin and hibiclens for daily use. She has been doing this for 2 years and is continuing to have break through skin irritation despite this management. She endorses in the summer the skin is wet and macerated with odor.      She also notes that with weight loss she has increased hanging of her breasts. She states that this places stress on her back and shoulders causing back and shoulder pain. She has attempted OTC pain medications  (tylenol and ibuprofen) and massage without improvement in symptoms. Se has bra strap grooving from having to wear her bras so tight to support the breasts.      PMH: asthma  Surgical Hx: ectopic pregnancy, esohageal fundoplication, hernia repair with mesh. Bladder sling for cystocele, and total hysterectomy   . The patient has a hanging apron of skin and fat below the level of the inferior pubis ramus. On exam there are 2 rolls present above and below the umbilicus with laxity of the skin in both the horizontal and vertical dimensions. Grade 3 pannus. There is maceration of the skin of the infrapannicular skin. There is hyperpigmentation of the skin in the infrapannicular fold. Due to multidimensional skin laxity doart-vj-kvh abdominoplasty wound improve and restore these findings. She has a history of abdominal hernia and repair.     The patient has a hanging apron of skin and fat below the waist.  This extra skin is causing deep sores which have to be treated with oral medicine and affects the patient's ability to complete activities of daily living such as grooming and dressing.  Abdominal panniculectomy would improve and restore these things.     Alisha has a chronic intertrigo and rashing that has been recalcitrant to medical and nonsurgical therapy for over 3 months including antifungals, steroid creams, and antibiotics.  Weight has been stable for the last 6 months.  She practices good hygiene.         She additionally has complaints of back and shoulder pain due to pendulous breasts following weight loss.  I am going to refer her to our physical therapy colleagues to address this nonsurgically first. We will have her follow up with our office after completion of physical therapy for reevaluation.      CT abdomen/pelvis performed on 11/5/2024: This shows a fat-containing ventral abdominal hernia noted through a diastases measuring 1.5 cm, the hernia sac measures 2.3 x 1.1 x 2.7 cm     We discussed the  possibility of removing the umbilicus     Plan:   Kevin abdominal panniculectomy  Ventral hernia repair, with mesh      INFORMED CONSENT  Patient was told the risks, benefits, INDICATIONS, contraindications, and alternatives to the procedure.  Risks include but not limited to infection, injury to neurovascular and tendinous structures, cosmetic dissatisfaction, scar asymmetry, vertical scar off midline, hypoperfusion of umbilicus, cosmetic dissatisfaction umbilicus, umbilical partial loss, total umbilical loss, T point delayed wound healing, T point necrosis, seroma requiring extended drainage procedures and extended drain, and need for subsequent surgeries.   The patient demonstrated understanding of these risks and agreed to proceed with surgery.   Advance directives discussed.  Team approach explained.        The patient consented and wished to proceed with the procedure and for medical photography if needed.       PROCEDURAL PAUSE   Prior to the beginning of the procedure, the patient's correct identity, side, site, and procedure to be performed were verified.  The patient was given intravenous antibiotics prior to skin incision.          PROCEDURAL NOTE  Alisha  was brought to the operative theater at which point the patient was transferred to the operating room table.  All bony prominences were well padded, and sequential compression devices were placed to each lower extremity. Patient was then secured with safety straps.    The patient was then placed under IV sedation, and our anesthesia colleagues administered general endotracheal anesthesia      In the preoperative anesthesia area, a transverse incision was marked, 7.5 cm cephalad to the vaginal introitus with the skin on stretch.  It was extended laterally to approximately the anterior axillary line.  In the standing and supine positions we used bimanual palpation to terry the anticipated vertical skin excess and marked this with the patient  standing and laying supine as well as in reflex position.   In the operating room, the patient was then prepped and draped in a standard sterile fashion.    We began by making incision with a 10 blade scalpel along the inferior transverse incision line, the incision was angled cephalad to avoid the inguinal perforators.  We used unipolar cautery to dissect down to the level of the ASIS to the anterior abdominal fascia, we then dissected medially, identifying and protecting and clipping the superficial inferior pregastric vessels.  Once the entire inferior incision was made, we dissected cephalad until approximately the level of 2 cm proximal to the umbilicus.  We then excised the umbilicus carefully keeping a wide swath of tissue around the umbilical stalk to maximize perfusion to the umbilicus.  There was significant scarring from previous surgeries, therefore this caused increased procedural time and service.  Therefore increased modifier was used.  We then dissected cephalad from the umbilicus approximately 3 cm and we identified a fat-containing approximate 3 cm ventral hernia, recurrent, we also removed previously placed foreign body-suture material.  We then dissected free the hernia and placed it back into the hernia sac.  We then imbricated the ventral hernia and performed rectus plication over the imbrication.  A TIGR matrix surgical mesh was then placed above the imbrication for further soft tissue support of the hernia.      After this we dissected cephalad along the costal margin to the level of the xiphoid process.    We then performed bimanual palpation with the patient and reflex position in order to estimate the amount of skin that could be removed while preventing excess tension of the T point.  After this was confirmed, the cephalad and vertical incision were made with a 10 blade scalpel, and dissection was carried down and with unipolar cautery, beveling in order to retain Candida's fascia for  closure.  Meticulous hemostasis was obtained with unipolar, bipolar cautery as well as surgical clips.  We then performed provisional closure and confirmed our specimen margins and that the closure would be tension-free.  We also confirmed that the umbilicus was in line with the previously marked midline of the vaginal introitus.    We then placed two 15 Bulgarian SUNSHINE drains exiting lateral to the lateral extent of the incision    We then performed transverse abdominis plane blocks using 50% Marcaine mixed 1: 1 with 1% lidocaine with epinephrine.    We then began closure, 0 PDS was used in Candida's fascia from midline to the level of the ASIS bilaterally.  A 2-0 PDS suture was then used to close the Candida layer remaining from the ASIS to the lateralmost extent of the incision.  A 2-0 STRATAFIX suture was run in the remainder of the camper's fascia and turned back and run and in the deep dermal layer this was followed by running 3-0 STRATAFIX in the subcuticular layer.  Between the umbilicus and the transverse incision we closed with 0 PDS, followed by 2-0 PDS, followed by a running 3-0 Monocryl suture.  The umbilicus was then delivered, lateral semicircular design was excised using 15 blade scalpel and the umbilicus was then inset with interrupted 3-0 Monocryl suture followed by running 3-0 Monocryl subdermal suture followed by running 5-0 fast gut suture in the skin.    Final dressings consisted of  Sylke adhesive tape, followed by ABDs, and abdominal binder.        The patient tolerated the procedure well and was awakened from anesthesia without any difficulties, was transferred to the patient in stable condition, all needle counts were correct.       POST OP PLAN: Alisha Springer will remain an outpatient, and will be discharged when medically stable.  They are prescribed Flexeril, narcotic pain control, and antibiotics, instructed to strip, empty, record drain outputs twice daily.

## 2025-05-12 NOTE — CARE PLAN
The patient's goals for the shift include  comfort and rest     The clinical goals for the shift include pt will report decreased pain through the end of the shift

## 2025-05-12 NOTE — INTERVAL H&P NOTE
H&P reviewed. The patient was examined and there are no changes to the H&P.    Tisha aBll MD  Urogynecology and Reproductive Pelvic Surgery Fellow

## 2025-05-13 ENCOUNTER — PHARMACY VISIT (OUTPATIENT)
Dept: PHARMACY | Facility: CLINIC | Age: 60
End: 2025-05-13
Payer: MEDICARE

## 2025-05-13 VITALS
BODY MASS INDEX: 23.92 KG/M2 | HEART RATE: 80 BPM | OXYGEN SATURATION: 98 % | RESPIRATION RATE: 18 BRPM | TEMPERATURE: 97.7 F | SYSTOLIC BLOOD PRESSURE: 149 MMHG | HEIGHT: 63 IN | DIASTOLIC BLOOD PRESSURE: 77 MMHG | WEIGHT: 135 LBS

## 2025-05-13 PROCEDURE — 2500000004 HC RX 250 GENERAL PHARMACY W/ HCPCS (ALT 636 FOR OP/ED): Mod: JZ | Performed by: PHYSICIAN ASSISTANT

## 2025-05-13 PROCEDURE — 7100000011 HC EXTENDED STAY RECOVERY HOURLY - NURSING UNIT

## 2025-05-13 PROCEDURE — 2500000001 HC RX 250 WO HCPCS SELF ADMINISTERED DRUGS (ALT 637 FOR MEDICARE OP): Performed by: SURGERY

## 2025-05-13 PROCEDURE — 96372 THER/PROPH/DIAG INJ SC/IM: CPT | Performed by: PHYSICIAN ASSISTANT

## 2025-05-13 PROCEDURE — 2500000001 HC RX 250 WO HCPCS SELF ADMINISTERED DRUGS (ALT 637 FOR MEDICARE OP): Performed by: PHYSICIAN ASSISTANT

## 2025-05-13 RX ORDER — CYCLOBENZAPRINE HCL 5 MG
5 TABLET ORAL 3 TIMES DAILY PRN
Status: DISCONTINUED | OUTPATIENT
Start: 2025-05-13 | End: 2025-05-13 | Stop reason: HOSPADM

## 2025-05-13 RX ADMIN — CYCLOBENZAPRINE HYDROCHLORIDE 5 MG: 5 TABLET, FILM COATED ORAL at 10:24

## 2025-05-13 RX ADMIN — CYCLOBENZAPRINE HYDROCHLORIDE 5 MG: 5 TABLET, FILM COATED ORAL at 14:38

## 2025-05-13 RX ADMIN — HEPARIN SODIUM 5000 UNITS: 5000 INJECTION, SOLUTION INTRAVENOUS; SUBCUTANEOUS at 16:30

## 2025-05-13 RX ADMIN — SODIUM CHLORIDE, SODIUM LACTATE, POTASSIUM CHLORIDE, AND CALCIUM CHLORIDE 75 ML/HR: .6; .31; .03; .02 INJECTION, SOLUTION INTRAVENOUS at 10:46

## 2025-05-13 RX ADMIN — ACETAMINOPHEN 650 MG: 325 TABLET ORAL at 16:29

## 2025-05-13 RX ADMIN — ACETAMINOPHEN 650 MG: 325 TABLET ORAL at 10:00

## 2025-05-13 RX ADMIN — OXYCODONE HYDROCHLORIDE 5 MG: 5 TABLET ORAL at 11:48

## 2025-05-13 RX ADMIN — ONDANSETRON 4 MG: 4 TABLET, ORALLY DISINTEGRATING ORAL at 11:34

## 2025-05-13 RX ADMIN — OXYCODONE HYDROCHLORIDE 5 MG: 5 TABLET ORAL at 16:32

## 2025-05-13 RX ADMIN — MONTELUKAST 10 MG: 10 TABLET, FILM COATED ORAL at 10:00

## 2025-05-13 RX ADMIN — OXYCODONE HYDROCHLORIDE 5 MG: 5 TABLET ORAL at 07:39

## 2025-05-13 RX ADMIN — ACETAMINOPHEN 650 MG: 325 TABLET ORAL at 04:39

## 2025-05-13 RX ADMIN — DOCUSATE SODIUM 100 MG: 100 CAPSULE, LIQUID FILLED ORAL at 10:00

## 2025-05-13 RX ADMIN — HEPARIN SODIUM 5000 UNITS: 5000 INJECTION, SOLUTION INTRAVENOUS; SUBCUTANEOUS at 04:39

## 2025-05-13 RX ADMIN — HYDROMORPHONE HYDROCHLORIDE 0.2 MG: 0.2 INJECTION, SOLUTION INTRAMUSCULAR; INTRAVENOUS; SUBCUTANEOUS at 02:29

## 2025-05-13 ASSESSMENT — COGNITIVE AND FUNCTIONAL STATUS - GENERAL
TOILETING: A LITTLE
TOILETING: A LITTLE
WALKING IN HOSPITAL ROOM: A LITTLE
STANDING UP FROM CHAIR USING ARMS: A LITTLE
STANDING UP FROM CHAIR USING ARMS: A LITTLE
MOBILITY SCORE: 20
DRESSING REGULAR LOWER BODY CLOTHING: A LITTLE
HELP NEEDED FOR BATHING: A LITTLE
PERSONAL GROOMING: A LITTLE
MOVING TO AND FROM BED TO CHAIR: A LITTLE
MOVING TO AND FROM BED TO CHAIR: A LITTLE
DRESSING REGULAR UPPER BODY CLOTHING: A LITTLE
WALKING IN HOSPITAL ROOM: A LITTLE
PERSONAL GROOMING: A LITTLE
DRESSING REGULAR LOWER BODY CLOTHING: A LITTLE
DAILY ACTIVITIY SCORE: 19
DRESSING REGULAR UPPER BODY CLOTHING: A LITTLE
HELP NEEDED FOR BATHING: A LITTLE
DAILY ACTIVITIY SCORE: 19
MOBILITY SCORE: 20
CLIMB 3 TO 5 STEPS WITH RAILING: A LITTLE
CLIMB 3 TO 5 STEPS WITH RAILING: A LITTLE

## 2025-05-13 ASSESSMENT — PAIN - FUNCTIONAL ASSESSMENT
PAIN_FUNCTIONAL_ASSESSMENT: 0-10

## 2025-05-13 ASSESSMENT — PAIN SCALES - GENERAL
PAINLEVEL_OUTOF10: 6
PAINLEVEL_OUTOF10: 5 - MODERATE PAIN
PAINLEVEL_OUTOF10: 5 - MODERATE PAIN
PAINLEVEL_OUTOF10: 10 - WORST POSSIBLE PAIN
PAINLEVEL_OUTOF10: 0 - NO PAIN

## 2025-05-13 ASSESSMENT — PAIN DESCRIPTION - DESCRIPTORS
DESCRIPTORS: BURNING
DESCRIPTORS: BURNING

## 2025-05-13 ASSESSMENT — PAIN DESCRIPTION - ORIENTATION: ORIENTATION: RIGHT;LEFT

## 2025-05-13 ASSESSMENT — PAIN DESCRIPTION - LOCATION
LOCATION: ABDOMEN
LOCATION: ABDOMEN

## 2025-05-13 ASSESSMENT — PAIN SCALES - WONG BAKER: WONGBAKER_NUMERICALRESPONSE: NO HURT

## 2025-05-13 NOTE — DISCHARGE SUMMARY
Plastic Surgery Discharge Summary     Discharge Diagnosis  Cystocele with prolapse    Issues Requiring Follow-Up  Plastic Surgery follow up in 1 week   Urology follow up    Discharge Meds     Medication List      START taking these medications     clindamycin 300 mg capsule; Commonly known as: Cleocin; Take 1 capsule   (300 mg) by mouth 3 times a day for 10 days.   cyclobenzaprine 5 mg tablet; Commonly known as: Flexeril; Take 1 tablet   (5 mg) by mouth 3 times a day as needed for muscle spasms.   docusate sodium 100 mg capsule; Commonly known as: Colace; Take 1   capsule (100 mg) by mouth 2 times a day.   HYDROcodone-acetaminophen 5-325 mg tablet; Commonly known as: Norco;   Take 1 tablet by mouth every 6 hours if needed for severe pain (7 - 10).   DO NOT TAKE WITH TYLENOL     CHANGE how you take these medications     * Hibiclens 4 % external liquid; Generic drug: chlorhexidine; What   changed: Another medication with the same name was removed. Continue   taking this medication, and follow the directions you see here.   * chlorhexidine 0.12 % solution; Commonly known as: Peridex; 15   milliliter(s) orally once a day for 2 doses 15 ml  the night before   surgery and 15 ml morning of surgery - swish for 30 seconds -DO NOT   SWALLOW, SPIT OUT; What changed: Another medication with the same name was   removed. Continue taking this medication, and follow the directions you   see here.  * This list has 2 medication(s) that are the same as other medications   prescribed for you. Read the directions carefully, and ask your doctor or   other care provider to review them with you.     CONTINUE taking these medications     acetaminophen 500 mg tablet; Commonly known as: Tylenol; Take 2 tablets   (1,000 mg) by mouth every 6 hours if needed for mild pain (1 - 3).   ascorbic acid 500 mg tablet; Commonly known as: Vitamin C   cholecalciferol 25 mcg (1,000 units) tablet; Commonly known as: Vitamin   D-3   clindamycin 1 %  lotion; Commonly known as: Cleocin T   cyanocobalamin 1,000 mcg tablet; Commonly known as: Vitamin B-12   Dulera 100-5 mcg/actuation inhaler; Generic drug: mometasone-formoterol   ibuprofen 600 mg tablet; Take 1 tablet (600 mg) by mouth every 6 hours   if needed for mild pain (1 - 3).   montelukast 10 mg tablet; Commonly known as: Singulair   naproxen 500 mg tablet; Commonly known as: Naprosyn   phentermine 37.5 mg tablet; Commonly known as: Adipex-P   potassium gluconate 595 mg (99 mg) tablet   zinc gluconate 50 mg tablet     STOP taking these medications     oxyCODONE 5 mg immediate release tablet; Commonly known as: Roxicodone       Test Results Pending At Discharge  Pending Labs       No current pending labs.            Hospital Course  Alisha presented to the hospital for colpopexy, repair ventral hernia, and pdccr-nw-cfw panniculectomy. She was monitored overnight for pain control and TOV. Floey catheter was removed and patient performed void trial. If PVR greater than 150mL she will be discharged with vásquez catheter. Her pain was moderately controlled overnight. Sh was discharged home in stable condition.     Pertinent Physical Exam At Time of Discharge  Physical Exam  Gen: interactive and pleasant  Head: NCAT  Eyes: EOMI, PERRLA  Mouth: MMM  Throat: trachea midline  Cor: RRR  Pulm: nonlabored breathing  Abd: s/nt/nd  Neuro: AAOx3  Ext: extremities perfused    Focused exam of the: abdomen  Unwre-rf-wrc incisions intact with telfa dressing. There is no ecchymosis present. SUNSHINE drain x2 serosanguineous. There is expected edema without evidence of seroma or hematoma.       Outpatient Follow-Up  Future Appointments   Date Time Provider Department Center   5/19/2025 11:20 AM Yumi Bowles PA-C [id number] [place]   5/28/2025  8:00 AM SKYLAR Cao-CNP [id number] [place]         Yumi Bowles PA-C

## 2025-05-13 NOTE — PROGRESS NOTES
"Alisha Springer is a 59 y.o. female  who is s/p SSLF with SD and cystoscopy (Dr. Mccray) and Ventral hernia repair, Lcxni-qp-qst abdominal panniculectomy (Dr. Allison)      Subjective   Pain moderately well-controlled but has not yet been out of bed. Khoury remains in place. Has some L buttock pain but no pain radiating down left leg.  Pain predominantly abdominal.       Objective     Physical Exam  Constitutional:       Appearance: Normal appearance.   HENT:      Head: Normocephalic and atraumatic.   Eyes:      Extraocular Movements: Extraocular movements intact.      Pupils: Pupils are equal, round, and reactive to light.   Cardiovascular:      Rate and Rhythm: Normal rate.   Pulmonary:      Effort: Pulmonary effort is normal.   Neurological:      Mental Status: She is alert.     Abdomen soft, tender  No LE edema    Last Recorded Vitals  Blood pressure 122/58, pulse 78, temperature 37.4 °C (99.3 °F), resp. rate 16, height 1.6 m (5' 3\"), weight 61.2 kg (135 lb), SpO2 99%.  Intake/Output last 3 Shifts:  I/O last 3 completed shifts:  In: 2780 (45.4 mL/kg) [P.O.:360; I.V.:1170 (19.1 mL/kg); IV Piggyback:1250]  Out: 2315 (37.8 mL/kg) [Urine:2000 (0.9 mL/kg/hr); Drains:190; Blood:125]  Weight: 61.2 kg        This patient has a urinary catheter: to be removed today             Assessment & Plan  Cystocele with prolapse    Excess skin    S/p SSLF and SD for POP  -plan for passive void trial today  - discharge home from urogynecologic perspective pending abdominal pain management    Tisha Ball MD  Urogynecology and Reproductive Pelvic Surgery Fellow     "

## 2025-05-13 NOTE — CARE PLAN
The clinical goals for the shift include Pt will report decreased pain with medication administration.      Problem: Pain - Adult  Goal: Verbalizes/displays adequate comfort level or baseline comfort level  Outcome: Progressing     Problem: Safety - Adult  Goal: Free from fall injury  Outcome: Progressing     Problem: Pain  Goal: Takes deep breaths with improved pain control throughout the shift  Outcome: Progressing     Problem: Pain  Goal: Turns in bed with improved pain control throughout the shift  Outcome: Progressing

## 2025-05-13 NOTE — CARE PLAN
The patient's goals for the shift include      The clinical goals for the shift include Patient pain will be well-controlled throughout this shift    Problem: Safety - Adult  Goal: Free from fall injury  Outcome: Progressing     Problem: Fall/Injury  Goal: Not fall by end of shift  Outcome: Progressing     Problem: Fall/Injury  Goal: Be free from injury by end of the shift  Outcome: Progressing     Problem: Fall/Injury  Goal: Verbalize understanding of risk factor reduction measures to prevent injury from fall in the home  Outcome: Progressing     Problem: Pain  Goal: Walks with improved pain control throughout the shift  Outcome: Progressing     Problem: Pain  Goal: Free from opioid side effects throughout the shift  Outcome: Progressing

## 2025-05-13 NOTE — NURSING NOTE
Pt discharged to home with family as transport.  Pt and family verbalized understanding of discharge instructions. IV removed. No acute distress note.

## 2025-05-15 ENCOUNTER — APPOINTMENT (OUTPATIENT)
Dept: UROLOGY | Facility: CLINIC | Age: 60
End: 2025-05-15
Payer: COMMERCIAL

## 2025-05-16 ENCOUNTER — CLINICAL SUPPORT (OUTPATIENT)
Dept: UROLOGY | Facility: CLINIC | Age: 60
End: 2025-05-16
Payer: COMMERCIAL

## 2025-05-16 VITALS — HEART RATE: 82 BPM | SYSTOLIC BLOOD PRESSURE: 108 MMHG | DIASTOLIC BLOOD PRESSURE: 69 MMHG

## 2025-05-16 DIAGNOSIS — N81.4 CYSTOCELE WITH PROLAPSE: ICD-10-CM

## 2025-05-16 PROCEDURE — 99024 POSTOP FOLLOW-UP VISIT: CPT | Performed by: NURSE PRACTITIONER

## 2025-05-19 ENCOUNTER — APPOINTMENT (OUTPATIENT)
Facility: CLINIC | Age: 60
End: 2025-05-19
Payer: COMMERCIAL

## 2025-05-19 VITALS — DIASTOLIC BLOOD PRESSURE: 66 MMHG | SYSTOLIC BLOOD PRESSURE: 108 MMHG | HEART RATE: 74 BPM

## 2025-05-19 DIAGNOSIS — T14.8XXD WOUND HEALING, DELAYED: Primary | ICD-10-CM

## 2025-05-19 PROCEDURE — 99024 POSTOP FOLLOW-UP VISIT: CPT | Performed by: PHYSICIAN ASSISTANT

## 2025-05-19 RX ORDER — COLLAGENASE SANTYL 250 [ARB'U]/G
OINTMENT TOPICAL DAILY
Qty: 90 G | Refills: 0 | Status: ON HOLD | OUTPATIENT
Start: 2025-05-19 | End: 2025-05-25

## 2025-05-19 RX ORDER — ACETIC ACID 0.25 G/100ML
IRRIGANT IRRIGATION
Qty: 500 ML | Refills: 12 | Status: ON HOLD | OUTPATIENT
Start: 2025-05-19 | End: 2025-05-25

## 2025-05-19 NOTE — PROGRESS NOTES
Department of Plastic and Reconstructive Surgery            Post Operative Visit    Date: 05/19/25  Date of Surgery: 5/12/2025    Subjective   Alisha Springer is a 59 y.o. female who presents for POV. They are s/p uzdeh-xh-sgi panniculectomy, ventral hernia repair, sacrospinous ligament suspension for cystocele on 5/12/2025 with Dr. Allison and Dr. Thompson.     She presents for POV. She was discharged on 5/13 with vásquez catheter for post op acute urinary retention. Catheter was removed on 5/16. She endorses well controlled post op pain from plastic surgery standpoint. She notes she has been showering daily.       Objective   Vital Signs:   Vitals:    05/19/25 1135   BP: 108/66   Pulse: 74       Gen: interactive and pleasant  Head: NCAT  Eyes: EOMI, PERRLA  Mouth: MMM  Throat: trachea midline  Cor: RRR  Pulm: nonlabored breathing  Abd: s/nt/nd  Neuro: AAOx3  Ext: extremities perfused    Focused exam of the: abdomen   Ligrm-mk-cel incisions  with sylke. There is purulent drainage and odor from the umbilicus. There is erythema surrounding the t-point concerning for ischemia of the T-point. There is slough and drainage from the umbilicus with areas concerning for superficial ischemia of the umbilicus, there is odor from the umbilicus.     Assessment/Plan     Alisha Springer is a 59 y.o. female who presents for POV. They are s/p cysos-mm-rdm panniculectomy, ventral hernia repair, scarospinous ligament suspension for cystocele on 5/12/2025 with Dr. Allison and Dr. Thompson.     She presents for POV. SUNSHINE drains in place. No erythema or edema surrounding the drain site. There is serous output from the drain. Patient recorded output of the drains showed 2 consecutive days of less than 30cc output at time of removal. Patient was educated on purpose of surgical drains and informed of risk for seroma post drain removal.  instructed her on possibility of seroma formation, and signs and symptoms of this. We  discussed that if she does have a seroma formed, we would send her for interventional radiology percutaneous drain placement.Patient verbalized understanding.     SUNSHINE drains removed at this visit: 2    She was seen by myself and Dr Allison today. We discussed there is concern for impending t-point breakdown due to ischemia. We will start her on santyl and dry gaze now daily to the umbilicus and t-point. She is to continue daily hot showers. We will see her back in 1 week for follow up and wound check. She should continue antibiotic therapy.     Plan:   Continue antibiotics   Santyl and dry gauze daily to the belly button and t-point cover with dry gauze.   Continue hot showers  Follow up in 1 week    I spent 20 minutes with this patient. Greater than 50% of this time was spent in the counselling and/or coordination of care of this patient.  This note was created using voice recognition software and was not corrected for typographical or grammatical errors.    Signature: Yumi Bowles PA-C

## 2025-05-20 DIAGNOSIS — N81.4 CYSTOCELE WITH PROLAPSE: ICD-10-CM

## 2025-05-20 DIAGNOSIS — T88.8XXA FLUID COLLECTION AT SURGICAL SITE, INITIAL ENCOUNTER: ICD-10-CM

## 2025-05-20 PROCEDURE — RXMED WILLOW AMBULATORY MEDICATION CHARGE

## 2025-05-20 RX ORDER — HYDROCODONE BITARTRATE AND ACETAMINOPHEN 5; 325 MG/1; MG/1
1 TABLET ORAL EVERY 6 HOURS PRN
Qty: 20 TABLET | Refills: 0 | Status: CANCELLED | OUTPATIENT
Start: 2025-05-20

## 2025-05-22 ENCOUNTER — PHARMACY VISIT (OUTPATIENT)
Dept: PHARMACY | Facility: CLINIC | Age: 60
End: 2025-05-22
Payer: MEDICARE

## 2025-05-22 ENCOUNTER — OFFICE VISIT (OUTPATIENT)
Dept: PLASTIC SURGERY | Facility: CLINIC | Age: 60
End: 2025-05-22
Payer: COMMERCIAL

## 2025-05-22 VITALS — BODY MASS INDEX: 23.92 KG/M2 | WEIGHT: 135 LBS | HEIGHT: 63 IN

## 2025-05-22 DIAGNOSIS — T14.8XXD WOUND HEALING, DELAYED: ICD-10-CM

## 2025-05-22 DIAGNOSIS — R11.2 PONV (POSTOPERATIVE NAUSEA AND VOMITING): ICD-10-CM

## 2025-05-22 DIAGNOSIS — G89.18 POST-OP PAIN: Primary | ICD-10-CM

## 2025-05-22 DIAGNOSIS — Z98.890 PONV (POSTOPERATIVE NAUSEA AND VOMITING): ICD-10-CM

## 2025-05-22 PROCEDURE — 3008F BODY MASS INDEX DOCD: CPT | Performed by: PHYSICIAN ASSISTANT

## 2025-05-22 PROCEDURE — 99211 OFF/OP EST MAY X REQ PHY/QHP: CPT | Performed by: PHYSICIAN ASSISTANT

## 2025-05-22 RX ORDER — HYDROCODONE BITARTRATE AND ACETAMINOPHEN 5; 325 MG/1; MG/1
1 TABLET ORAL EVERY 6 HOURS PRN
Qty: 10 TABLET | Refills: 0 | Status: ON HOLD | OUTPATIENT
Start: 2025-05-22 | End: 2025-05-29

## 2025-05-22 RX ORDER — ONDANSETRON 4 MG/1
4 TABLET, FILM COATED ORAL EVERY 8 HOURS PRN
Qty: 20 TABLET | Refills: 0 | Status: ON HOLD | OUTPATIENT
Start: 2025-05-22 | End: 2025-05-29

## 2025-05-23 ENCOUNTER — TELEPHONE (OUTPATIENT)
Facility: CLINIC | Age: 60
End: 2025-05-23
Payer: COMMERCIAL

## 2025-05-23 NOTE — TELEPHONE ENCOUNTER
Pt contacted office stating that her left thigh area above where drain was inserted is significantly more swollen today than yesterday.  She reports it began today.  I asked her to send photo via my chart which she did.  Photo was show to dr ledbetter.  See my chart messages.  US abdomen was ordered.  Instructed pt to use compression per dr stokes recommendation and to contact office over the weekend if symptoms worsen.

## 2025-05-23 NOTE — PROGRESS NOTES
Department of Plastic and Reconstructive Surgery            Post Operative Visit    Date: 05/23/25  Date of Surgery: 5/12/2025    Subjective   Alisha Springer is a 59 y.o. female who presents for POV. They are s/p [id number] panniculectomy, ventral hernia repair, sacrospinous ligament suspension for cystocele on 5/12/2025 with Dr. Allison and Dr. Thompson.     She presents for follow up visit. Wound break down of the lower abdomen has increased since 5/19. She was given santyl however this was not covered by her insurance. She was instructed to do acetic acid damp to dry dressing changes daily. Acetic acid was unavailable at local pharmacies so she was instructed on how to make acetic acid. She has been doing packing daily. She notes drainage and odor from the wounds.       Objective   Vital Signs:   There were no vitals filed for this visit.      Gen: interactive and pleasant  Head: NCAT  Eyes: EOMI, PERRLA  Mouth: MMM  Throat: trachea midline  Cor: RRR  Pulm: nonlabored breathing  Abd: s/nt/nd  Neuro: AAOx3  Ext: extremities perfused    Focused exam of the: abdomen   Cgzeg-qe-rgx incisions  with sylke. There is purulent drainage and odor from the umbilicus and vertical incision from the umbilicus down to the T-point. There is yellow and grey slough present in the wound bed and around the umbilicus.          Assessment/Plan     Alisha Springer is a 59 y.o. female who presents for POV. They are s/p pgrxa-[id number] panniculectomy, ventral hernia repair, scarospinous ligament suspension for cystocele on 5/12/2025 with Dr. Allison and Dr. Thompson.     Wond culture performed today. She is currently on clindamycin. She was instructed to continue taking antibiotics, pending cultures if abx need to be changed per sensitivities. She should continue with acetic acid damp to dry packing changes BID. We will follow her weekly for wound checks.     Plan:   Continue antibiotics   Acetic acid and dry gauze packing  BID  Continue hot showers  Will follow cultures and adjust antibiotic regimen as needed  Follow up in 1 week for wound check   For concerns please contact our office or after hours patient line of symptoms develop or worsen    I spent 20 minutes with this patient. Greater than 50% of this time was spent in the counselling and/or coordination of care of this patient.  This note was created using voice recognition software and was not corrected for typographical or grammatical errors.    Signature: Yumi Bowles PA-C

## 2025-05-24 ENCOUNTER — HOSPITAL ENCOUNTER (INPATIENT)
Facility: HOSPITAL | Age: 60
End: 2025-05-24
Attending: EMERGENCY MEDICINE
Payer: COMMERCIAL

## 2025-05-24 DIAGNOSIS — Z98.890 S/P ABDOMINOPLASTY: ICD-10-CM

## 2025-05-24 DIAGNOSIS — T14.8XXA WOUND INFECTION: ICD-10-CM

## 2025-05-24 DIAGNOSIS — T81.30XA WOUND DEHISCENCE: Primary | ICD-10-CM

## 2025-05-24 DIAGNOSIS — L03.311 CELLULITIS OF ABDOMINAL WALL: ICD-10-CM

## 2025-05-24 DIAGNOSIS — L08.9 WOUND INFECTION: ICD-10-CM

## 2025-05-24 PROCEDURE — 96375 TX/PRO/DX INJ NEW DRUG ADDON: CPT

## 2025-05-24 PROCEDURE — 2500000004 HC RX 250 GENERAL PHARMACY W/ HCPCS (ALT 636 FOR OP/ED): Mod: JZ

## 2025-05-24 PROCEDURE — 1210000001 HC SEMI-PRIVATE ROOM DAILY

## 2025-05-24 PROCEDURE — 99285 EMERGENCY DEPT VISIT HI MDM: CPT | Performed by: EMERGENCY MEDICINE

## 2025-05-24 PROCEDURE — 96374 THER/PROPH/DIAG INJ IV PUSH: CPT

## 2025-05-24 PROCEDURE — 11042 DBRDMT SUBQ TIS 1ST 20SQCM/<: CPT

## 2025-05-24 PROCEDURE — 99285 EMERGENCY DEPT VISIT HI MDM: CPT | Mod: 25 | Performed by: EMERGENCY MEDICINE

## 2025-05-24 RX ORDER — ONDANSETRON HYDROCHLORIDE 2 MG/ML
4 INJECTION, SOLUTION INTRAVENOUS ONCE
Status: COMPLETED | OUTPATIENT
Start: 2025-05-24 | End: 2025-05-24

## 2025-05-24 RX ORDER — ACETAMINOPHEN 325 MG/1
650 TABLET ORAL EVERY 4 HOURS PRN
Status: DISCONTINUED | OUTPATIENT
Start: 2025-05-24 | End: 2025-05-25

## 2025-05-24 RX ORDER — MORPHINE SULFATE 4 MG/ML
4 INJECTION INTRAVENOUS ONCE
Status: COMPLETED | OUTPATIENT
Start: 2025-05-24 | End: 2025-05-24

## 2025-05-24 RX ORDER — DOCUSATE SODIUM 100 MG/1
100 CAPSULE, LIQUID FILLED ORAL 2 TIMES DAILY
Status: DISCONTINUED | OUTPATIENT
Start: 2025-05-24 | End: 2025-05-25

## 2025-05-24 RX ORDER — ONDANSETRON HYDROCHLORIDE 2 MG/ML
4 INJECTION, SOLUTION INTRAVENOUS EVERY 8 HOURS PRN
Status: DISCONTINUED | OUTPATIENT
Start: 2025-05-24 | End: 2025-05-31

## 2025-05-24 RX ORDER — ENOXAPARIN SODIUM 100 MG/ML
40 INJECTION SUBCUTANEOUS EVERY 24 HOURS
Status: DISCONTINUED | OUTPATIENT
Start: 2025-05-24 | End: 2025-06-06 | Stop reason: HOSPADM

## 2025-05-24 RX ADMIN — MORPHINE SULFATE 4 MG: 4 INJECTION, SOLUTION INTRAMUSCULAR; INTRAVENOUS at 22:27

## 2025-05-24 RX ADMIN — ENOXAPARIN SODIUM 40 MG: 100 INJECTION SUBCUTANEOUS at 23:00

## 2025-05-24 RX ADMIN — ONDANSETRON 4 MG: 2 INJECTION INTRAMUSCULAR; INTRAVENOUS at 22:26

## 2025-05-24 ASSESSMENT — PAIN SCALES - GENERAL
PAINLEVEL_OUTOF10: 7
PAINLEVEL_OUTOF10: 7

## 2025-05-24 ASSESSMENT — PAIN - FUNCTIONAL ASSESSMENT: PAIN_FUNCTIONAL_ASSESSMENT: 0-10

## 2025-05-25 VITALS
TEMPERATURE: 97.9 F | HEIGHT: 63 IN | WEIGHT: 160.5 LBS | SYSTOLIC BLOOD PRESSURE: 99 MMHG | OXYGEN SATURATION: 100 % | HEART RATE: 72 BPM | DIASTOLIC BLOOD PRESSURE: 64 MMHG | BODY MASS INDEX: 28.44 KG/M2 | RESPIRATION RATE: 16 BRPM

## 2025-05-25 PROBLEM — R52 ACUTE PAIN: Status: ACTIVE | Noted: 2025-05-25

## 2025-05-25 PROBLEM — D72.829 LEUKOCYTOSIS: Status: ACTIVE | Noted: 2025-05-25

## 2025-05-25 PROBLEM — T14.8XXA WOUND INFECTION: Status: ACTIVE | Noted: 2025-05-25

## 2025-05-25 PROBLEM — L08.9 WOUND INFECTION: Status: ACTIVE | Noted: 2025-05-25

## 2025-05-25 LAB
ANION GAP SERPL CALC-SCNC: 10 MMOL/L (ref 10–20)
BUN SERPL-MCNC: 16 MG/DL (ref 6–23)
CALCIUM SERPL-MCNC: 8.6 MG/DL (ref 8.6–10.6)
CHLORIDE SERPL-SCNC: 106 MMOL/L (ref 98–107)
CO2 SERPL-SCNC: 26 MMOL/L (ref 21–32)
CREAT SERPL-MCNC: 0.37 MG/DL (ref 0.5–1.05)
EGFRCR SERPLBLD CKD-EPI 2021: >90 ML/MIN/1.73M*2
ERYTHROCYTE [DISTWIDTH] IN BLOOD BY AUTOMATED COUNT: 13 % (ref 11.5–14.5)
GLUCOSE SERPL-MCNC: 109 MG/DL (ref 74–99)
GRAM STN SPEC: ABNORMAL
GRAM STN SPEC: ABNORMAL
HCT VFR BLD AUTO: 33.1 % (ref 36–46)
HGB BLD-MCNC: 10.7 G/DL (ref 12–16)
MCH RBC QN AUTO: 30.1 PG (ref 26–34)
MCHC RBC AUTO-ENTMCNC: 32.3 G/DL (ref 32–36)
MCV RBC AUTO: 93 FL (ref 80–100)
NRBC BLD-RTO: 0 /100 WBCS (ref 0–0)
PLATELET # BLD AUTO: 335 X10*3/UL (ref 150–450)
POTASSIUM SERPL-SCNC: 4.2 MMOL/L (ref 3.5–5.3)
RBC # BLD AUTO: 3.56 X10*6/UL (ref 4–5.2)
SODIUM SERPL-SCNC: 138 MMOL/L (ref 136–145)
VANCOMYCIN SERPL-MCNC: 5.1 UG/ML (ref 5–20)
WBC # BLD AUTO: 9.9 X10*3/UL (ref 4.4–11.3)

## 2025-05-25 PROCEDURE — 97597 DBRDMT OPN WND 1ST 20 CM/<: CPT

## 2025-05-25 PROCEDURE — 0JBL0ZZ EXCISION OF RIGHT UPPER LEG SUBCUTANEOUS TISSUE AND FASCIA, OPEN APPROACH: ICD-10-PCS

## 2025-05-25 PROCEDURE — 36415 COLL VENOUS BLD VENIPUNCTURE: CPT

## 2025-05-25 PROCEDURE — 87077 CULTURE AEROBIC IDENTIFY: CPT

## 2025-05-25 PROCEDURE — 2500000001 HC RX 250 WO HCPCS SELF ADMINISTERED DRUGS (ALT 637 FOR MEDICARE OP)

## 2025-05-25 PROCEDURE — 1170000001 HC PRIVATE ONCOLOGY ROOM DAILY

## 2025-05-25 PROCEDURE — 82374 ASSAY BLOOD CARBON DIOXIDE: CPT

## 2025-05-25 PROCEDURE — 99222 1ST HOSP IP/OBS MODERATE 55: CPT

## 2025-05-25 PROCEDURE — 2500000005 HC RX 250 GENERAL PHARMACY W/O HCPCS

## 2025-05-25 PROCEDURE — 97605 NEG PRS WND THER DME<=50SQCM: CPT

## 2025-05-25 PROCEDURE — 80202 ASSAY OF VANCOMYCIN: CPT

## 2025-05-25 PROCEDURE — 2500000004 HC RX 250 GENERAL PHARMACY W/ HCPCS (ALT 636 FOR OP/ED): Mod: JZ | Performed by: PHYSICIAN ASSISTANT

## 2025-05-25 PROCEDURE — 85027 COMPLETE CBC AUTOMATED: CPT

## 2025-05-25 PROCEDURE — 2500000004 HC RX 250 GENERAL PHARMACY W/ HCPCS (ALT 636 FOR OP/ED): Mod: JZ

## 2025-05-25 RX ORDER — MONTELUKAST SODIUM 10 MG/1
10 TABLET ORAL EVERY MORNING
Status: DISCONTINUED | OUTPATIENT
Start: 2025-05-25 | End: 2025-06-06 | Stop reason: HOSPADM

## 2025-05-25 RX ORDER — HYDROCODONE BITARTRATE AND ACETAMINOPHEN 5; 325 MG/1; MG/1
1 TABLET ORAL EVERY 6 HOURS PRN
Status: DISCONTINUED | OUTPATIENT
Start: 2025-05-25 | End: 2025-05-31

## 2025-05-25 RX ORDER — KETOROLAC TROMETHAMINE 30 MG/ML
30 INJECTION, SOLUTION INTRAMUSCULAR; INTRAVENOUS EVERY 6 HOURS SCHEDULED
Status: COMPLETED | OUTPATIENT
Start: 2025-05-25 | End: 2025-05-28

## 2025-05-25 RX ORDER — FLUTICASONE FUROATE AND VILANTEROL 100; 25 UG/1; UG/1
1 POWDER RESPIRATORY (INHALATION)
Status: DISCONTINUED | OUTPATIENT
Start: 2025-05-25 | End: 2025-06-06 | Stop reason: HOSPADM

## 2025-05-25 RX ORDER — CIPROFLOXACIN 2 MG/ML
400 INJECTION, SOLUTION INTRAVENOUS EVERY 12 HOURS
Status: DISCONTINUED | OUTPATIENT
Start: 2025-05-25 | End: 2025-05-28

## 2025-05-25 RX ORDER — DIPHENHYDRAMINE HYDROCHLORIDE 50 MG/ML
25 INJECTION, SOLUTION INTRAMUSCULAR; INTRAVENOUS EVERY 12 HOURS PRN
Status: DISCONTINUED | OUTPATIENT
Start: 2025-05-25 | End: 2025-06-06 | Stop reason: HOSPADM

## 2025-05-25 RX ORDER — METRONIDAZOLE 500 MG/100ML
500 INJECTION, SOLUTION INTRAVENOUS EVERY 8 HOURS
Status: DISCONTINUED | OUTPATIENT
Start: 2025-05-25 | End: 2025-05-28

## 2025-05-25 RX ORDER — VANCOMYCIN HYDROCHLORIDE 1 G/20ML
INJECTION, POWDER, LYOPHILIZED, FOR SOLUTION INTRAVENOUS DAILY PRN
Status: DISCONTINUED | OUTPATIENT
Start: 2025-05-25 | End: 2025-05-28 | Stop reason: ALTCHOICE

## 2025-05-25 RX ORDER — CLOBETASOL PROPIONATE 0.5 MG/G
CREAM TOPICAL
COMMUNITY
Start: 2025-04-28 | End: 2025-06-08

## 2025-05-25 RX ORDER — VANCOMYCIN HYDROCHLORIDE 1 G/200ML
1000 INJECTION, SOLUTION INTRAVENOUS EVERY 12 HOURS
Status: DISCONTINUED | OUTPATIENT
Start: 2025-05-25 | End: 2025-05-25

## 2025-05-25 RX ORDER — CYCLOBENZAPRINE HCL 10 MG
5 TABLET ORAL 3 TIMES DAILY PRN
Status: DISCONTINUED | OUTPATIENT
Start: 2025-05-25 | End: 2025-05-31

## 2025-05-25 RX ADMIN — CYCLOBENZAPRINE HYDROCHLORIDE 5 MG: 10 TABLET, FILM COATED ORAL at 11:49

## 2025-05-25 RX ADMIN — CIPROFLOXACIN 400 MG: 400 INJECTION, SOLUTION INTRAVENOUS at 16:07

## 2025-05-25 RX ADMIN — METRONIDAZOLE 500 MG: 500 INJECTION, SOLUTION INTRAVENOUS at 06:01

## 2025-05-25 RX ADMIN — HYDROCODONE BITARTRATE AND ACETAMINOPHEN 1 TABLET: 5; 325 TABLET ORAL at 08:27

## 2025-05-25 RX ADMIN — HYDROCODONE BITARTRATE AND ACETAMINOPHEN 1 TABLET: 5; 325 TABLET ORAL at 01:47

## 2025-05-25 RX ADMIN — CIPROFLOXACIN 400 MG: 400 INJECTION, SOLUTION INTRAVENOUS at 04:18

## 2025-05-25 RX ADMIN — VANCOMYCIN HYDROCHLORIDE 1000 MG: 1 INJECTION, SOLUTION INTRAVENOUS at 08:12

## 2025-05-25 RX ADMIN — KETOROLAC TROMETHAMINE 30 MG: 30 INJECTION, SOLUTION INTRAMUSCULAR; INTRAVENOUS at 12:18

## 2025-05-25 RX ADMIN — FLUTICASONE FUROATE AND VILANTEROL TRIFENATATE 1 PUFF: 100; 25 POWDER RESPIRATORY (INHALATION) at 08:12

## 2025-05-25 RX ADMIN — CYCLOBENZAPRINE HYDROCHLORIDE 5 MG: 10 TABLET, FILM COATED ORAL at 04:35

## 2025-05-25 RX ADMIN — METRONIDAZOLE 500 MG: 500 INJECTION, SOLUTION INTRAVENOUS at 14:18

## 2025-05-25 RX ADMIN — VANCOMYCIN HYDROCHLORIDE 1500 MG: 1.5 INJECTION, POWDER, LYOPHILIZED, FOR SOLUTION INTRAVENOUS at 20:25

## 2025-05-25 RX ADMIN — KETOROLAC TROMETHAMINE 30 MG: 30 INJECTION, SOLUTION INTRAMUSCULAR; INTRAVENOUS at 17:20

## 2025-05-25 RX ADMIN — MONTELUKAST 10 MG: 10 TABLET, FILM COATED ORAL at 08:12

## 2025-05-25 RX ADMIN — DAKIN'S SOLUTION 0.125% (QUARTER STRENGTH): 0.12 SOLUTION at 08:12

## 2025-05-25 RX ADMIN — HYDROCODONE BITARTRATE AND ACETAMINOPHEN 1 TABLET: 5; 325 TABLET ORAL at 16:17

## 2025-05-25 SDOH — SOCIAL STABILITY: SOCIAL INSECURITY: ARE THERE ANY APPARENT SIGNS OF INJURIES/BEHAVIORS THAT COULD BE RELATED TO ABUSE/NEGLECT?: NO

## 2025-05-25 SDOH — ECONOMIC STABILITY: HOUSING INSECURITY: IN THE LAST 12 MONTHS, WAS THERE A TIME WHEN YOU WERE NOT ABLE TO PAY THE MORTGAGE OR RENT ON TIME?: NO

## 2025-05-25 SDOH — ECONOMIC STABILITY: HOUSING INSECURITY: AT ANY TIME IN THE PAST 12 MONTHS, WERE YOU HOMELESS OR LIVING IN A SHELTER (INCLUDING NOW)?: NO

## 2025-05-25 SDOH — SOCIAL STABILITY: SOCIAL INSECURITY: WITHIN THE LAST YEAR, HAVE YOU BEEN HUMILIATED OR EMOTIONALLY ABUSED IN OTHER WAYS BY YOUR PARTNER OR EX-PARTNER?: NO

## 2025-05-25 SDOH — ECONOMIC STABILITY: FOOD INSECURITY: WITHIN THE PAST 12 MONTHS, THE FOOD YOU BOUGHT JUST DIDN'T LAST AND YOU DIDN'T HAVE MONEY TO GET MORE.: NEVER TRUE

## 2025-05-25 SDOH — ECONOMIC STABILITY: INCOME INSECURITY: IN THE PAST 12 MONTHS HAS THE ELECTRIC, GAS, OIL, OR WATER COMPANY THREATENED TO SHUT OFF SERVICES IN YOUR HOME?: NO

## 2025-05-25 SDOH — ECONOMIC STABILITY: TRANSPORTATION INSECURITY: IN THE PAST 12 MONTHS, HAS LACK OF TRANSPORTATION KEPT YOU FROM MEDICAL APPOINTMENTS OR FROM GETTING MEDICATIONS?: NO

## 2025-05-25 SDOH — SOCIAL STABILITY: SOCIAL INSECURITY: WITHIN THE LAST YEAR, HAVE YOU BEEN AFRAID OF YOUR PARTNER OR EX-PARTNER?: NO

## 2025-05-25 SDOH — ECONOMIC STABILITY: FOOD INSECURITY: HOW HARD IS IT FOR YOU TO PAY FOR THE VERY BASICS LIKE FOOD, HOUSING, MEDICAL CARE, AND HEATING?: NOT HARD AT ALL

## 2025-05-25 SDOH — ECONOMIC STABILITY: FOOD INSECURITY: WITHIN THE PAST 12 MONTHS, YOU WORRIED THAT YOUR FOOD WOULD RUN OUT BEFORE YOU GOT THE MONEY TO BUY MORE.: NEVER TRUE

## 2025-05-25 SDOH — ECONOMIC STABILITY: HOUSING INSECURITY: IN THE PAST 12 MONTHS, HOW MANY TIMES HAVE YOU MOVED WHERE YOU WERE LIVING?: 0

## 2025-05-25 SDOH — SOCIAL STABILITY: SOCIAL INSECURITY: HAVE YOU HAD THOUGHTS OF HARMING ANYONE ELSE?: NO

## 2025-05-25 SDOH — SOCIAL STABILITY: SOCIAL INSECURITY: DOES ANYONE TRY TO KEEP YOU FROM HAVING/CONTACTING OTHER FRIENDS OR DOING THINGS OUTSIDE YOUR HOME?: NO

## 2025-05-25 SDOH — SOCIAL STABILITY: SOCIAL INSECURITY: HAS ANYONE EVER THREATENED TO HURT YOUR FAMILY OR YOUR PETS?: NO

## 2025-05-25 SDOH — SOCIAL STABILITY: SOCIAL INSECURITY: WERE YOU ABLE TO COMPLETE ALL THE BEHAVIORAL HEALTH SCREENINGS?: YES

## 2025-05-25 SDOH — SOCIAL STABILITY: SOCIAL INSECURITY: DO YOU FEEL ANYONE HAS EXPLOITED OR TAKEN ADVANTAGE OF YOU FINANCIALLY OR OF YOUR PERSONAL PROPERTY?: NO

## 2025-05-25 SDOH — SOCIAL STABILITY: SOCIAL INSECURITY: DO YOU FEEL UNSAFE GOING BACK TO THE PLACE WHERE YOU ARE LIVING?: NO

## 2025-05-25 SDOH — SOCIAL STABILITY: SOCIAL INSECURITY: ABUSE: ADULT

## 2025-05-25 SDOH — SOCIAL STABILITY: SOCIAL INSECURITY: ARE YOU OR HAVE YOU BEEN THREATENED OR ABUSED PHYSICALLY, EMOTIONALLY, OR SEXUALLY BY ANYONE?: NO

## 2025-05-25 ASSESSMENT — PAIN DESCRIPTION - LOCATION
LOCATION: ABDOMEN

## 2025-05-25 ASSESSMENT — PAIN - FUNCTIONAL ASSESSMENT
PAIN_FUNCTIONAL_ASSESSMENT: 0-10

## 2025-05-25 ASSESSMENT — LIFESTYLE VARIABLES
AUDIT-C TOTAL SCORE: 0
HOW OFTEN DO YOU HAVE 6 OR MORE DRINKS ON ONE OCCASION: NEVER
SKIP TO QUESTIONS 9-10: 1
AUDIT-C TOTAL SCORE: 0
HOW MANY STANDARD DRINKS CONTAINING ALCOHOL DO YOU HAVE ON A TYPICAL DAY: PATIENT DOES NOT DRINK
HOW OFTEN DO YOU HAVE A DRINK CONTAINING ALCOHOL: NEVER

## 2025-05-25 ASSESSMENT — COGNITIVE AND FUNCTIONAL STATUS - GENERAL
PATIENT BASELINE BEDBOUND: NO
DAILY ACTIVITIY SCORE: 23
DAILY ACTIVITIY SCORE: 20
DRESSING REGULAR LOWER BODY CLOTHING: A LITTLE
CLIMB 3 TO 5 STEPS WITH RAILING: A LITTLE
MOBILITY SCORE: 19
MOVING TO AND FROM BED TO CHAIR: A LITTLE
HELP NEEDED FOR BATHING: A LITTLE
WALKING IN HOSPITAL ROOM: A LITTLE
TOILETING: A LITTLE
MOBILITY SCORE: 23
DRESSING REGULAR UPPER BODY CLOTHING: A LITTLE
STANDING UP FROM CHAIR USING ARMS: A LITTLE
DRESSING REGULAR LOWER BODY CLOTHING: A LITTLE
CLIMB 3 TO 5 STEPS WITH RAILING: A LITTLE
TURNING FROM BACK TO SIDE WHILE IN FLAT BAD: A LITTLE

## 2025-05-25 ASSESSMENT — ACTIVITIES OF DAILY LIVING (ADL)
TOILETING: INDEPENDENT
LACK_OF_TRANSPORTATION: NO
DRESSING YOURSELF: INDEPENDENT
PATIENT'S MEMORY ADEQUATE TO SAFELY COMPLETE DAILY ACTIVITIES?: YES
JUDGMENT_ADEQUATE_SAFELY_COMPLETE_DAILY_ACTIVITIES: YES
ASSISTIVE_DEVICE: EYEGLASSES
ADEQUATE_TO_COMPLETE_ADL: YES
FEEDING YOURSELF: INDEPENDENT
HEARING - LEFT EAR: FUNCTIONAL
HEARING - RIGHT EAR: FUNCTIONAL
WALKS IN HOME: INDEPENDENT
BATHING: INDEPENDENT
GROOMING: INDEPENDENT

## 2025-05-25 ASSESSMENT — PATIENT HEALTH QUESTIONNAIRE - PHQ9
SUM OF ALL RESPONSES TO PHQ9 QUESTIONS 1 & 2: 1
1. LITTLE INTEREST OR PLEASURE IN DOING THINGS: NOT AT ALL
2. FEELING DOWN, DEPRESSED OR HOPELESS: SEVERAL DAYS

## 2025-05-25 ASSESSMENT — PAIN SCALES - GENERAL
PAINLEVEL_OUTOF10: 8
PAINLEVEL_OUTOF10: 8
PAINLEVEL_OUTOF10: 5 - MODERATE PAIN
PAINLEVEL_OUTOF10: 5 - MODERATE PAIN
PAINLEVEL_OUTOF10: 4
PAINLEVEL_OUTOF10: 2
PAINLEVEL_OUTOF10: 7
PAINLEVEL_OUTOF10: 0 - NO PAIN
PAINLEVEL_OUTOF10: 4
PAINLEVEL_OUTOF10: 4

## 2025-05-25 ASSESSMENT — PAIN DESCRIPTION - ORIENTATION: ORIENTATION: LOWER;MID

## 2025-05-25 ASSESSMENT — PAIN DESCRIPTION - DESCRIPTORS: DESCRIPTORS: BURNING;STABBING

## 2025-05-25 NOTE — ASSESSMENT & PLAN NOTE
- Admit inpatient to plastics for IV Abx and wound care with irrigating vac       . IV Vanco dose per pharmacy, Ciprofloxacin and Flagyl  - 5/24: Wound culture taken at bedside and pending from OSH, follow up results, tissue culture taken at bedside post debridement, follow up results  - Consider ID consult based on cultures  - Monitor labs  - Vital signs Q4, ambulate without assistance  F: none, encourage PO intake  E: monitor and replete PRN  N: regular diet  GI: Colace BID   Dvt ppx:   - lovenox 40mg daily  - SCD's  - encourage ambulation  - S/P bedside debridement of lower abdominal wound 05/24 (See separate procedural note for details)  - Maintain irrigating wound vac to abdominal surgical site per plastic surgery         ·  First/Next bedside vac dressing change anticipated 05/27        ·  Settings: -125mmHg, medium, continuous suction       ·  Maintain irrigating wound vac with Dakins 0.125% installation 15cc, every 2 hours,      dwell time 10 minutes.        ·  Nursing to monitor and record vac canister output at least q8h (please record 0 for no output)       ·  For any issues or concerns with vac, please notify plastic surgery team at [phone] or pager #[phone]

## 2025-05-25 NOTE — ED TRIAGE NOTES
Transfer from [facility], for surgical consult after pt had an abdominoplasty and surgery to correct a bladder and rectal prolapse at [facility]. Pt has developed an infection of the wound on their abdomin. Pt states they have low grade fevers at night, N/V, and pain. Cultures drawn and pt received a round of Vanco prior to [place] [facility]

## 2025-05-25 NOTE — ED PROVIDER NOTES
Emergency Department Provider Note        History of Present Illness     History provided by: Patient  Limitations to History: None  External Records Reviewed with Brief Summary: Reviewed patient's recent discharge summary from positive surgery on 5/13/2025    HPI:  Alisha Springer is a 59 y.o. female who is s/p [id number]-[id number] panniculectomy, ventral hernia repair, sacrospinous ligament suspension for cystocele on 05/12/2025 with Dr. Allison and Dr. Thompson who is presenting to the ED as a transfer from [facility] for admission under plastic surgery for surgical wound infection at her abdomen. At [facility], CT abdomen pelvis showed soft tissue stranding in the abdominal wall with multiple air bubbles, and large fluid collection along the right lateral aspect of the pelvis.  On arrival to ED, patient complains of extreme nausea without vomiting, significant pain in her abdomen, low-grade fevers which occur primarily at night, chills.  Patient been taking Flexeril for pain and 1 Norco at night with symptomatic relief.    Physical Exam   Triage vitals:  T 36.8 °C (98.2 °F)  HR 80  /72  RR 15  O2 97 % None (Room air)    General: Awake, alert, in no acute distress  Eyes: Gaze conjugate.  No scleral icterus or injection  HENT: Normo-cephalic, atraumatic. No stridor  CV: Regular rate, regular rhythm. Radial pulses 2+ bilaterally  Resp: Breathing non-labored, speaking in full sentences.  Clear to auscultation bilaterally  GI: Soft, non-distended. No rebound or guarding. wound dehiscence and purulent drainage and surrounding erythema.  Fluctuant fluid collection along the left lower quadrant of abdomen extending down onto the side of her thigh.  MSK/Extremities: No gross bony deformities. Moving all extremities  Skin: Warm. Appropriate color  Neuro: Alert. Oriented. Face symmetric. Speech is fluent.  Gross strength and sensation intact in b/l UE and LEs  Psych: Appropriate mood and affect      Medical Decision Making & ED  Course   Medical Decision Makin y.o. female  with a past medical history of asthma, GERD s/p fundoplication, depression, endometriosis, nephrolithiasis, rectocele, female cystocele who is s/p onsof-ln-eqp panniculectomy, ventral hernia repair, sacrospinous ligament suspension for cystocele on 2025 with Dr. Allison and Dr. Thompson who is presenting to the ED as a transfer from [facility] for admission under plastic surgery for surgical wound infection at her abdomen.  On arrival, patient was hemodynamically stable, patient afebrile.  Labs at [facility] showed mild leukocytosis with WBC 11.5, low-grade fevers of 99.9-100.1.  At [facility] blood cultures and wound cultures were also obtained.  Patient given vancomycin, Flagyl, and Rocephin. No further workup necessary at this time in our emergency department, will admit to plastic surgery who is expecting patient, will do bedside debridement, wound care with irrigation and placement of wound VAC.  ----     Social Determinants of Health which Significantly Impact Care: None identified     EKG Independent Interpretation: EKG not obtained    Independent Result Review and Interpretation: None obtained, obtained at outside facility, and reviewed myself.    Chronic conditions affecting the patient's care: As documented above in [facility]    The patient was discussed with the following consultants/services: Plastic surgery admission    Care Considerations: As documented above in [facility]    ED Course:  Diagnoses as of 25   Wound dehiscence     Disposition   As a result of their workup, the patient will require admission to the hospital.  The patient was informed of her diagnosis.  The patient was given the opportunity to ask questions and I answered them. The patient agreed to be admitted to the hospital.    Procedures   Procedures    Patient seen and discussed with ED attending physician.    Elio Ortega DO  Emergency Medicine     Elio Ortega DO  Resident  [phone]     Medicine     Elio Ortega,   Resident  05/25/25 1557       Elio Ortega,   Resident  06/04/25 1916    Emergency Medicine Attending Attestation:     Diagnoses as of 06/15/25 1124   Wound dehiscence   S/P abdominoplasty   Cellulitis of abdominal wall       The patient was seen by the resident/fellow.  I have personally performed a substantive portion of the encounter.  I have seen and examined the patient; agree with the workup, evaluation, MDM, management and diagnosis.  The care plan has been discussed with the resident; I have reviewed the resident’s note and agree with the documented findings.                         MD Radha Prater MD  06/15/25 1124

## 2025-05-25 NOTE — H&P
History Of Present Illness  Alisha Springer is a 59 y.o. female with a past medical history of asthma, GERD s/p fundoplication, depression, endometriosis, nephrolithiasis, rectocele, female cystocele who is s/p expea-qv-xoo panniculectomy, ventral hernia repair, sacrospinous ligament suspension for cystocele on 05/12/2025 with Dr. Allison and Dr. Thompson who is presenting to the ED as a transfer from [facility] for admission under plastic surgery for surgical wound infection at her abdomen. At [facility], CT abdomen pelvis showed soft tissue stranding in the abdominal wall with multiple air bubbles, and large fluid collection along the right lateral aspect of the pelvis. On arrival to the ED, patient is hemodynamically stable. She has complaints of extreme nausea without vomiting, significant pain at her abdomen, fever and chills since yesterday night. Reports overnight she had a low grade fever of 99.9 to 100.1 and leukocytosis (WBC 11.5) seen on labs yesterday. She has been taking Flexeril for pain and one Norco at night with relief of symptoms. Describes the pain as her abdomen is worse with movement to the R side and is about a 9/10, while laying down without movement it is a 5/10. ED took wound cultures at bedside with pending results. No other acute concerns at this time. Denies any night sweats, CP, SOB, palpitations, diarrhea, constipation, dysuria, hematuria, hematochezia, hematemesis, flank pain. Plastic surgery to admit for IV abx and wound care with irrigating wound vac.  Past Medical History  Medical History[1]    Surgical History  Surgical History[2]     Social History  She reports that she has never smoked. She has never been exposed to tobacco smoke. She has never used smokeless tobacco. She reports that she does not drink alcohol and does not use drugs.    Family History  Family History[3]     Allergies  Cephalexin, Erythromycin, Penicillins, Tetracyclines, Codeine, and Bupropion    Review of Systems  ROS:  "All 10 systems were reviewed and are unremarkable except for those mentioned in HPI.     Physical Exam  Constitutional: A&Ox3, calm and cooperative, NAD.  Eyes: PERRL, EOMI  ENMT: Moist mucous membranes, no apparent injuries or lesions.  Head/Neck: NC/AT.   Cardiovascular: Normal rate and regular rhythm. 2+ equal pulses of the distal extremities.  Respiratory/Thorax: CTAB, regular respirations on RA. Good symmetric chest expansion.   Gastrointestinal: Abdomen soft, appropriately tender, no peritoneal signs, +BS x 4. Malodorous lower abdominal wound dehiscence with necrotic tissue around umbilicus stump with fat necrosis.   Genitourinary: voiding independently   Extremities: No peripheral edema. Moving all 4 extremities actively.   Neurological: A&Ox3.   Psychological: Appropriate mood and behavior.      Pre-debridement:  Necrotic tissue around umbilicus stump, fat necrosis noted.      Post-debridement:     Last Recorded Vitals  Blood pressure 106/70, pulse 81, temperature 36.8 °C (98.2 °F), temperature source Temporal, resp. rate 16, height 1.6 m (5' 3\"), weight 61.2 kg (135 lb), SpO2 96%.    Relevant Results  Scheduled medications  Scheduled Medications[4]  Continuous medications  Continuous Medications[5]  PRN medications  PRN Medications[6]      Results for orders placed or performed during the hospital encounter of 05/24/25 (from the past 24 hours)   CBC   Result Value Ref Range    WBC 9.9 4.4 - 11.3 x10*3/uL    nRBC 0.0 0.0 - 0.0 /100 WBCs    RBC 3.56 (L) 4.00 - 5.20 x10*6/uL    Hemoglobin 10.7 (L) 12.0 - 16.0 g/dL    Hematocrit 33.1 (L) 36.0 - 46.0 %    MCV 93 80 - 100 fL    MCH 30.1 26.0 - 34.0 pg    MCHC 32.3 32.0 - 36.0 g/dL    RDW 13.0 11.5 - 14.5 %    Platelets 335 150 - 450 x10*3/uL    CT abdomen pelvis w IV contrast  Result Date: 5/24/2025  * * *Final Report* * * DATE OF EXAM: May 24 2025  4:04PM   [facility]   0530  -  CT ABD/PEL W IVCON  / ACCESSION #  [id number] PROCEDURE REASON: Abdominal abscess/infection " suspected      * * * * Physician Interpretation * * * *  EXAMINATION:  CT ABDOMEN AND PELVIS WITH IV CONTRAST CLINICAL HISTORY: Lower abdominal pain.  A recent hernia and bladder prolapse surgery. TECHNIQUE: CT of the abdomen and pelvis was performed using standard technique, scanning from just above the dome of the diaphragm to the symphysis pubis. MQ:  CTAP_3 Contrast: IV:  100 ml of Omnipaque 350 CT Radiation dose: Integrated Dose-length product (DLP) for this visit =   628 mGy*cm. CT Dose Reduction Employed: Automated exposure control(AEC) and iterative recon COMPARISON: CT abdomen pelvis on 01/18/2025 RESULT: Liver: A new low-attenuation lesions or cysts visualized measuring up to 7 mm, unchanged. Biliary: No bile duct dilation.  No CT evidence of gallbladder stones. Spleen: No mass. No splenomegaly. Pancreas: No mass or duct dilation. Adrenals: No mass. Kidneys: There is a questionable parapelvic cyst in the right kidney.  No hydroureteronephrosis. GI tract: No dilation or wall thickening. The appendix is not identified.  No diverticulitis.  Moderate amount of stool and gas noted in the large bowel loops. Lymph nodes: No abdominal or pelvic lymphadenopathy. Mesentery/Peritoneum: No ascites or mass or free abdominal air. Retroperitoneum: No mass.   Vasculature: The celiac artery, SMA, YOVANI, portal veins and hepatic veins are patent.  No AAA. Pelvis: No mass, ascites or fluid collection. The uterus is surgically absent. Bones/Soft Tissues: Soft tissue stranding seen in the anterior aspect of the abdominal wall, with multiple air bubbles.  Also noted is a 3.6 x 8.3 cm fluid collection along the right lateral aspect of the pelvis, series 2 image 71.  Hernia mesh visualized.  The spine shows mild degenerative changes. Lower thorax: No pleural effusions.  Dependent atelectasis seen in the bilateral lower lobes.  Patient is status post fundoplication. Localizer images: No additional findings.    IMPRESSION: Right  renal cyst not excluded. Appendix not identified. Moderate stool burden. Soft tissue stranding in the anterior abdominal wall with air bubbles. Large fluid collection along the right lateral aspect of the pelvis.   : VISH   Transcribe Date/Time: May 24 2025  6:27P Dictated by : JORDI BHATIA MD This examination was interpreted and the report reviewed and electronically signed by: JORDI BHATIA MD on May 24 2025  6:39PM  EST     Assessment & Plan  Wound dehiscence  S/P lsdqv-[id number] panniculectomy and ventral hernia repair with Dr. Allison 05/12/2025  Wound infection  - Admit inpatient to plastics for IV Abx and wound care with irrigating vac       . IV Vanco dose per pharmacy, Ciprofloxacin and Flagyl  - 5/24: Wound culture taken at bedside and pending from OSH, follow up results, tissue culture taken at bedside post debridement, follow up results  - Consider ID consult based on cultures  - Monitor labs  - Vital signs Q4, ambulate without assistance  F: none, encourage PO intake  E: monitor and replete PRN  N: regular diet  GI: Colace BID   Dvt ppx:   - lovenox 40mg daily  - SCD's  - encourage ambulation  - S/P bedside debridement of lower abdominal wound 05/24 (See separate procedural note for details)  - Maintain irrigating wound vac to abdominal surgical site per plastic surgery         ·  First/Next bedside vac dressing change anticipated 05/27        ·  Settings: -125mmHg, medium, continuous suction       ·  Maintain irrigating wound vac with Dakins 0.125% installation 15cc, every 2 hours,      dwell time 10 minutes.        ·  Nursing to monitor and record vac canister output at least q8h (please record 0 for no output)       ·  For any issues or concerns with vac, please notify plastic surgery team at [phone] or pager #[phone]   Acute pain  - PRN pain management    Asthma  - Continue home Breo Ellipta and Singulair  - Stable, consult RT as needed    Disposition: Continue care on RNF. Will remain inpatient  for IV Abx and local wound care.    Patient and plan discussed with Dr. Allison.    Joyce Joya, PA-C  Plastic and Reconstructive Surgery   [place]  Pager #[phone]  Team phone: [phone]          [1]   Past Medical History:  Diagnosis Date    Asthma     Chronic pruritic rash in adult     B/L hands    Depression     Endometriosis     Female cystocele     GERD (gastroesophageal reflux disease)     Nephrolithiasis     Personal history of other diseases of the digestive system 11/15/2016    History of intestinal obstruction    Personal history of other diseases of the female genital tract 11/15/2016    History of endometriosis    Personal history of urinary calculi 11/15/2016    History of renal calculi    Rectocele     Quevedo- syndrome (Multi)     Thyroid nodule     Unspecified ectopic pregnancy without intrauterine pregnancy ([facility]) 11/15/2016    Ectopic pregnancy   [2]   Past Surgical History:  Procedure Laterality Date    BLADDER SURGERY      ECTOPIC PREGNANCY SURGERY      HERNIA REPAIR  04/23/2018    Hernia Repair    HYSTERECTOMY  11/15/2016    Hysterectomy    KNEE SURGERY  11/15/2016    Knee Surgery    OTHER SURGICAL HISTORY  07/24/2018    Esophagogastric Fundoplasty Nissen Fundoplication Robotic-Assisted   [3]   Family History  Problem Relation Name Age of Onset    Breast cancer Mother Ami Go 64    Heart failure Mother Ami Go     Hypertension Mother Amisimon Go     Cancer Mother Amisimon Go     Heart failure Father Mckinley Go     Hypertension Father Mckinley Donavan     Breast cancer Sister Ana Beal 64    Diabetes Sister Ana Beal     Hypertension Sister Anatrevor Beal     Heart failure Brother Jay Go     Hypertension Brother Jay Go     Seizures Child      Breast cancer Mother's Sister Sister 62    Breast cancer Niece  44    Cancer Paternal Grandfather Mckinley Go     Cancer Mother's Sister Ivania Murillo     Cancer Mother's Sister Ivania Murillo      Cancer Mother's Sister Ivania Murillo     Cancer Mother's Sister Ivania Murillo     Cancer Mother's Sister Ivania Murillo    [4] ciprofloxacin, 400 mg, intravenous, q12h  enoxaparin, 40 mg, subcutaneous, q24h  fluticasone furoate-vilanteroL, 1 puff, inhalation, Daily  metroNIDAZOLE, 500 mg, intravenous, q8h  montelukast, 10 mg, oral, q AM  vancomycin, 1,000 mg, intravenous, q12h  [5]    [6] PRN medications: cyclobenzaprine, HYDROcodone-acetaminophen, ondansetron, vancomycin

## 2025-05-25 NOTE — PROGRESS NOTES
Vancomycin Dosing by Pharmacy- INITIAL    Alisha Springer is a 59 y.o. year old female who Pharmacy has been consulted for vancomycin dosing for surgical wound infection. Based on the patient's indication and renal status this patient will be dosed based on a goal AUC of 400-600.     Renal function is currently stable.    Visit Vitals  /70 (BP Location: Right arm, Patient Position: Lying)   Pulse 75   Temp 37.2 °C (99 °F) (Temporal)   Resp 14        Lab Results   Component Value Date    CREATININE 0.78 2025    CREATININE 0.57 2024    CREATININE 0.64 2018    CREATININE 0.92 2018        Patient weight is as follows:   Vitals:    25 0118   Weight: 66.9 kg (147 lb 6.4 oz)       Cultures:  No results found for the encounter in last 14 days.        No intake/output data recorded.  I/O during current shift:  No intake/output data recorded.    Temp (24hrs), Av °C (98.6 °F), Min:36.8 °C (98.2 °F), Max:37.2 °C (99 °F)         Assessment/Plan     Patient will not be given a loading dose.  Will initiate vancomycin maintenance, 1000 mg every 12 hours.    This dosing regimen is predicted by [email or web address] to result in the following pharmacokinetic parameters:    Regimen: 1000 mg IV every 12 hours.  Start time: 02:03 on 2025  Exposure target: AUC24 (range) 400-600 mg/L.hr   [id number]: 458 mg/L.hr  AUC24,ss: 581 mg/L.hr  Probability of AUC24 > 400: 86 %  Ctrough,ss: 18.4 mg/L  Probability of Ctrough,ss > 20: 42 %    Follow-up level will be ordered on  at 5a unless clinically indicated sooner.  Will continue to monitor renal function daily while on vancomycin and order serum creatinine at least every 48 hours if not already ordered.  Follow for continued vancomycin needs, clinical response, and signs/symptoms of toxicity.       Naun Cardoza, PharmD

## 2025-05-25 NOTE — ASSESSMENT & PLAN NOTE
- PRN pain management    Asthma  - Continue home Breo Ellipta and Singulair  - Stable, consult RT as needed    Disposition: Continue care on RNF. Will remain inpatient for IV Abx and local wound care.

## 2025-05-25 NOTE — PROGRESS NOTES
Pharmacy Medication History Review    Alisha Springer is a 59 y.o. female admitted for Wound dehiscence. Pharmacy reviewed the patient's [id number] medications and allergies for accuracy.    Medications ADDED:  Clobetasol 0.05% cream  Medications CHANGED:  N/A  Medications REMOVED:   Hibiclens external liquid 4%     The list below reflects the updated PTA list.   Prior to Admission Medications   Prescriptions Last Dose Informant   HYDROcodone-acetaminophen (Norco) 5-325 mg tablet     Sig: Take 1 tablet by mouth every 6 hours if needed for severe pain (7 - 10) for up to 7 days.   acetaminophen (Tylenol) 500 mg tablet  Self   Sig: Take 2 tablets (1,000 mg) by mouth every 6 hours if needed for mild pain (1 - 3).   ascorbic acid (Vitamin C) 500 mg tablet  Self   Sig: Take 1 tablet (500 mg) by mouth once daily.   chlorhexidine (Peridex) 0.12 % solution Not Taking Self   Sig: 15 milliliter(s) orally once a day for 2 doses 15 ml  the night before surgery and 15 ml morning of surgery - swish for 30 seconds -DO NOT SWALLOW, SPIT OUT   Patient not taking: Reported on 5/25/2025   cholecalciferol (Vitamin D-3) 25 MCG (1000 UT) tablet  Self   Sig: Take 1 tablet (1,000 Units) by mouth once daily.   clindamycin (Cleocin) 300 mg capsule Not Taking    Sig: Take 1 capsule (300 mg) by mouth 3 times a day for 10 days.   Patient not taking: Reported on 5/25/2025   clobetasol (Temovate) 0.05 % cream     Sig: Apply to affected area twice daily as needed. No longer than 5 days in a row per week   cyanocobalamin (Vitamin B-12) 1,000 mcg tablet  Self   Sig: Take 1 tablet (1,000 mcg) by mouth once daily.   cyclobenzaprine (Flexeril) 5 mg tablet     Sig: Take 1 tablet (5 mg) by mouth 3 times a day as needed for muscle spasms.   docusate sodium (Colace) 100 mg capsule Not Taking    Sig: Take 1 capsule (100 mg) by mouth 2 times a day.   Patient not taking: Reported on 5/25/2025   ibuprofen 600 mg tablet Not Taking Self   Sig: Take 1 tablet  (600 mg) by mouth every 6 hours if needed for mild pain (1 - 3).   Patient not taking: Reported on 5/25/2025   mometasone-formoterol (Dulera) 100-5 mcg/actuation inhaler  Self   Sig: Inhale 2 puffs 2 times a day. Rinse mouth with water after use to reduce aftertaste and incidence of candidiasis. Do not swallow.  No fill Hx   montelukast (Singulair) 10 mg tablet  Self   Sig: Take 1 tablet (10 mg) by mouth once daily in the morning.  Last fill 7/8/24 with 30 tablets for a 30 day supply   naproxen (Naprosyn) 500 mg tablet  Self   Sig: Take 1 tablet (500 mg) by mouth 2 times a day as needed for mild pain (1 - 3).   ondansetron (Zofran) 4 mg tablet     Sig: Take 1 tablet (4 mg) by mouth every 8 hours if needed for nausea or vomiting for up to 7 days.   phentermine (Adipex-P) 37.5 mg tablet  Self   Sig: Take 1 tablet (37.5 mg) by mouth once daily in the morning. Take before meals.   potassium gluconate 595 mg (99 mg) tablet  Self   Sig: Take 1 tablet by mouth once daily.   zinc gluconate 50 mg tablet  Self   Sig: Take 1 tablet by mouth once daily.      Facility-Administered Medications: None        The list below reflects the updated allergy list. Please review each documented allergy for additional clarification and justification.  Allergies  Reviewed by Doreen Haji on 5/25/2025        Severity Reactions Comments    Cephalexin High Anaphylaxis, Quevedo- syndrome     Erythromycin High Quevedo- syndrome     Penicillins High Anaphylaxis, Quevedo- syndrome     Tetracyclines High Anaphylaxis, Hives, Shortness of breath, Nausea/vomiting     Codeine Medium Diarrhea, GI Upset, Nausea/vomiting     Bupropion Low Itching             Patient declines M2B at discharge.     Sources:   [facility]  Pharmacy dispense history  Patient Interview Good historian  Chart Review  Care Everywhere     Additional Comments:  N/A      DOREEN HAJI  Pharmacy Technician  05/25/25     Secure Chat preferred   If no response call  "[phone] or Vocera \"Med Rec\"   "

## 2025-05-25 NOTE — PROCEDURES
Patient consented to the excisional debridement procedure.    She was positioned supine on the bed    The injected local anesthetic    With scalpel I excised nonviable eschar and subcutaneous tissues from the right thigh wound.  There is total less than 20 cm².  Patient tolerated the procedure well.  Clean pressure dressing applied.       Wound Vac application   The existing wound was measured: 7 x 2.5 x 3 cm  Wound debrided at bedside, fat necrosis debrided, tissue sample sent for culture  The black vac sponge was fashioned to fit the wound and secured with tape.   The sponge was attached to continuous suction at 125 mmHg. Irrigating wound vac setup with 15cc instillation every 2 hours with 10 minute dwell time  No leak present.   Pt tolerate procedure well.

## 2025-05-25 NOTE — PROGRESS NOTES
Vancomycin Dosing by Pharmacy- FOLLOW UP    Alisha Springer is a 59 y.o. year old female who Pharmacy has been consulted for vancomycin dosing for other surgical wound infection. Based on the patient's indication and renal status this patient is being dosed based on a goal AUC of 400-600.     Renal function is currently stable.    Current vancomycin dose: 1000 mg given every 12 hours    Estimated vancomycin AUC on current dose: 378 mg/L.hr     Visit Vitals  /71 (BP Location: Left arm, Patient Position: Lying)   Pulse 77   Temp 37.4 °C (99.3 °F) (Temporal)   Resp 16        Lab Results   Component Value Date    CREATININE 0.37 (L) 2025    CREATININE 0.78 2025    CREATININE 0.57 2024    CREATININE 0.64 2018    CREATININE 0.92 2018        Patient weight is as follows:   Vitals:    25 0433   Weight: 72.8 kg (160 lb 8 oz)       Cultures:  No results found for the encounter in last 14 days.       No intake/output data recorded.  I/O during current shift:  I/O this shift:  In: -   Out: 700 [Urine:700]    Temp (24hrs), Av.1 °C (98.7 °F), Min:36.7 °C (98 °F), Max:37.4 °C (99.3 °F)      Assessment/Plan    Below goal AUC. Orders placed for new vancomcyin regimen of 1500 mg every 12 hours to begin at 2000 on .     This dosing regimen is predicted by [employer] to result in the following pharmacokinetic parameters:  Loading dose: N/A  Regimen: 1500 mg IV every 12 hours.  Start time: 20:12 on 2025  Exposure target: AUC24 (range) 400-600 mg/L.hr   [id number]: 528 mg/L.hr  AUC24,ss: 565 mg/L.hr  Probability of AUC24 > 400: 95 %  Ctrough,ss: 15.9 mg/L  Probability of Ctrough,ss > 20: 24 %      The next level will be obtained on  at 0500 . May be obtained sooner if clinically indicated.   Will continue to monitor renal function daily while on vancomycin and order serum creatinine at least every 48 hours if not already ordered.  Follow for continued vancomycin needs, clinical  response, and signs/symptoms of toxicity.       Reynaldo Goins, PharmD

## 2025-05-26 LAB — VANCOMYCIN SERPL-MCNC: 18.5 UG/ML (ref 5–20)

## 2025-05-26 PROCEDURE — 2500000004 HC RX 250 GENERAL PHARMACY W/ HCPCS (ALT 636 FOR OP/ED): Mod: JZ | Performed by: PHYSICIAN ASSISTANT

## 2025-05-26 PROCEDURE — 36415 COLL VENOUS BLD VENIPUNCTURE: CPT

## 2025-05-26 PROCEDURE — 2500000005 HC RX 250 GENERAL PHARMACY W/O HCPCS

## 2025-05-26 PROCEDURE — 2500000004 HC RX 250 GENERAL PHARMACY W/ HCPCS (ALT 636 FOR OP/ED): Mod: JZ

## 2025-05-26 PROCEDURE — 80202 ASSAY OF VANCOMYCIN: CPT

## 2025-05-26 PROCEDURE — 2500000001 HC RX 250 WO HCPCS SELF ADMINISTERED DRUGS (ALT 637 FOR MEDICARE OP)

## 2025-05-26 PROCEDURE — 99231 SBSQ HOSP IP/OBS SF/LOW 25: CPT | Performed by: PHYSICIAN ASSISTANT

## 2025-05-26 PROCEDURE — 1170000001 HC PRIVATE ONCOLOGY ROOM DAILY

## 2025-05-26 RX ORDER — VANCOMYCIN HYDROCHLORIDE 1 G/200ML
1000 INJECTION, SOLUTION INTRAVENOUS EVERY 12 HOURS
Status: DISCONTINUED | OUTPATIENT
Start: 2025-05-26 | End: 2025-05-28

## 2025-05-26 RX ADMIN — VANCOMYCIN HYDROCHLORIDE 1000 MG: 1 INJECTION, SOLUTION INTRAVENOUS at 20:38

## 2025-05-26 RX ADMIN — KETOROLAC TROMETHAMINE 30 MG: 30 INJECTION, SOLUTION INTRAMUSCULAR; INTRAVENOUS at 00:57

## 2025-05-26 RX ADMIN — METRONIDAZOLE 500 MG: 500 INJECTION, SOLUTION INTRAVENOUS at 23:33

## 2025-05-26 RX ADMIN — KETOROLAC TROMETHAMINE 30 MG: 30 INJECTION, SOLUTION INTRAMUSCULAR; INTRAVENOUS at 07:06

## 2025-05-26 RX ADMIN — METRONIDAZOLE 500 MG: 500 INJECTION, SOLUTION INTRAVENOUS at 17:32

## 2025-05-26 RX ADMIN — HYDROCODONE BITARTRATE AND ACETAMINOPHEN 1 TABLET: 5; 325 TABLET ORAL at 00:56

## 2025-05-26 RX ADMIN — KETOROLAC TROMETHAMINE 30 MG: 30 INJECTION, SOLUTION INTRAMUSCULAR; INTRAVENOUS at 18:05

## 2025-05-26 RX ADMIN — CYCLOBENZAPRINE HYDROCHLORIDE 5 MG: 10 TABLET, FILM COATED ORAL at 20:37

## 2025-05-26 RX ADMIN — CYCLOBENZAPRINE HYDROCHLORIDE 5 MG: 10 TABLET, FILM COATED ORAL at 09:28

## 2025-05-26 RX ADMIN — DAKIN'S SOLUTION 0.125% (QUARTER STRENGTH): 0.12 SOLUTION at 08:30

## 2025-05-26 RX ADMIN — METRONIDAZOLE 500 MG: 500 INJECTION, SOLUTION INTRAVENOUS at 01:09

## 2025-05-26 RX ADMIN — KETOROLAC TROMETHAMINE 30 MG: 30 INJECTION, SOLUTION INTRAMUSCULAR; INTRAVENOUS at 12:36

## 2025-05-26 RX ADMIN — FLUTICASONE FUROATE AND VILANTEROL TRIFENATATE 1 PUFF: 100; 25 POWDER RESPIRATORY (INHALATION) at 09:23

## 2025-05-26 RX ADMIN — HYDROCODONE BITARTRATE AND ACETAMINOPHEN 1 TABLET: 5; 325 TABLET ORAL at 20:37

## 2025-05-26 RX ADMIN — CIPROFLOXACIN 400 MG: 400 INJECTION, SOLUTION INTRAVENOUS at 04:36

## 2025-05-26 RX ADMIN — ENOXAPARIN SODIUM 40 MG: 100 INJECTION SUBCUTANEOUS at 23:33

## 2025-05-26 RX ADMIN — METRONIDAZOLE 500 MG: 500 INJECTION, SOLUTION INTRAVENOUS at 10:16

## 2025-05-26 RX ADMIN — VANCOMYCIN HYDROCHLORIDE 1500 MG: 1.5 INJECTION, POWDER, LYOPHILIZED, FOR SOLUTION INTRAVENOUS at 08:23

## 2025-05-26 RX ADMIN — CIPROFLOXACIN 400 MG: 400 INJECTION, SOLUTION INTRAVENOUS at 16:26

## 2025-05-26 RX ADMIN — KETOROLAC TROMETHAMINE 30 MG: 30 INJECTION, SOLUTION INTRAMUSCULAR; INTRAVENOUS at 23:33

## 2025-05-26 RX ADMIN — ENOXAPARIN SODIUM 40 MG: 100 INJECTION SUBCUTANEOUS at 01:50

## 2025-05-26 RX ADMIN — MONTELUKAST 10 MG: 10 TABLET, FILM COATED ORAL at 08:22

## 2025-05-26 ASSESSMENT — COGNITIVE AND FUNCTIONAL STATUS - GENERAL
MOBILITY SCORE: 24
DAILY ACTIVITIY SCORE: 24
MOBILITY SCORE: 24
DAILY ACTIVITIY SCORE: 24

## 2025-05-26 ASSESSMENT — PAIN - FUNCTIONAL ASSESSMENT
PAIN_FUNCTIONAL_ASSESSMENT: 0-10
PAIN_FUNCTIONAL_ASSESSMENT: 0-10

## 2025-05-26 ASSESSMENT — PAIN SCALES - GENERAL
PAINLEVEL_OUTOF10: 0 - NO PAIN
PAINLEVEL_OUTOF10: 0 - NO PAIN
PAINLEVEL_OUTOF10: 9

## 2025-05-26 NOTE — PROGRESS NOTES
Vancomycin Dosing by Pharmacy- FOLLOW UP    Alisha Springer is a 59 y.o. year old female who Pharmacy has been consulted for vancomycin dosing for surgical wound infection. Based on the patient's indication and renal status this patient is being dosed based on a goal AUC of 400-600.     Renal function is currently stable.    Current vancomycin dose: 1500 mg given every 12 hours    Estimated vancomycin AUC on current dose: 700 mg/L.hr     Visit Vitals  /67   Pulse 73   Temp 36.8 °C (98.2 °F)   Resp 16        Lab Results   Component Value Date    CREATININE 0.37 (L) 2025    CREATININE 0.78 2025    CREATININE 0.57 2024    CREATININE 0.64 2018    CREATININE 0.92 2018        Patient weight is as follows:   Vitals:    25 0437   Weight: 73 kg (160 lb 15 oz)       Cultures:  No results found for the encounter in last 14 days.       I/O last 3 completed shifts:  In: 700 (9.6 mL/kg) [IV Piggyback:700]  Out: 1200 (16.4 mL/kg) [Urine:1200 (0.5 mL/kg/hr)]  Weight: 73 kg   I/O during current shift:  I/O this shift:  In: 120 [P.O.:120]  Out: -     Temp (24hrs), Av.1 °C (98.8 °F), Min:36.6 °C (97.9 °F), Max:37.6 °C (99.7 °F)      Assessment/Plan    Above goal AUC. Orders placed for new vancomcyin regimen of 1000 every 12 hours to begin at 2000 on .    This dosing regimen is predicted by [employer] to result in the following pharmacokinetic parameters:  Regimen: 1000 mg IV every 12 hours.  Start time: 20:23 on 2025  Exposure target: AUC24 (range) 400-600 mg/L.hr   [id number]: 486 mg/L.hr  AUC24,ss: 474 mg/L.hr  Probability of AUC24 > 400: 86 %  Ctrough,ss: 14.1 mg/L  Probability of Ctrough,ss > 20: 7 %    The next level will be obtained on  with AM labs. May be obtained sooner if clinically indicated.   Will continue to monitor renal function daily while on vancomycin and order serum creatinine at least every 48 hours if not already ordered.  Follow for continued vancomycin  needs, clinical response, and signs/symptoms of toxicity.       Sriram Frank, PharmD

## 2025-05-26 NOTE — PROGRESS NOTES
"  Department of Plastic and Reconstructive Surgery  Daily Progress Note    Patient Name: Alisha Springer  MRN: [id number]  Date:  05/26/25     Subjective  Patient is found resting in bed this AM with some discomfort involving her lower abdomen. She reports skipping her last dose of pain medication since \" she did not need it\", however, she is requesting medicine to help with the \" spasms\" that occur intermittently. Otherwise, she is eating, drinking and voiding spontaneously. Denies any fever, chills, night sweats, CP, SOB, palpitations, nausea, vomiting, diarrhea, constipation or dysuria.      Overnight Events  None     Objective    Vital Signs  /64   Pulse 69   Temp 37.6 °C (99.7 °F) (Temporal)   Resp 16   Ht 1.6 m (5' 3\")   Wt 73 kg (160 lb 15 oz)   SpO2 96%   BMI 28.51 kg/m²      Physical Exam  Constitutional:       Appearance: Normal appearance. She is normal weight.   HENT:      Head: Normocephalic and atraumatic.      Nose: Nose normal.      Mouth/Throat:      Mouth: Mucous membranes are moist.      Pharynx: Oropharynx is clear.   Eyes:      Extraocular Movements: Extraocular movements intact.      Conjunctiva/sclera: Conjunctivae normal.      Pupils: Pupils are equal, round, and reactive to light.   Cardiovascular:      Rate and Rhythm: Normal rate and regular rhythm.   Pulmonary:      Effort: Pulmonary effort is normal.   Abdominal:      General: Abdomen is flat.      Palpations: Abdomen is soft.   Musculoskeletal:         General: Normal range of motion.      Cervical back: Normal range of motion.   Skin:     General: Skin is warm and dry.      Capillary Refill: Capillary refill takes less than 2 seconds.   Neurological:      General: No focal deficit present.      Mental Status: She is alert and oriented to person, place, and time. Mental status is at baseline.   Psychiatric:         Mood and Affect: Mood normal.         Behavior: Behavior normal.         Thought Content: Thought content normal. "      Focused exam: Wound vac involving the umbilicus and extending inferiorly to the central lower quadrant abdominal incision is suctioning appropriately at -125 mm Hg medium, continuous suction. Dakins solution is irrigating appropriately with no leaks or malfunctions. The surrounding erythema was previous markings has reduced compared to previous exams. Remaining abdomen is soft, supple with no swelling, skin changes or additional wound breakdown. Remaining lower abdominal incision is c/d/I.     Diagnostics   Results for orders placed or performed during the hospital encounter of 05/24/25 (from the past 24 hours)   Vancomycin   Result Value Ref Range    Vancomycin 18.5 5.0 - 20.0 ug/mL     Imaging  CT abdomen pelvis w IV contrast  Result Date: 5/24/2025  IMPRESSION: Right renal cyst not excluded. Appendix not identified. Moderate stool burden. Soft tissue stranding in the anterior abdominal wall with air bubbles. Large fluid collection along the right lateral aspect of the pelvis.   [facility]   Transcribe Date/Time: May 24 2025  6:27P Dictated by : JORDI BHATIA MD This examination was interpreted and the report reviewed and electronically signed by: JORDI BHATIA MD on May 24 2025  6:39PM  EST      Cardiology, Vascular, and Other Imaging  CT abdomen pelvis w IV contrast  Result Date: 5/24/2025  * * *Final Report* * * DATE OF EXAM: May 24 2025  4:04PM   [facility]   0530  -  CT ABD/PEL W IVCON  / ACCESSION #  [id number] PROCEDURE REASON: Abdominal abscess/infection suspected      * * * * Physician Interpretation * * * *  EXAMINATION:  CT ABDOMEN AND PELVIS WITH IV CONTRAST CLINICAL HISTORY: Lower abdominal pain.  A recent hernia and bladder prolapse surgery. TECHNIQUE: CT of the abdomen and pelvis was performed using standard technique, scanning from just above the dome of the diaphragm to the symphysis pubis. MQ:  CTAP_3 Contrast: IV:  100 ml of Omnipaque 350 CT Radiation dose: Integrated Dose-length product  (DLP) for this visit =   628 mGy*cm. CT Dose Reduction Employed: Automated exposure control(AEC) and iterative recon COMPARISON: CT abdomen pelvis on 01/18/2025 RESULT: Liver: A new low-attenuation lesions or cysts visualized measuring up to 7 mm, unchanged. Biliary: No bile duct dilation.  No CT evidence of gallbladder stones. Spleen: No mass. No splenomegaly. Pancreas: No mass or duct dilation. Adrenals: No mass. Kidneys: There is a questionable parapelvic cyst in the right kidney.  No hydroureteronephrosis. GI tract: No dilation or wall thickening. The appendix is not identified.  No diverticulitis.  Moderate amount of stool and gas noted in the large bowel loops. Lymph nodes: No abdominal or pelvic lymphadenopathy. Mesentery/Peritoneum: No ascites or mass or free abdominal air. Retroperitoneum: No mass.   Vasculature: The celiac artery, SMA, YOVANI, portal veins and hepatic veins are patent.  No AAA. Pelvis: No mass, ascites or fluid collection. The uterus is surgically absent. Bones/Soft Tissues: Soft tissue stranding seen in the anterior aspect of the abdominal wall, with multiple air bubbles.  Also noted is a 3.6 x 8.3 cm fluid collection along the right lateral aspect of the pelvis, series 2 image 71.  Hernia mesh visualized.  The spine shows mild degenerative changes. Lower thorax: No pleural effusions.  Dependent atelectasis seen in the bilateral lower lobes.  Patient is status post fundoplication. Localizer images: No additional findings.    IMPRESSION: Right renal cyst not excluded. Appendix not identified. Moderate stool burden. Soft tissue stranding in the anterior abdominal wall with air bubbles. Large fluid collection along the right lateral aspect of the pelvis.   : [facility]   Transcribe Date/Time: May 24 2025  6:27P Dictated by : JORDI BHATIA MD This examination was interpreted and the report reviewed and electronically signed by: JORDI BHATIA MD on May 24 2025  6:39PM  EST        Current  "Medications  Scheduled medications  Scheduled Medications[1]  Continuous medications  Continuous Medications[2]  PRN medications  PRN Medications[3]     Assessment  Alisha Springer is a 59 y.o. female with a past medical history of asthma, GERD s/p fundoplication, depression, endometriosis, nephrolithiasis, rectocele, female cystocele who is s/p [id number] panniculectomy, ventral hernia repair, sacrospinous ligament suspension for cystocele on 05/12/2025 with Dr. Allison and Dr. Thompson who presented to the ED as a transfer from [facility] for admission under plastic surgery for surgical wound infection of her abdomen. At [facility], CT abdomen pelvis showed soft tissue stranding in the abdominal wall with multiple air bubbles, and large fluid collection along the right lateral aspect of the pelvis. On arrival, patient was hemodynamically stable. She has complaints of extreme nausea without vomiting, significant pain at her abdomen, fever and chills overnight. AN excision debridement at bedside with application of irrigating wound vac was placed. She was admitted under plastic surgery service for IV antibiotics and continued wound care with irrigating wound vac.     Plan/Recommendations  S/P [id number] panniculectomy and ventral hernia repair with Dr. Allison 05/12/2025    Wound infection  - Admit inpatient to plastics for IV Abx and wound care with irrigating vac       . IV Vanco dose per pharmacy, Ciprofloxacin and Flagyl  - 5/24: Wound culture taken at bedside and pending from OSH   - 5/25 wound culture: (4+) Abundant Polymorphonuclear leukocytes, (4+) Abundant Mixed Gram Positive and Gram negative bacteria.    - 5/22 wound culture: \"no component results\"  - Consider ID consult based on cultures  - Monitor labs  - Vital signs Q4, ambulate without assistance  F: none, encourage PO intake  E: monitor and replete PRN  N: regular diet  GI: Colace BID   Dvt ppx:   - lovenox 40mg daily  - SCD's  - encourage ambulation  - " S/P bedside debridement of lower abdominal wound 05/24 (See separate procedural note for details)  - Maintain irrigating wound vac to abdominal surgical site per plastic surgery         ·  First/Next bedside vac dressing change anticipated 05/27        ·  Settings: -125mmHg, medium, continuous suction       ·  Maintain irrigating wound vac with Dakins 0.125% installation 15cc, every 2 hours,dwell time 10 minutes.        ·  Nursing to monitor and record vac canister output at least q8h (please record 0 for no output)       ·  For any issues or concerns with vac, please notify plastic surgery team at [phone] or pager #[phone]     Acute pain  - PRN pain management:   - Norco 5-325 mg Q6 hours PRN (severe pain)   - Naproxen 500 mg BID PRN (mild pain)     Asthma  - Continue home Breo Ellipta and Singulair  - Stable, consult RT as needed     Disposition: Continue care on RNF. Will remain inpatient for IV Abx and local wound care.    Patient and plan discussed with Dr Keegan Richards PA-C    Plastic and Reconstructive Surgery   [place]  Pager #[phone]  Team phone: [phone]           [1] ciprofloxacin, 400 mg, intravenous, q12h  enoxaparin, 40 mg, subcutaneous, q24h  fluticasone furoate-vilanteroL, 1 puff, inhalation, Daily  ketorolac, 30 mg, intravenous, q6h DIONI  metroNIDAZOLE, 500 mg, intravenous, q8h  montelukast, 10 mg, oral, q AM  sodium hypochlorite, , irrigation, Daily  vancomycin, 1,500 mg, intravenous, q12h  [2]    [3] PRN medications: cyclobenzaprine, diphenhydrAMINE, HYDROcodone-acetaminophen, ondansetron, vancomycin

## 2025-05-26 NOTE — HOSPITAL COURSE
Brief History:  Alisha Springer is a 59 y.o. female with a past medical history of asthma, GERD s/p fundoplication, depression, endometriosis, nephrolithiasis, rectocele, female cystocele who is s/p [id number] panniculectomy, ventral hernia repair, sacrospinous ligament suspension for cystocele on 05/12/2025 with Dr. Allison and Dr. Thompson who presented to the ED on 5/25 after a transfer from [facility] for admission under plastic surgery for surgical wound infection at her abdomen. At [facility], CT abdomen pelvis showed soft tissue stranding in the abdominal wall with multiple air bubbles, and large fluid collection along the right lateral aspect of the pelvis. On arrival to the ED, patient is hemodynamically stable. She had complaints of extreme nausea without vomiting, significant pain at her abdomen, fever and chills since 5/24. Reports overnight she had a low grade fever of 99.9 to 100.1 and leukocytosis (WBC 11.5) seen on labs. She has been taking Flexeril for pain and one Norco at night with relief of symptoms. Describes the pain as her abdomen is worse with movement to the R side and is about a 9/10, while laying down without movement it is a 5/10. ED took wound cultures at bedside. Plastic surgery to admit for IV abx and wound care with irrigating wound vac.     Hospital Course:  Patient was taken to [place] [address] Floor on 5/25 for irrigating wound vac application (Dakins solution) and IV antibiotics ( Flagyl, Vanc and Cipro). Her progress was monitored during her admission with stable VS, labs and reduction of pain. During her hospital course Tissue culture from post debridement positive for pseudomonas, Abundant Morganella morganii and abundant mixed gram positive and gram negative bacteria. Based on cultures, patient was taken back to the OR for washout and debridement on 5/29 with Dr Schmitz.     Consultations:  ID consulted 5/26 for tailoring of antibiotics for positive culture. Recommending Vancomycin,  Cipro and Flagyl for antibiotics. ID recommended IR drainage for right flank fluid collection. IR consulted and aspirated 5 ml of brown purulent drainage and sent to cytology/microbiology on 5/30. Drain remained in place.     Day of discharge  On the day of discharge, the patient was seen and evaluated by the Plastic Surgery team and deemed suitable for discharge to home with home health care.  There were no significant events overnight. Vitals were reviewed and within normal limits. Labs were stable at discharge. On day of discharge the patient was tolerating a diet, pain was controlled on PO pain medication, was ambulating well and voiding spontaneously. The patient was given detailed discharge instructions and was scheduled to follow up as an outpatient.

## 2025-05-26 NOTE — CARE PLAN
Problem: Pain - Adult  Goal: Verbalizes/displays adequate comfort level or baseline comfort level  Outcome: Progressing     Problem: Safety - Adult  Goal: Free from fall injury  Outcome: Progressing     Problem: Discharge Planning  Goal: Discharge to home or other facility with appropriate resources  Outcome: Progressing     Problem: Chronic Conditions and Co-morbidities  Goal: Patient's chronic conditions and co-morbidity symptoms are monitored and maintained or improved  Outcome: Progressing     Problem: Nutrition  Goal: Nutrient intake appropriate for maintaining nutritional needs  Outcome: Progressing   The patient's goals for the shift include      The clinical goals for the shift include Patient will remain hemodynamically stable and free from fall/injury for entirety of shift.

## 2025-05-27 ENCOUNTER — APPOINTMENT (OUTPATIENT)
Dept: RADIOLOGY | Facility: HOSPITAL | Age: 60
End: 2025-05-27
Payer: COMMERCIAL

## 2025-05-27 LAB
ANION GAP SERPL CALC-SCNC: 11 MMOL/L (ref 10–20)
BUN SERPL-MCNC: 24 MG/DL (ref 6–23)
CALCIUM SERPL-MCNC: 8.6 MG/DL (ref 8.6–10.6)
CHLORIDE SERPL-SCNC: 108 MMOL/L (ref 98–107)
CO2 SERPL-SCNC: 25 MMOL/L (ref 21–32)
CREAT SERPL-MCNC: 0.52 MG/DL (ref 0.5–1.05)
CRP SERPL-MCNC: 6.86 MG/DL
EGFRCR SERPLBLD CKD-EPI 2021: >90 ML/MIN/1.73M*2
ERYTHROCYTE [DISTWIDTH] IN BLOOD BY AUTOMATED COUNT: 13.2 % (ref 11.5–14.5)
ERYTHROCYTE [SEDIMENTATION RATE] IN BLOOD BY WESTERGREN METHOD: 33 MM/H (ref 0–30)
GLUCOSE SERPL-MCNC: 101 MG/DL (ref 74–99)
HCT VFR BLD AUTO: 33.5 % (ref 36–46)
HGB BLD-MCNC: 10.8 G/DL (ref 12–16)
MCH RBC QN AUTO: 30 PG (ref 26–34)
MCHC RBC AUTO-ENTMCNC: 32.2 G/DL (ref 32–36)
MCV RBC AUTO: 93 FL (ref 80–100)
NRBC BLD-RTO: 0 /100 WBCS (ref 0–0)
PLATELET # BLD AUTO: 333 X10*3/UL (ref 150–450)
POTASSIUM SERPL-SCNC: 4.1 MMOL/L (ref 3.5–5.3)
RBC # BLD AUTO: 3.6 X10*6/UL (ref 4–5.2)
SODIUM SERPL-SCNC: 140 MMOL/L (ref 136–145)
WBC # BLD AUTO: 10.7 X10*3/UL (ref 4.4–11.3)

## 2025-05-27 PROCEDURE — 2500000001 HC RX 250 WO HCPCS SELF ADMINISTERED DRUGS (ALT 637 FOR MEDICARE OP)

## 2025-05-27 PROCEDURE — 94640 AIRWAY INHALATION TREATMENT: CPT

## 2025-05-27 PROCEDURE — 85027 COMPLETE CBC AUTOMATED: CPT | Performed by: NURSE PRACTITIONER

## 2025-05-27 PROCEDURE — 1170000001 HC PRIVATE ONCOLOGY ROOM DAILY

## 2025-05-27 PROCEDURE — 2500000004 HC RX 250 GENERAL PHARMACY W/ HCPCS (ALT 636 FOR OP/ED): Mod: JZ

## 2025-05-27 PROCEDURE — 36415 COLL VENOUS BLD VENIPUNCTURE: CPT | Performed by: NURSE PRACTITIONER

## 2025-05-27 PROCEDURE — 99231 SBSQ HOSP IP/OBS SF/LOW 25: CPT

## 2025-05-27 PROCEDURE — 99223 1ST HOSP IP/OBS HIGH 75: CPT | Performed by: INTERNAL MEDICINE

## 2025-05-27 PROCEDURE — 85652 RBC SED RATE AUTOMATED: CPT | Performed by: NURSE PRACTITIONER

## 2025-05-27 PROCEDURE — 80048 BASIC METABOLIC PNL TOTAL CA: CPT | Performed by: NURSE PRACTITIONER

## 2025-05-27 PROCEDURE — 2500000004 HC RX 250 GENERAL PHARMACY W/ HCPCS (ALT 636 FOR OP/ED): Mod: JZ | Performed by: PHYSICIAN ASSISTANT

## 2025-05-27 PROCEDURE — 86140 C-REACTIVE PROTEIN: CPT | Performed by: NURSE PRACTITIONER

## 2025-05-27 RX ADMIN — MONTELUKAST 10 MG: 10 TABLET, FILM COATED ORAL at 08:16

## 2025-05-27 RX ADMIN — ONDANSETRON 4 MG: 2 INJECTION INTRAMUSCULAR; INTRAVENOUS at 15:32

## 2025-05-27 RX ADMIN — CIPROFLOXACIN 400 MG: 400 INJECTION, SOLUTION INTRAVENOUS at 16:02

## 2025-05-27 RX ADMIN — KETOROLAC TROMETHAMINE 30 MG: 30 INJECTION, SOLUTION INTRAMUSCULAR; INTRAVENOUS at 05:21

## 2025-05-27 RX ADMIN — ENOXAPARIN SODIUM 40 MG: 100 INJECTION SUBCUTANEOUS at 23:55

## 2025-05-27 RX ADMIN — DIPHENHYDRAMINE HYDROCHLORIDE 25 MG: 50 INJECTION INTRAMUSCULAR; INTRAVENOUS at 21:19

## 2025-05-27 RX ADMIN — KETOROLAC TROMETHAMINE 30 MG: 30 INJECTION, SOLUTION INTRAMUSCULAR; INTRAVENOUS at 11:17

## 2025-05-27 RX ADMIN — DAKIN'S SOLUTION 0.125% (QUARTER STRENGTH): 0.12 SOLUTION at 08:16

## 2025-05-27 RX ADMIN — ONDANSETRON 4 MG: 2 INJECTION INTRAMUSCULAR; INTRAVENOUS at 23:59

## 2025-05-27 RX ADMIN — KETOROLAC TROMETHAMINE 30 MG: 30 INJECTION, SOLUTION INTRAMUSCULAR; INTRAVENOUS at 17:52

## 2025-05-27 RX ADMIN — METRONIDAZOLE 500 MG: 500 INJECTION, SOLUTION INTRAVENOUS at 17:52

## 2025-05-27 RX ADMIN — HYDROCODONE BITARTRATE AND ACETAMINOPHEN 1 TABLET: 5; 325 TABLET ORAL at 21:12

## 2025-05-27 RX ADMIN — ONDANSETRON 4 MG: 2 INJECTION INTRAMUSCULAR; INTRAVENOUS at 06:34

## 2025-05-27 RX ADMIN — FLUTICASONE FUROATE AND VILANTEROL TRIFENATATE 1 PUFF: 100; 25 POWDER RESPIRATORY (INHALATION) at 08:16

## 2025-05-27 RX ADMIN — VANCOMYCIN HYDROCHLORIDE 1000 MG: 1 INJECTION, SOLUTION INTRAVENOUS at 08:16

## 2025-05-27 RX ADMIN — CYCLOBENZAPRINE HYDROCHLORIDE 5 MG: 10 TABLET, FILM COATED ORAL at 21:12

## 2025-05-27 RX ADMIN — CIPROFLOXACIN 400 MG: 400 INJECTION, SOLUTION INTRAVENOUS at 05:21

## 2025-05-27 RX ADMIN — METRONIDAZOLE 500 MG: 500 INJECTION, SOLUTION INTRAVENOUS at 11:17

## 2025-05-27 RX ADMIN — METRONIDAZOLE 500 MG: 500 INJECTION, SOLUTION INTRAVENOUS at 23:55

## 2025-05-27 RX ADMIN — CYCLOBENZAPRINE HYDROCHLORIDE 5 MG: 10 TABLET, FILM COATED ORAL at 08:16

## 2025-05-27 RX ADMIN — KETOROLAC TROMETHAMINE 30 MG: 30 INJECTION, SOLUTION INTRAMUSCULAR; INTRAVENOUS at 23:55

## 2025-05-27 RX ADMIN — VANCOMYCIN HYDROCHLORIDE 1000 MG: 1 INJECTION, SOLUTION INTRAVENOUS at 21:12

## 2025-05-27 ASSESSMENT — COGNITIVE AND FUNCTIONAL STATUS - GENERAL
MOBILITY SCORE: 24
DAILY ACTIVITIY SCORE: 24
DAILY ACTIVITIY SCORE: 24
MOBILITY SCORE: 24

## 2025-05-27 ASSESSMENT — PAIN SCALES - GENERAL
PAINLEVEL_OUTOF10: 0 - NO PAIN
PAINLEVEL_OUTOF10: 0 - NO PAIN

## 2025-05-27 ASSESSMENT — PAIN - FUNCTIONAL ASSESSMENT: PAIN_FUNCTIONAL_ASSESSMENT: 0-10

## 2025-05-27 NOTE — PROGRESS NOTES
Vancomycin Dosing by Pharmacy- FOLLOW UP    Alisha Springer is a 59 y.o. year old female who Pharmacy has been consulted for vancomycin dosing for other surgical wound infection. Based on the patient's indication and renal status this patient is being dosed based on a goal AUC of 400-600.     Renal function is currently stable.    Current vancomycin dose: 1000 mg given every 12 hours    Estimated vancomycin AUC on current dose: 513 mg/L.hr     Visit Vitals  /80   Pulse 86   Temp 37 °C (98.6 °F)   Resp 18        Lab Results   Component Value Date    CREATININE 0.52 2025    CREATININE 0.37 (L) 2025    CREATININE 0.78 2025    CREATININE 0.57 2024        Patient weight is as follows:   Vitals:    25 0500   Weight: 74.2 kg (163 lb 9.3 oz)       Cultures:  Susceptibility data for the encounter in last 14 days.  Collected Specimen Info Organism   25 Tissue/Biopsy from Wound/Tissue Morganella morganii     Pseudomonas aeruginosa     Mixed Anaerobic Bacteria        I/O last 3 completed shifts:  In: 820 (11.1 mL/kg) [P.O.:120; IV Piggyback:700]  Out: 1950 (26.3 mL/kg) [Urine:1950 (0.7 mL/kg/hr)]  Weight: 74.2 kg   I/O during current shift:  No intake/output data recorded.    Temp (24hrs), Av.8 °C (98.3 °F), Min:36.2 °C (97.2 °F), Max:37.5 °C (99.5 °F)      Assessment/Plan    Within goal AUC range. Continue current vancomycin regimen.    This dosing regimen is predicted by [employer] to result in the following pharmacokinetic parameters:  Regimen: 1000 mg IV every 12 hours.  Start time: 20:16 on 2025  Exposure target: AUC24 (range) 400-600 mg/L.hr   [id number]: 515 mg/L.hr  AUC24,ss: 513 mg/L.hr  Probability of AUC24 > 400: 94 %  Ctrough,ss: 15.8 mg/L  Probability of Ctrough,ss > 20: 15 %  The next level will be obtained on  with AM labs. May be obtained sooner if clinically indicated.   Will continue to monitor renal function daily while on vancomycin and order serum  creatinine at least every 48 hours if not already ordered.  Follow for continued vancomycin needs, clinical response, and signs/symptoms of toxicity.       Tariq Gamble, PharmD

## 2025-05-27 NOTE — CARE PLAN
Problem: Pain - Adult  Goal: Verbalizes/displays adequate comfort level or baseline comfort level  Outcome: Progressing     Problem: Safety - Adult  Goal: Free from fall injury  Outcome: Progressing     Problem: Discharge Planning  Goal: Discharge to home or other facility with appropriate resources  Outcome: Progressing     Problem: Chronic Conditions and Co-morbidities  Goal: Patient's chronic conditions and co-morbidity symptoms are monitored and maintained or improved  Outcome: Progressing     Problem: Nutrition  Goal: Nutrient intake appropriate for maintaining nutritional needs  Outcome: Progressing   The patient's goals for the shift include      The clinical goals for the shift include Pt will remain HDS this shift

## 2025-05-27 NOTE — PROGRESS NOTES
"  Department of Plastic and Reconstructive Surgery  Daily Progress Note    Patient Name: Alisha Springer  MRN: [id number]  Date:  05/27/25     Subjective  Patient is found resting in bed this AM. Reports pain is well controlled and the muscle spasms at her abdomen is controlled with her muscle relaxer. Reports nausea and relief with taking Zofran. Otherwise, she is eating, drinking and voiding spontaneously. Denies any fever, chills, night sweats, CP, SOB, palpitations, vomiting, diarrhea, constipation or dysuria.      Overnight Events  None     Objective    Vital Signs  /73   Pulse 64   Temp 36.7 °C (98 °F)   Resp 18   Ht 1.6 m (5' 3\")   Wt 74.2 kg (163 lb 9.3 oz)   SpO2 98%   BMI 28.98 kg/m²      Physical Exam  Constitutional:       Appearance: Normal appearance. She is normal weight.   HENT:      Head: Normocephalic and atraumatic.      Nose: Nose normal.      Mouth/Throat:      Mouth: Mucous membranes are moist.      Pharynx: Oropharynx is clear.   Eyes:      Extraocular Movements: Extraocular movements intact.      Conjunctiva/sclera: Conjunctivae normal.      Pupils: Pupils are equal, round, and reactive to light.   Cardiovascular:      Rate and Rhythm: Normal rate and regular rhythm.   Pulmonary:      Effort: Pulmonary effort is normal.   Abdominal:      General: Abdomen is flat.      Palpations: Abdomen is soft.   Musculoskeletal:         General: Normal range of motion.      Cervical back: Normal range of motion.   Skin:     General: Skin is warm and dry.      Capillary Refill: Capillary refill takes less than 2 seconds.   Neurological:      General: No focal deficit present.      Mental Status: She is alert and oriented to person, place, and time. Mental status is at baseline.   Psychiatric:         Mood and Affect: Mood normal.         Behavior: Behavior normal.         Thought Content: Thought content normal.      Focused exam: Wound vac involving the umbilicus and extending inferiorly to the " central lower quadrant abdominal incision is suctioning appropriately at -125 mm Hg medium, continuous suction. Dakins solution is irrigating appropriately with no leaks or malfunctions. The surrounding erythema was previous markings has reduced compared to previous exams. Remaining abdomen is soft, supple with no swelling, skin changes or additional wound breakdown. Remaining lower abdominal incision is c/d/I.     Diagnostics   Results for orders placed or performed during the hospital encounter of 05/24/25 (from the past 24 hours)   CBC   Result Value Ref Range    WBC 10.7 4.4 - 11.3 x10*3/uL    nRBC 0.0 0.0 - 0.0 /100 WBCs    RBC 3.60 (L) 4.00 - 5.20 x10*6/uL    Hemoglobin 10.8 (L) 12.0 - 16.0 g/dL    Hematocrit 33.5 (L) 36.0 - 46.0 %    MCV 93 80 - 100 fL    MCH 30.0 26.0 - 34.0 pg    MCHC 32.2 32.0 - 36.0 g/dL    RDW 13.2 11.5 - 14.5 %    Platelets 333 150 - 450 x10*3/uL   Sedimentation rate, automated   Result Value Ref Range    Sedimentation Rate 33 (H) 0 - 30 mm/h   C-reactive protein   Result Value Ref Range    C-Reactive Protein 6.86 (H) <1.00 mg/dL   Basic metabolic panel   Result Value Ref Range    Glucose 101 (H) 74 - 99 mg/dL    Sodium 140 136 - 145 mmol/L    Potassium 4.1 3.5 - 5.3 mmol/L    Chloride 108 (H) 98 - 107 mmol/L    Bicarbonate 25 21 - 32 mmol/L    Anion Gap 11 10 - 20 mmol/L    Urea Nitrogen 24 (H) 6 - 23 mg/dL    Creatinine 0.52 0.50 - 1.05 mg/dL    eGFR >90 >60 mL/min/1.73m*2    Calcium 8.6 8.6 - 10.6 mg/dL     Imaging  CT abdomen pelvis w IV contrast  Result Date: 5/24/2025  IMPRESSION: Right renal cyst not excluded. Appendix not identified. Moderate stool burden. Soft tissue stranding in the anterior abdominal wall with air bubbles. Large fluid collection along the right lateral aspect of the pelvis.   : VISH   Transcribe Date/Time: May 24 2025  6:27P Dictated by : JORDI BHATIA MD This examination was interpreted and the report reviewed and electronically signed by:  JORDI BHATIA MD on May 24 2025  6:39PM  EST      Cardiology, Vascular, and Other Imaging  CT abdomen pelvis w IV contrast  Result Date: 5/24/2025  * * *Final Report* * * DATE OF EXAM: May 24 2025  4:04PM   [facility]   0530  -  CT ABD/PEL W IVCON  / ACCESSION #  [id number] PROCEDURE REASON: Abdominal abscess/infection suspected      * * * * Physician Interpretation * * * *  EXAMINATION:  CT ABDOMEN AND PELVIS WITH IV CONTRAST CLINICAL HISTORY: Lower abdominal pain.  A recent hernia and bladder prolapse surgery. TECHNIQUE: CT of the abdomen and pelvis was performed using standard technique, scanning from just above the dome of the diaphragm to the symphysis pubis. MQ:  CTAP_3 Contrast: IV:  100 ml of Omnipaque 350 CT Radiation dose: Integrated Dose-length product (DLP) for this visit =   628 mGy*cm. CT Dose Reduction Employed: Automated exposure control(AEC) and iterative recon COMPARISON: CT abdomen pelvis on 01/18/2025 RESULT: Liver: A new low-attenuation lesions or cysts visualized measuring up to 7 mm, unchanged. Biliary: No bile duct dilation.  No CT evidence of gallbladder stones. Spleen: No mass. No splenomegaly. Pancreas: No mass or duct dilation. Adrenals: No mass. Kidneys: There is a questionable parapelvic cyst in the right kidney.  No hydroureteronephrosis. GI tract: No dilation or wall thickening. The appendix is not identified.  No diverticulitis.  Moderate amount of stool and gas noted in the large bowel loops. Lymph nodes: No abdominal or pelvic lymphadenopathy. Mesentery/Peritoneum: No ascites or mass or free abdominal air. Retroperitoneum: No mass.   Vasculature: The celiac artery, SMA, YOVANI, portal veins and hepatic veins are patent.  No AAA. Pelvis: No mass, ascites or fluid collection. The uterus is surgically absent. Bones/Soft Tissues: Soft tissue stranding seen in the anterior aspect of the abdominal wall, with multiple air bubbles.  Also noted is a 3.6 x 8.3 cm fluid collection along the right lateral  aspect of the pelvis, series 2 image 71.  Hernia mesh visualized.  The spine shows mild degenerative changes. Lower thorax: No pleural effusions.  Dependent atelectasis seen in the bilateral lower lobes.  Patient is status post fundoplication. Localizer images: No additional findings.    IMPRESSION: Right renal cyst not excluded. Appendix not identified. Moderate stool burden. Soft tissue stranding in the anterior abdominal wall with air bubbles. Large fluid collection along the right lateral aspect of the pelvis.   : PSCB   Transcribe Date/Time: May 24 2025  6:27P Dictated by : JORDI BHATIA MD This examination was interpreted and the report reviewed and electronically signed by: JORDI BHATIA MD on May 24 2025  6:39PM  EST        Current Medications  Scheduled medications  Scheduled Medications[1]  Continuous medications  Continuous Medications[2]  PRN medications  PRN Medications[3]     Assessment  Alisha Springer is a 59 y.o. female with a past medical history of asthma, GERD s/p fundoplication, depression, endometriosis, nephrolithiasis, rectocele, female cystocele who is s/p [id number] panniculectomy, ventral hernia repair, sacrospinous ligament suspension for cystocele on 05/12/2025 with Dr. Allison and Dr. Thompson who presented to the ED as a transfer from [facility] for admission under plastic surgery for surgical wound infection of her abdomen. At [facility], CT abdomen pelvis showed soft tissue stranding in the abdominal wall with multiple air bubbles, and large fluid collection along the right lateral aspect of the pelvis. On arrival, patient was hemodynamically stable. She has complaints of extreme nausea without vomiting, significant pain at her abdomen, fever and chills overnight. AN excision debridement at bedside with application of irrigating wound vac was placed. She was admitted under plastic surgery service for IV antibiotics and continued wound care with irrigating wound vac.  "    Plan/Recommendations  S/P daniele panniculectomy and ventral hernia repair with Dr. Allison 05/12/2025    Wound infection  - Admit inpatient to plastics for IV Abx and wound care with irrigating vac       . IV Vanco dose per pharmacy, Ciprofloxacin and Flagyl  - 5/24: Wound culture taken at bedside and pending from OSH   - 5/25 wound culture: (4+) Abundant Polymorphonuclear leukocytes,(4+) Abundant Morganella morganii, (2+) Few Pseudomonas aeruginosa, (2+) Few mixed Anaerobic Bacteria Positive, (4+) Abundant Mixed Gram Positive and Gram negative bacteria.    - 5/22 wound culture: \"no component results\"  - ID consult based on cultures, ID to see patient today 05/27  - Monitor labs, CRP 6.86 today 05/27 continue to monitor  - Vital signs Q4, ambulate without assistance  F: none, encourage PO intake  E: monitor and replete PRN  N: regular diet  GI: Colace BID   Dvt ppx:   - lovenox 40mg daily  - SCD's  - encourage ambulation  - S/P bedside debridement of lower abdominal wound 05/24 (See separate procedural note for details)  - Maintain irrigating wound vac to abdominal surgical site per plastic surgery         ·  First/Next bedside vac dressing change anticipated 05/28 AM, plan to switch from irrigating vac to regular wound vac pending wound evaluation        ·  Settings: -125mmHg, medium, continuous suction       ·  Maintain irrigating wound vac with Dakins 0.125% installation 15cc, every 2 hours,dwell time 10 minutes.        ·  Nursing to monitor and record vac canister output at least q8h (please record 0 for no output)       ·  For any issues or concerns with vac, please notify plastic surgery team at [phone] or pager #[phone]     Acute pain  - PRN pain management:   - Norco 5-325 mg Q6 hours PRN (severe pain)   - Naproxen 500 mg BID PRN (mild pain)     Asthma  - Continue home Breo Ellipta and Singulair  - Stable, consult RT as needed     Disposition: Continue care on RNF. Will remain inpatient for IV Abx and " local wound care.    Patient and plan discussed with Dr Keegan Richards PA-C    Plastic and Reconstructive Surgery   [place]  Pager #[phone]  Team phone: [phone]           [1] ciprofloxacin, 400 mg, intravenous, q12h  enoxaparin, 40 mg, subcutaneous, q24h  fluticasone furoate-vilanteroL, 1 puff, inhalation, Daily  ketorolac, 30 mg, intravenous, q6h DIONI  metroNIDAZOLE, 500 mg, intravenous, q8h  montelukast, 10 mg, oral, q AM  sodium hypochlorite, , irrigation, Daily  vancomycin, 1,000 mg, intravenous, q12h     [2]    [3] PRN medications: cyclobenzaprine, diphenhydrAMINE, HYDROcodone-acetaminophen, ondansetron, vancomycin

## 2025-05-27 NOTE — CARE PLAN
The patient's goals for the shift include      The clinical goals for the shift include pt will remain hemodynamically stable

## 2025-05-27 NOTE — CONSULTS
"Inpatient consult to Infectious Diseases  Consult performed by: Pierre Villalobos MD  Consult ordered by: Evy Richards PA-C      Primary MD: Jacque Fuchs MD    Reason For Consult: Antibiotic treatment    History Of Present Illness  Chart Review:    04/28/25 PAT note excerpt:  59-year-old female presents with cystocele with prolapse scheduled for colpopexy sacrospinous, dermatolipectomy, abdomen, repair abdominal wall, repair, hernia, ventral on 5/12/2025. States she has had a history of bladder prolapse and it has worsened recently. . . . . She recently has been on oral steroids for eczema exacerbation. Denies recent fever/illness/chills.   NARES:  No S. aureus isolated     5/12/25 surgical history and OR note excerpts:  \". . .hanging apron of skin and fat below the level of the inferior pubis ramus. On exam there are 2 rolls present above and below the umbilicus with laxity of the skin I. . .  maceration of the skin of the infrapannicular skin. There is hyperpigmentation of the skin in the infrapannicular fold. Due to multidimensional skin laxity [id number] abdominoplasty wound improve and restore these findings. She has a history of abdominal hernia and repair.\"  \". . .This extra skin is causing deep sores which have to be treated with oral medicine and affects the patient's ability to complete activities of daily living such as grooming and dressing.  Abdominal panniculectomy would improve and restore these things.\"  \"CT abdomen/pelvis performed on 11/5/2024: This shows a fat-containing ventral abdominal hernia noted through a diastases measuring 1.5 cm, the hernia sac measures 2.3 x 1.1 x 2.7 cm\"  PROCEDURE:  [id number] panniculectomy  Repair of recurrent ventral hernia reducible, 3-10 cm,   Use of bioabsorbable mesh  Injection of multiple intercostal nerves for postoperative analgesia not intraoperative pain control  Removal of foreign bodies/previous suture material  PROCEDURE:  Sacrospinous ligament " "suspension (left-sided)  Posterior repair    05/13/2025 Discharge on Clindamycin  5/16/2025 Khoury catheter removed  05/19/2025 OP Plastic surgery:     SUNSHINE drains removed    C/F T-point breakdown (apply Santyl)    05/25/2025 Admission for wound infection around umbilicus:   CT shows: \"Soft tissue stranding seen in the anterior aspect of the abdominal wall, with multiple air bubbles.  Also noted is a 3.6 x 8.3 cm fluid collection along the right lateral aspect of the pelvis, (series 2 image 71).  Hernia mesh visualized.\"   WBC 9.9   Bedside debridement and wound vac   ON CIPRO, FLAGYL AND VANCO  05/25/2025 Necrotic wound culture:        Pseudomonas (S: Cip/levo,Ceftaz, Zosyn)        Morganella: abx pending         Mixed Anaerobic:    TODAY 5/27/2025:      Reviewed hospital course and recent events with patient.  Patient had lost 180 pounds (with diet and exercise) in an effort to improve her health and general wellbeing so that she could help live a long healthy life and assist her family friends and children.  Quite motivated.  On 5/12/2025 patient underwent panniculectomy, reduction of ventral abdominal hernia with placement of a bioabsorbable mesh.  She developed postoperative umbilical wound 5/19/25 that she noted was draining for several days and associated with a foul odor.  Patient also had some chills but denied overt fever and rigors.  Patient also noted some right lateral incision redness swelling and pain.  This extended to the left right lateral flank involving a large panniculectomy incision.       Patient reports good urinary flow following vaginal suspension also on 5/12/2025.    I reviewed patient's allergies which she describes as anaphylaxis to cephalexin, penicillin, and tetracycline.  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    Surgical History  She has a past surgical history that includes Hysterectomy (11/15/2016); Knee surgery (11/15/2016); Other surgical history (07/24/2018); Hernia repair (04/23/2018); Ectopic pregnancy surgery; and Bladder surgery.     Social History     Occupational History    Not on file   Tobacco Use    Smoking status: Never     Passive exposure: Never    Smokeless tobacco: Never   Vaping Use    Vaping status: Never Used   Substance and Sexual Activity    Alcohol use: Never    Drug use: Never    Sexual activity: Yes     Travel History   Travel since 04/27/25    No documented travel since 04/27/25       Family History  Family History[1]  Allergies  Cephalexin, Erythromycin, Penicillins, Tetracyclines, Codeine, and Bupropion     There is no immunization history on file for this patient.    Medications  Current medications:  Scheduled medications  ciprofloxacin, 400 mg, intravenous, q12h  enoxaparin, 40 mg, subcutaneous, q24h  fluticasone furoate-vilanteroL, 1 puff, inhalation, Daily  ketorolac, 30 mg, intravenous, q6h DIONI  metroNIDAZOLE, 500 mg, intravenous, q8h  montelukast, 10 mg, oral, q AM  sodium hypochlorite, , irrigation, Daily  vancomycin, 1,000 mg, intravenous, q12h    Objective  Range of Vitals (last 24 hours)  Heart Rate:  [62-86]   Temp:  [36.2 °C (97.2 °F)-37.5 °C (99.5 °F)]   Resp:  [16-18]   BP: (106-123)/(61-80)   Weight:  [74.2 kg (163 lb 9.3 oz)]   SpO2:  [94 %-98 %]   Daily Weight  05/27/25 :  "74.2 kg (163 lb 9.3 oz)    Body mass index is 28.98 kg/m².       Physical Exam  Supine in bed, head of bed elevated  No acute distress  Pleasant and interactive  Extraocular muscles intact  Lungs clear, S1, S2, I did not hear murmur  No lesions or erythema beneath the breasts    Abdomen:  Wound VAC present centrally at the umbilicus.  There is appropriate adjacent erythema but no fluctuance.  There is also is appropriate tenderness to palpation    Left lateral panniculectomy scar with adhesive in place and no erythema or tenderness to palpation    Right lateral panniculectomy scar with adhesive strip in place BUT 2 or 3 focal areas of erythema and induration.  Overt subcutaneous fluid collection not palpable but noted on scan.      Results from last 72 hours   Lab Units 05/27/25  0538 05/25/25  0133   WBC AUTO x10*3/uL 10.7 9.9   HEMOGLOBIN g/dL 10.8* 10.7*   HEMATOCRIT % 33.5* 33.1*   PLATELETS AUTO x10*3/uL 333 335     Results from last 72 hours   Lab Units 05/27/25  0538 05/25/25  0133   SODIUM mmol/L 140 138   POTASSIUM mmol/L 4.1 4.2   CHLORIDE mmol/L 108* 106   CO2 mmol/L 25 26   BUN mg/dL 24* 16   CREATININE mg/dL 0.52 0.37*   GLUCOSE mg/dL 101* 109*   CALCIUM mg/dL 8.6 8.6   ANION GAP mmol/L 11 10   EGFR mL/min/1.73m*2 >90 >90         Estimated Creatinine Clearance: 112.4 mL/min (by C-G formula based on SCr of 0.52 mg/dL).  C-Reactive Protein   Date Value Ref Range Status   05/27/2025 6.86 (H) <1.00 mg/dL Final     Sedimentation Rate   Date Value Ref Range Status   05/27/2025 33 (H) 0 - 30 mm/h Final     No results found for: \"HIV1X2\", \"HIVCONF\", \"NHTFSU3DL\"  No results found for: \"HEPCABINIT\", \"HEPCAB\", \"HCVPCRQUANT\"  Microbiology  Susceptibility data from last 90 days.  Collected Specimen Info Organism   05/25/25 Tissue/Biopsy from Wound/Tissue Morganella morganii     Pseudomonas aeruginosa     Mixed Anaerobic Bacteria         Assessment/Plan     59-year-old woman who lost a substantial amount of weight " during her conversion to a healthy lifestyle of exercise, and eating right.  Quite an inspiring story of motivation to be better for her family and friends.    On 5/12/2025 patient underwent panniculectomy and ventral abdominal wall hernia repair with bioabsorbable mesh.  SUNSHINE drains were removed 5/19/2025.  At that time there was suspicion of possible superficial ischemic changes in the umbilical area.  Patient ultimately developed drainage and then presented on 5/24/2025 with purulent malodorous drainage.  Patient had bedside I&D on 5/25/25    Patient started on IV vancomycin, metronidazole and ciprofloxacin, which have been well-tolerated.  In the past patient is also taking levofloxacin.  Preliminary cultures show mixed anaerobic bacteria, Pseudomonas aeruginosa (pan susceptible) and Morganella morganii (antibiotics pending).     patient also has right lateral flank fluid collection that likely also is infected and this correlates with exam findings of spotted erythema and induration although I did not palpate overt fluid fluctuation.  Need to consider IR guided aspirate of fluid for diagnostic culture and maybe drain placement, which would be important for source control.    Polymicrobial postoperative panniculectomy wound infection with secondary fluid collection in the lateral flank     5/19 Bedside debridement sample sent to laboratory      Culture growing mixed anaerobes, Pseudomonas and Morganella     Continue current antibiotics.  Hoping to have oral regimen to treat.     Need to strongly consider IR or ultrasound-guided fluid aspirate and/or drain placement in large subcutaneous fluid collection on the left.  This fluid is likely co-infected and drainage and/or aspirate would provide additional source control.    Recommendations  1.  Continue vancomycin, ciprofloxacin, and metronidazole  2.  Follow-up on final wound culture  3.  Consider IR  evaluation for right flank fluid aspiration (and possible drain  placement) for diagnostic culture and source control    Discussed with patient  Discussed with primary team    Pierre Villalobos MD  ID Staff  I spent 90 minutes in the professional and overall care of this patient.         [1]   Family History  Problem Relation Name Age of Onset    Breast cancer Mother Ami Go 64    Heart failure Mother Ami Go     Hypertension Mother Ami Go     Cancer Mother Ami Go     Heart failure Father Mckinley Go     Hypertension Father Mckinley Go     Breast cancer Sister Ana Beal 64    Diabetes Sister Ana Beal     Hypertension Sister Ana Beal     Heart failure Brother Jay Go     Hypertension Brother Jay Go     Seizures Child      Breast cancer Mother's Sister Sister 62    Breast cancer Niece  44    Cancer Paternal Grandfather Mckinley Go     Cancer Mother's Sister Ivania Lidia     Cancer Mother's Sister Ivania Lidia     Cancer Mother's Sister Ivania Ildia     Cancer Mother's Sister Ivania [place]     Cancer Mother's Sister Ivania Lidia

## 2025-05-27 NOTE — DISCHARGE INSTRUCTIONS
Plastic and Reconstructive Surgery Discharge Instructions  You were admitted for wound dehiscence s/p [id number] panniculectomy, ventral hernia repair, sacrospinous ligament suspension for cystocele on 05/12/2025 with Dr. Allison and Dr. Thompson     Thank you for allowing us to participate in your care and we wish you the best!    Best Regards,  [facility]  Department of Plastic and Reconstructive Surgery    Activity:  Activity as tolerated. Start walking short distances as soon as possible, as this helps to reduce swelling and lowers the chance of blood clots. No pushing, pulling or lifting objects greater than 5 pounds.     You may locally bath but may not shower due to your wound vac. Avoid soaking or submerging surgical incisions/sites or wetting wound vac dressing or device.     Surgical Site/Wound Care:  wound care recs    Wound vac  Home care set up for twice weekly vac changes.    Avoid application of creams, lotions or ointments to surgical site, no soaking or scrubbing of surgical sites.     Please do not apply ice or heat directly to the skin as feeling to the area may be diminished.    Please notify our office immediately if developing signs of infection which include increased redness, swelling, fever/chills, green/yellow drainage, or foul odor from wound. Plastic Surgery office line: [phone].          Nutrition:  You may resume a regular diet following surgery. Ensure that you are drinking an adequate amount of fluids to maintain hydration as well as consuming a diet high in protein and low in sugar. You may consider increasing your fiber intake to avoid constipation.    Do not smoke, as smoking delays healing and increases the risk of complications. Also be sure you are not around people that smoke for at least 6 weeks after surgery.  Second hand smoke is just as harmful as if you were to smoke.    Medication Instructions:  You may resume use of your home  prescribed mediations as previously directed following discharge from the hospital. If you were taking medications prior to your surgery and they are not listed on your discharge homegoing instructions medications list, consult your MD before you resume these medications.    ***You may resume your *** anticoagulant tomorrow***.    Some postoperative pain is not unusual. This is usually relieved by taking prescribed or over the counter Acetaminophen/Tylenol, Motrin/ibuprofen. In cases of severe pain, you may use prescribed *** as directed. Severe pain despite administration of pain medication must be reported to your physician.    Remember when taking Acetaminophen, do NOT exceed more than 1000 milligrams (mg) per dose or more than 4000 mg total per day. Taking too much Acetaminophen at one time can damage your liver. The maximum amount of ibuprofen in adults is 800 mg per dose or 3200 mg per day. Call your MD if you have any questions about your medications. To prevent constipation while taking narcotic pain medications, please utilize your prescribed bowel regimen, ensure that you drink plenty of water, eat fiber rich foods (a good source is fruit) and increase activity progressively.    DO NOT drive a car while utilizing narcotic pain medications and until cleared by MD at follow-up appointment. Driving or operating heavy machinery, lawnmowers or power tools while taking opiod/narcotic pain medications may impair your judgement.    Call Physician If:  Call your MD or seek immediate medical attention if you experience any of the following symptoms:  1. Fever of 101.5 (38.5 C) or greater  2. Pain not controlled with prescribed pain medications  3. Uncontrolled nausea and/or vomiting  4. Drainage or swelling around your incisions and/or surgical sites   5. Separation of incisions, or tearing of the incision line  6. Large fluid collection under or around the incision or flap sites   7. Flap discoloration (including  darkened appearance)  8. Difficulty breathing  9. Swelling, pain, heat and/or redness in your legs and/or calves  10. Inability to tolerate diet/fluid intake    Contact the plastic surgery office for any questions and/or concerns regarding the surgical incision/site.  1. [phone] if Monday-Friday (8 a.m. - 4:30 p.m.)  2. [phone] and ask for the Plastic Surgeon tona if after hours or on weekends  3. Please send a picture with any wound concerns to our plastic surgery email at [email or web address]    Follow-up/Post Discharge Appointments:  Follow-up care is a key part of your treatment and safety. It is very important that you maintain follow-up care as directed so that your surgical site heals properly and does not lead to problems. Always carry a current medication list with you and bring it to ALL healthcare Provider visits. Be sure to maintain follow up with plastic surgery at your scheduled appointment. If you are unable to keep your appointment, or need to reschedule please contact our office at [phone].

## 2025-05-27 NOTE — CARE PLAN
The patient's goals for the shift include      The clinical goals for the shift include pt will remain hemodynamicaaly stable

## 2025-05-28 ENCOUNTER — APPOINTMENT (OUTPATIENT)
Facility: CLINIC | Age: 60
End: 2025-05-28
Payer: COMMERCIAL

## 2025-05-28 ENCOUNTER — APPOINTMENT (OUTPATIENT)
Dept: UROLOGY | Facility: CLINIC | Age: 60
End: 2025-05-28
Payer: COMMERCIAL

## 2025-05-28 LAB
ABO GROUP (TYPE) IN BLOOD: NORMAL
ANTIBODY SCREEN: NORMAL
B-LACTAMASE ORGANISM ISLT: POSITIVE
BACTERIA SPEC CULT: ABNORMAL
GRAM STN SPEC: ABNORMAL
GRAM STN SPEC: ABNORMAL
RH FACTOR (ANTIGEN D): NORMAL
VANCOMYCIN SERPL-MCNC: 14.3 UG/ML (ref 5–20)

## 2025-05-28 PROCEDURE — 36415 COLL VENOUS BLD VENIPUNCTURE: CPT

## 2025-05-28 PROCEDURE — 2500000001 HC RX 250 WO HCPCS SELF ADMINISTERED DRUGS (ALT 637 FOR MEDICARE OP)

## 2025-05-28 PROCEDURE — 86901 BLOOD TYPING SEROLOGIC RH(D): CPT

## 2025-05-28 PROCEDURE — 99233 SBSQ HOSP IP/OBS HIGH 50: CPT | Performed by: INTERNAL MEDICINE

## 2025-05-28 PROCEDURE — 2500000004 HC RX 250 GENERAL PHARMACY W/ HCPCS (ALT 636 FOR OP/ED): Mod: JZ | Performed by: PHYSICIAN ASSISTANT

## 2025-05-28 PROCEDURE — 2500000004 HC RX 250 GENERAL PHARMACY W/ HCPCS (ALT 636 FOR OP/ED)

## 2025-05-28 PROCEDURE — 80202 ASSAY OF VANCOMYCIN: CPT

## 2025-05-28 PROCEDURE — 1170000001 HC PRIVATE ONCOLOGY ROOM DAILY

## 2025-05-28 PROCEDURE — 94640 AIRWAY INHALATION TREATMENT: CPT

## 2025-05-28 PROCEDURE — 99232 SBSQ HOSP IP/OBS MODERATE 35: CPT

## 2025-05-28 PROCEDURE — 2500000004 HC RX 250 GENERAL PHARMACY W/ HCPCS (ALT 636 FOR OP/ED): Mod: JZ

## 2025-05-28 RX ORDER — METRONIDAZOLE 500 MG/1
500 TABLET ORAL 3 TIMES DAILY
Status: DISCONTINUED | OUTPATIENT
Start: 2025-05-28 | End: 2025-05-28

## 2025-05-28 RX ORDER — ACETIC ACID 0.25 G/100ML
IRRIGANT IRRIGATION DAILY
Status: DISCONTINUED | OUTPATIENT
Start: 2025-05-28 | End: 2025-06-02

## 2025-05-28 RX ORDER — METRONIDAZOLE 500 MG/100ML
500 INJECTION, SOLUTION INTRAVENOUS EVERY 8 HOURS
Status: DISCONTINUED | OUTPATIENT
Start: 2025-05-28 | End: 2025-06-02

## 2025-05-28 RX ORDER — CIPROFLOXACIN 500 MG/1
750 TABLET, FILM COATED ORAL EVERY 12 HOURS SCHEDULED
Status: DISCONTINUED | OUTPATIENT
Start: 2025-05-28 | End: 2025-05-28

## 2025-05-28 RX ORDER — CIPROFLOXACIN 2 MG/ML
400 INJECTION, SOLUTION INTRAVENOUS EVERY 12 HOURS
Status: DISCONTINUED | OUTPATIENT
Start: 2025-05-28 | End: 2025-06-02

## 2025-05-28 RX ADMIN — FLUTICASONE FUROATE AND VILANTEROL TRIFENATATE 1 PUFF: 100; 25 POWDER RESPIRATORY (INHALATION) at 09:28

## 2025-05-28 RX ADMIN — METRONIDAZOLE 500 MG: 500 INJECTION, SOLUTION INTRAVENOUS at 23:30

## 2025-05-28 RX ADMIN — CIPROFLOXACIN 400 MG: 400 INJECTION, SOLUTION INTRAVENOUS at 05:50

## 2025-05-28 RX ADMIN — KETOROLAC TROMETHAMINE 30 MG: 30 INJECTION, SOLUTION INTRAMUSCULAR; INTRAVENOUS at 05:50

## 2025-05-28 RX ADMIN — VANCOMYCIN HYDROCHLORIDE 1000 MG: 1 INJECTION, SOLUTION INTRAVENOUS at 08:59

## 2025-05-28 RX ADMIN — METRONIDAZOLE 500 MG: 500 INJECTION, SOLUTION INTRAVENOUS at 08:59

## 2025-05-28 RX ADMIN — CYCLOBENZAPRINE HYDROCHLORIDE 5 MG: 10 TABLET, FILM COATED ORAL at 11:34

## 2025-05-28 RX ADMIN — CIPROFOLXACIN 750 MG: 500 TABLET ORAL at 11:51

## 2025-05-28 RX ADMIN — CYCLOBENZAPRINE HYDROCHLORIDE 5 MG: 10 TABLET, FILM COATED ORAL at 21:29

## 2025-05-28 RX ADMIN — HYDROCODONE BITARTRATE AND ACETAMINOPHEN 1 TABLET: 5; 325 TABLET ORAL at 15:26

## 2025-05-28 RX ADMIN — MONTELUKAST 10 MG: 10 TABLET, FILM COATED ORAL at 08:59

## 2025-05-28 RX ADMIN — HYDROCODONE BITARTRATE AND ACETAMINOPHEN 1 TABLET: 5; 325 TABLET ORAL at 08:59

## 2025-05-28 RX ADMIN — HYDROCODONE BITARTRATE AND ACETAMINOPHEN 1 TABLET: 5; 325 TABLET ORAL at 21:27

## 2025-05-28 RX ADMIN — METRONIDAZOLE 500 MG: 500 TABLET ORAL at 11:51

## 2025-05-28 ASSESSMENT — COGNITIVE AND FUNCTIONAL STATUS - GENERAL
DAILY ACTIVITIY SCORE: 24
MOBILITY SCORE: 24

## 2025-05-28 ASSESSMENT — PAIN SCALES - GENERAL
PAINLEVEL_OUTOF10: 0 - NO PAIN
PAINLEVEL_OUTOF10: 0 - NO PAIN
PAINLEVEL_OUTOF10: 5 - MODERATE PAIN
PAINLEVEL_OUTOF10: 5 - MODERATE PAIN
PAINLEVEL_OUTOF10: 2
PAINLEVEL_OUTOF10: 0 - NO PAIN
PAINLEVEL_OUTOF10: 0 - NO PAIN
PAINLEVEL_OUTOF10: 6
PAINLEVEL_OUTOF10: 8

## 2025-05-28 ASSESSMENT — PAIN - FUNCTIONAL ASSESSMENT
PAIN_FUNCTIONAL_ASSESSMENT: 0-10

## 2025-05-28 NOTE — PROGRESS NOTES
Vancomycin Dosing by Pharmacy- Cessation of Therapy    Consult to pharmacy for vancomycin dosing has been discontinued by the prescriber, pharmacy will sign off at this time.    Please call pharmacy if there are further questions or re-enter a consult if vancomycin is resumed.     Edwin Llanos, [facility]

## 2025-05-28 NOTE — CARE PLAN
Problem: Pain - Adult  Goal: Verbalizes/displays adequate comfort level or baseline comfort level  Outcome: Progressing   The patient's goals for the shift include      The clinical goals for the shift include patient to remain hemodynamically stable

## 2025-05-28 NOTE — PROGRESS NOTES
05/28/25 1400   Discharge Planning   Living Arrangements Children   Support Systems Family members;Children   Assistance Needed none   Type of Residence Private residence   Who is requesting discharge planning? Provider   Home or Post Acute Services In home services   Type of Home Care Services Home nursing visits   Expected Discharge Disposition Home H     Care Transitions Note    Plan per Medical/Surgical Team: Pt had [id number] panniculectomy and ventral hernia repair on 5/12/25. Admitted for I&D, IV antibiotics, wound vac placed.  Status: Inpatient  Payor Source:[employer]  Discharge disposition: [facility] SN wound vac, (hope to transition to po abx)  Expected date of discharge: 5/30/25  Barriers: None  PCP / Primary Oncologist: Jacque Fuchs MD  Preferred Pharmacy: [facility], [address] [place]   Preferred home care agency: [facility]  Transportation at discharge: Sister/sarah    TCC Assessment Note: Met with patient and introduced myself as care coordinator and member of the care transitions team for discharge planning. Patient lives alone, 10 yo grandson lives with patient Mon-Friday. Patient is independent, does not use assistive devices. Pt identified sister as primary support and will be teachable caregiver if needed. Patient is agreeable to  SN for wound vac. HC is [facility]. Demographics and contacts verified. Care transitions will continue to follow for discharge needs.                                                 Home medical equipment: None  DME: None  O2: No  Dialysis: No  Diabetes/Supplies needed: No    Jaylyn Pollock RN  Transitional Care Coordinator (TCC)  [phone]

## 2025-05-28 NOTE — PROGRESS NOTES
"  Department of Plastic and Reconstructive Surgery  Daily Progress Note    Patient Name: Alisha Springer  MRN: [id number]  Date:  05/28/25     Subjective  Found resting in bed comfortably this AM. Reports pain is well controlled however is experiencing tenderness in right pelvic region. Reports nausea experienced yesterday has since resolved. Otherwise, she is eating, drinking and voiding spontaneously. Denies any fever, chills, night sweats, CP, SOB, palpitations, vomiting, diarrhea, constipation or dysuria.      Overnight Events  None     Objective    Vital Signs  /70 (BP Location: Right arm, Patient Position: Lying)   Pulse 78   Temp 36.6 °C (97.9 °F) (Temporal)   Resp 18   Ht 1.6 m (5' 3\")   Wt 74.2 kg (163 lb 9.3 oz)   SpO2 95%   BMI 28.98 kg/m²      Physical Exam  Constitutional:       Appearance: Normal appearance. She is normal weight.   HENT:      Head: Normocephalic and atraumatic.      Nose: Nose normal.      Mouth/Throat:      Mouth: Mucous membranes are moist.      Pharynx: Oropharynx is clear.   Eyes:      Extraocular Movements: Extraocular movements intact.      Conjunctiva/sclera: Conjunctivae normal.      Pupils: Pupils are equal, round, and reactive to light.   Cardiovascular:      Rate and Rhythm: Normal rate and regular rhythm.   Pulmonary:      Effort: Pulmonary effort is normal.   Abdominal:      General: Abdomen is flat.      Palpations: Abdomen is soft.   Musculoskeletal:         General: Normal range of motion.      Cervical back: Normal range of motion.   Skin:     General: Skin is warm and dry.      Capillary Refill: Capillary refill takes less than 2 seconds.   Neurological:      General: No focal deficit present.      Mental Status: She is alert and oriented to person, place, and time. Mental status is at baseline.   Psychiatric:         Mood and Affect: Mood normal.         Behavior: Behavior normal.         Thought Content: Thought content normal.      Focused exam: Wound vac " involving the umbilicus and extending inferiorly to the central lower quadrant abdominal incision is suctioning appropriately at -125 mm Hg medium, continuous suction. Dakins solution is irrigating appropriately with no leaks or malfunctions. The surrounding erythema was previous markings has reduced compared to previous exams. Remaining abdomen is soft, supple with no swelling, skin changes or additional wound breakdown. Remaining lower abdominal incision is c/d/I.     Diagnostics   Results for orders placed or performed during the hospital encounter of 05/24/25 (from the past 24 hours)   Vancomycin   Result Value Ref Range    Vancomycin 14.3 5.0 - 20.0 ug/mL     Imaging  CT abdomen pelvis w IV contrast  Result Date: 5/24/2025  IMPRESSION: Right renal cyst not excluded. Appendix not identified. Moderate stool burden. Soft tissue stranding in the anterior abdominal wall with air bubbles. Large fluid collection along the right lateral aspect of the pelvis.   : VISH   Transcribe Date/Time: May 24 2025  6:27P Dictated by : JORDI BHATIA MD This examination was interpreted and the report reviewed and electronically signed by: JORDI BHATIA MD on May 24 2025  6:39PM  EST      Cardiology, Vascular, and Other Imaging  CT abdomen pelvis w IV contrast  Result Date: 5/24/2025  * * *Final Report* * * DATE OF EXAM: May 24 2025  4:04PM   [facility]   0530  -  CT ABD/PEL W IVCON  / ACCESSION #  [id number] PROCEDURE REASON: Abdominal abscess/infection suspected      * * * * Physician Interpretation * * * *  EXAMINATION:  CT ABDOMEN AND PELVIS WITH IV CONTRAST CLINICAL HISTORY: Lower abdominal pain.  A recent hernia and bladder prolapse surgery. TECHNIQUE: CT of the abdomen and pelvis was performed using standard technique, scanning from just above the dome of the diaphragm to the symphysis pubis. MQ:  CTAP_3 Contrast: IV:  100 ml of Omnipaque 350 CT Radiation dose: Integrated Dose-length product (DLP) for this visit =   628  mGy*cm. CT Dose Reduction Employed: Automated exposure control(AEC) and iterative recon COMPARISON: CT abdomen pelvis on 01/18/2025 RESULT: Liver: A new low-attenuation lesions or cysts visualized measuring up to 7 mm, unchanged. Biliary: No bile duct dilation.  No CT evidence of gallbladder stones. Spleen: No mass. No splenomegaly. Pancreas: No mass or duct dilation. Adrenals: No mass. Kidneys: There is a questionable parapelvic cyst in the right kidney.  No hydroureteronephrosis. GI tract: No dilation or wall thickening. The appendix is not identified.  No diverticulitis.  Moderate amount of stool and gas noted in the large bowel loops. Lymph nodes: No abdominal or pelvic lymphadenopathy. Mesentery/Peritoneum: No ascites or mass or free abdominal air. Retroperitoneum: No mass.   Vasculature: The celiac artery, SMA, YOVANI, portal veins and hepatic veins are patent.  No AAA. Pelvis: No mass, ascites or fluid collection. The uterus is surgically absent. Bones/Soft Tissues: Soft tissue stranding seen in the anterior aspect of the abdominal wall, with multiple air bubbles.  Also noted is a 3.6 x 8.3 cm fluid collection along the right lateral aspect of the pelvis, series 2 image 71.  Hernia mesh visualized.  The spine shows mild degenerative changes. Lower thorax: No pleural effusions.  Dependent atelectasis seen in the bilateral lower lobes.  Patient is status post fundoplication. Localizer images: No additional findings.    IMPRESSION: Right renal cyst not excluded. Appendix not identified. Moderate stool burden. Soft tissue stranding in the anterior abdominal wall with air bubbles. Large fluid collection along the right lateral aspect of the pelvis.   [facility]   Transcribe Date/Time: May 24 2025  6:27P Dictated by : JORDI BHATIA MD This examination was interpreted and the report reviewed and electronically signed by: JORDI BHATIA MD on May 24 2025  6:39PM  EST        Current Medications  Scheduled  medications  Scheduled Medications[1]  Continuous medications  Continuous Medications[2]  PRN medications  PRN Medications[3]     Assessment  Alisha Springer is a 59 y.o. female with a past medical history of asthma, GERD s/p fundoplication, depression, endometriosis, nephrolithiasis, rectocele, female cystocele who is s/p [id number] panniculectomy, ventral hernia repair, sacrospinous ligament suspension for cystocele on 05/12/2025 with Dr. Allison and Dr. Thompson who presented to the ED as a transfer from [facility] for admission under plastic surgery for surgical wound infection of her abdomen. At [facility], CT abdomen pelvis showed soft tissue stranding in the abdominal wall with multiple air bubbles, and large fluid collection along the right lateral aspect of the pelvis. On arrival, patient was hemodynamically stable. She has complaints of extreme nausea without vomiting, significant pain at her abdomen, fever and chills overnight. AN excision debridement at bedside with application of irrigating wound vac was placed. She was admitted under plastic surgery service for IV antibiotics and continued wound care with irrigating wound vac.     Plan/Recommendations  S/P [id number] panniculectomy and ventral hernia repair with Dr. Allison 05/12/2025  #Wound infection  - Plan for OR 5/29 for abdominal wall debridement/washout with Dr. Schmitz       ·  NPO at 0000 5/29       ·  T&S ordered for 5/29 AM  - Bedside wound cultures obtained 5/25:       ·  4+ abundant morganella morganii, 2+ Pseudomonas aeruginosa, 4+ Abundant polymorphonuclear leukocytes, 4+ abundant mixed gram positive and gram negative bacteria  - ID consulted, appreciate recs       ·  DC vanc (5/28)       · IV Flagyl 500 TID       · IV Cipro 400 BID  - Continue to monitor labs, CRP 6.86  05/27        ·  ESR/CRP ordered AM 5/29  - Irrigating wound vac removed 5/28       ·  Replaced with BID WTD Acetic acid dressing changes              · Plastics to do AM  dressing, nursing to perform PM dressing    - Vital signs Q4    #Acute pain  - Continue norco 5-325 mg Q6 hours PRN (severe pain)  - Continue Naproxen 500 mg BID PRN (mild pain)  - Continue flexeril 5mg TID PRN for muscle spasms  - Zofran 4mg PRN for nausea/vomiting 2/2 pain medications     #Asthma  - Continue home Breo Ellipta and Singulair  - Stable, consult RT as needed    F: none, encourage PO intake  E: monitor and replete PRN  N: regular diet  GI: Colace BID   Dvt ppx: lovenox 40mg daily, SCD's, encourage ambulation     - Patient/plan discussed with Dr. Schmitz    Disposition: Continue care on RNF. Will remain inpatient with plans for OR 5/29 for debridement/washout of abdominal wound, continued antibiotic therapy. Further dispo pending OR tomorrow    SKYLAR Stein-CNP  Plastic and Reconstructive Surgery   Available via Epic chat, pager: [phone] or team phones: [phone]            [1] acetic acid, , irrigation, Daily  ciprofloxacin, 750 mg, oral, q12h DIONI  enoxaparin, 40 mg, subcutaneous, q24h  fluticasone furoate-vilanteroL, 1 puff, inhalation, Daily  metroNIDAZOLE, 500 mg, oral, TID  montelukast, 10 mg, oral, q AM  sodium hypochlorite, , irrigation, Daily     [2]    [3] PRN medications: cyclobenzaprine, diphenhydrAMINE, HYDROcodone-acetaminophen, ondansetron

## 2025-05-29 ENCOUNTER — APPOINTMENT (OUTPATIENT)
Facility: CLINIC | Age: 60
End: 2025-05-29
Payer: COMMERCIAL

## 2025-05-29 ENCOUNTER — ANESTHESIA (OUTPATIENT)
Dept: OPERATING ROOM | Facility: HOSPITAL | Age: 60
End: 2025-05-29
Payer: COMMERCIAL

## 2025-05-29 ENCOUNTER — ANESTHESIA EVENT (OUTPATIENT)
Dept: OPERATING ROOM | Facility: HOSPITAL | Age: 60
End: 2025-05-29
Payer: COMMERCIAL

## 2025-05-29 LAB
ALBUMIN SERPL BCP-MCNC: 2.7 G/DL (ref 3.4–5)
ANION GAP SERPL CALC-SCNC: 12 MMOL/L (ref 10–20)
BUN SERPL-MCNC: 18 MG/DL (ref 6–23)
CALCIUM SERPL-MCNC: 8.3 MG/DL (ref 8.6–10.6)
CHLORIDE SERPL-SCNC: 107 MMOL/L (ref 98–107)
CO2 SERPL-SCNC: 24 MMOL/L (ref 21–32)
CREAT SERPL-MCNC: 0.55 MG/DL (ref 0.5–1.05)
CRP SERPL-MCNC: 4.55 MG/DL
EGFRCR SERPLBLD CKD-EPI 2021: >90 ML/MIN/1.73M*2
ERYTHROCYTE [DISTWIDTH] IN BLOOD BY AUTOMATED COUNT: 13.4 % (ref 11.5–14.5)
ERYTHROCYTE [SEDIMENTATION RATE] IN BLOOD BY WESTERGREN METHOD: 21 MM/H (ref 0–30)
GLUCOSE BLD MANUAL STRIP-MCNC: 139 MG/DL (ref 74–99)
GLUCOSE SERPL-MCNC: 97 MG/DL (ref 74–99)
HCT VFR BLD AUTO: 34.7 % (ref 36–46)
HGB BLD-MCNC: 11.3 G/DL (ref 12–16)
MCH RBC QN AUTO: 30.2 PG (ref 26–34)
MCHC RBC AUTO-ENTMCNC: 32.6 G/DL (ref 32–36)
MCV RBC AUTO: 93 FL (ref 80–100)
NRBC BLD-RTO: 0 /100 WBCS (ref 0–0)
PHOSPHATE SERPL-MCNC: 4.4 MG/DL (ref 2.5–4.9)
PLATELET # BLD AUTO: 359 X10*3/UL (ref 150–450)
POTASSIUM SERPL-SCNC: 4 MMOL/L (ref 3.5–5.3)
RBC # BLD AUTO: 3.74 X10*6/UL (ref 4–5.2)
SODIUM SERPL-SCNC: 139 MMOL/L (ref 136–145)
WBC # BLD AUTO: 11.3 X10*3/UL (ref 4.4–11.3)

## 2025-05-29 PROCEDURE — 82947 ASSAY GLUCOSE BLOOD QUANT: CPT

## 2025-05-29 PROCEDURE — 2500000004 HC RX 250 GENERAL PHARMACY W/ HCPCS (ALT 636 FOR OP/ED): Mod: JZ

## 2025-05-29 PROCEDURE — 85027 COMPLETE CBC AUTOMATED: CPT

## 2025-05-29 PROCEDURE — 85652 RBC SED RATE AUTOMATED: CPT

## 2025-05-29 PROCEDURE — 2500000001 HC RX 250 WO HCPCS SELF ADMINISTERED DRUGS (ALT 637 FOR MEDICARE OP)

## 2025-05-29 PROCEDURE — 86140 C-REACTIVE PROTEIN: CPT

## 2025-05-29 PROCEDURE — 99232 SBSQ HOSP IP/OBS MODERATE 35: CPT

## 2025-05-29 PROCEDURE — 80069 RENAL FUNCTION PANEL: CPT

## 2025-05-29 PROCEDURE — 0J9C3ZX DRAINAGE OF PELVIC REGION SUBCUTANEOUS TISSUE AND FASCIA, PERCUTANEOUS APPROACH, DIAGNOSTIC: ICD-10-PCS | Performed by: RADIOLOGY

## 2025-05-29 PROCEDURE — 1170000001 HC PRIVATE ONCOLOGY ROOM DAILY

## 2025-05-29 PROCEDURE — 36415 COLL VENOUS BLD VENIPUNCTURE: CPT

## 2025-05-29 RX ORDER — HYDROMORPHONE HYDROCHLORIDE 1 MG/ML
INJECTION, SOLUTION INTRAMUSCULAR; INTRAVENOUS; SUBCUTANEOUS CONTINUOUS PRN
Status: DISCONTINUED | OUTPATIENT
Start: 2025-05-29 | End: 2025-05-30 | Stop reason: HOSPADM

## 2025-05-29 RX ORDER — VANCOMYCIN HYDROCHLORIDE 1 G/200ML
1000 INJECTION, SOLUTION INTRAVENOUS EVERY 12 HOURS
Status: DISCONTINUED | OUTPATIENT
Start: 2025-05-29 | End: 2025-06-03

## 2025-05-29 RX ORDER — MIDAZOLAM HYDROCHLORIDE 1 MG/ML
INJECTION INTRAMUSCULAR; INTRAVENOUS CONTINUOUS PRN
Status: DISCONTINUED | OUTPATIENT
Start: 2025-05-29 | End: 2025-05-30 | Stop reason: HOSPADM

## 2025-05-29 RX ORDER — FENTANYL CITRATE 50 UG/ML
INJECTION, SOLUTION INTRAMUSCULAR; INTRAVENOUS CONTINUOUS PRN
Status: DISCONTINUED | OUTPATIENT
Start: 2025-05-29 | End: 2025-05-30 | Stop reason: HOSPADM

## 2025-05-29 RX ORDER — VANCOMYCIN HYDROCHLORIDE 1 G/20ML
INJECTION, POWDER, LYOPHILIZED, FOR SOLUTION INTRAVENOUS DAILY PRN
Status: DISCONTINUED | OUTPATIENT
Start: 2025-05-29 | End: 2025-06-03

## 2025-05-29 RX ORDER — PROPOFOL 10 MG/ML
INJECTION, EMULSION INTRAVENOUS CONTINUOUS PRN
Status: DISCONTINUED | OUTPATIENT
Start: 2025-05-29 | End: 2025-05-30 | Stop reason: HOSPADM

## 2025-05-29 RX ADMIN — CIPROFLOXACIN 400 MG: 400 INJECTION, SOLUTION INTRAVENOUS at 23:30

## 2025-05-29 RX ADMIN — HYDROCODONE BITARTRATE AND ACETAMINOPHEN 1 TABLET: 5; 325 TABLET ORAL at 03:55

## 2025-05-29 RX ADMIN — CYCLOBENZAPRINE HYDROCHLORIDE 5 MG: 10 TABLET, FILM COATED ORAL at 03:55

## 2025-05-29 RX ADMIN — MONTELUKAST 10 MG: 10 TABLET, FILM COATED ORAL at 09:24

## 2025-05-29 RX ADMIN — HYDROCODONE BITARTRATE AND ACETAMINOPHEN 1 TABLET: 5; 325 TABLET ORAL at 21:28

## 2025-05-29 RX ADMIN — CIPROFLOXACIN 400 MG: 400 INJECTION, SOLUTION INTRAVENOUS at 00:20

## 2025-05-29 RX ADMIN — METRONIDAZOLE 500 MG: 500 INJECTION, SOLUTION INTRAVENOUS at 15:12

## 2025-05-29 RX ADMIN — CIPROFLOXACIN 400 MG: 400 INJECTION, SOLUTION INTRAVENOUS at 12:11

## 2025-05-29 RX ADMIN — VANCOMYCIN HYDROCHLORIDE 1000 MG: 1 INJECTION, SOLUTION INTRAVENOUS at 09:24

## 2025-05-29 RX ADMIN — METRONIDAZOLE 500 MG: 500 INJECTION, SOLUTION INTRAVENOUS at 06:31

## 2025-05-29 RX ADMIN — ENOXAPARIN SODIUM 40 MG: 100 INJECTION SUBCUTANEOUS at 23:30

## 2025-05-29 RX ADMIN — VANCOMYCIN HYDROCHLORIDE 1000 MG: 1 INJECTION, SOLUTION INTRAVENOUS at 21:21

## 2025-05-29 RX ADMIN — CYCLOBENZAPRINE HYDROCHLORIDE 5 MG: 10 TABLET, FILM COATED ORAL at 12:11

## 2025-05-29 RX ADMIN — HYDROCODONE BITARTRATE AND ACETAMINOPHEN 1 TABLET: 5; 325 TABLET ORAL at 12:11

## 2025-05-29 RX ADMIN — CYCLOBENZAPRINE HYDROCHLORIDE 5 MG: 10 TABLET, FILM COATED ORAL at 21:28

## 2025-05-29 SDOH — HEALTH STABILITY: MENTAL HEALTH: CURRENT SMOKER: 0

## 2025-05-29 ASSESSMENT — COGNITIVE AND FUNCTIONAL STATUS - GENERAL
DAILY ACTIVITIY SCORE: 24
MOBILITY SCORE: 24

## 2025-05-29 ASSESSMENT — COLUMBIA-SUICIDE SEVERITY RATING SCALE - C-SSRS
6. HAVE YOU EVER DONE ANYTHING, STARTED TO DO ANYTHING, OR PREPARED TO DO ANYTHING TO END YOUR LIFE?: NO
1. IN THE PAST MONTH, HAVE YOU WISHED YOU WERE DEAD OR WISHED YOU COULD GO TO SLEEP AND NOT WAKE UP?: NO
2. HAVE YOU ACTUALLY HAD ANY THOUGHTS OF KILLING YOURSELF?: NO

## 2025-05-29 ASSESSMENT — PAIN - FUNCTIONAL ASSESSMENT
PAIN_FUNCTIONAL_ASSESSMENT: 0-10
PAIN_FUNCTIONAL_ASSESSMENT: UNABLE TO SELF-REPORT
PAIN_FUNCTIONAL_ASSESSMENT: 0-10

## 2025-05-29 ASSESSMENT — PAIN SCALES - GENERAL
PAINLEVEL_OUTOF10: 6
PAINLEVEL_OUTOF10: 7
PAINLEVEL_OUTOF10: 3
PAINLEVEL_OUTOF10: 0 - NO PAIN
PAINLEVEL_OUTOF10: 0 - NO PAIN
PAINLEVEL_OUTOF10: 9
PAINLEVEL_OUTOF10: 4
PAINLEVEL_OUTOF10: 7
PAINLEVEL_OUTOF10: 1
PAINLEVEL_OUTOF10: 0 - NO PAIN

## 2025-05-29 NOTE — ANESTHESIA PREPROCEDURE EVALUATION
"Patient: Alisha Springer    Procedure Information       Date/Time: 05/29/25 1044    Procedure: DEBRIDEMENT, ABDOMINAL WALL    [facility]    Surgeons: Klarissa Schmitz MD          59F s/p panniculectomy and ventral abdominal wall hernia repair 5/12 with abdominal wall infection undergoing procedure listed above    On scheduled Cipro/Flagyl/Vanc    Relevant Problems   Pulmonary   (+) Asthma      GI   (+) Gastroesophageal reflux disease      ID   (+) Wound infection     Visit Vitals  /78 (BP Location: Right arm, Patient Position: Lying)   Pulse 78   Temp 36.9 °C (98.4 °F) (Temporal)   Resp 16   Ht 1.6 m (5' 3\")   Wt 69.4 kg (153 lb)   SpO2 98%   BMI 27.10 kg/m²   OB Status Postmenopausal   Smoking Status Never   BSA 1.76 m²        Lab Results   Component Value Date    ALBUMIN 2.7 (L) 05/29/2025    ALT 24 09/20/2018    AST 24 09/20/2018    BUN 18 05/29/2025    CALCIUM 8.3 (L) 05/29/2025     05/29/2025    CO2 24 05/29/2025    CREATININE 0.55 05/29/2025    HCT 34.7 (L) 05/29/2025    HGB 11.3 (L) 05/29/2025    HGBA1C 5.4 05/22/2021    PHOS 4.4 05/29/2025     05/29/2025    K 4.0 05/29/2025     05/29/2025    WBC 11.3 05/29/2025       Scheduled medications  Scheduled Medications[1]  Continuous medications  Continuous Medications[2]  PRN medications  PRN Medications[3]    Medications Ordered Prior to Encounter[4]       Clinical information reviewed:   Tobacco  Allergies  Meds   Med Hx  Surg Hx  OB Status  Fam Hx  Soc   Hx        NPO Detail:  No data recorded     Physical Exam    Airway  Mallampati: I  TM distance: >3 FB  Neck ROM: full  Mouth opening: 3 or more finger widths     Cardiovascular - normal exam   Dental - normal exam     Pulmonary - normal exam   Abdominal Abdomen: soft             Anesthesia Plan    History of general anesthesia?: yes  History of complications of general anesthesia?: no    ASA 2     general     The patient is not a current " smoker.    intravenous induction   Postoperative pain plan includes opioids.  Trial extubation is planned.  Anesthetic plan and risks discussed with patient.  Use of blood products discussed with patient who consented to blood products.    Plan discussed with resident and attending.           [1] acetic acid, , irrigation, Daily  ciprofloxacin, 400 mg, intravenous, q12h  [Held by provider] enoxaparin, 40 mg, subcutaneous, q24h  fluticasone furoate-vilanteroL, 1 puff, inhalation, Daily  metroNIDAZOLE, 500 mg, intravenous, q8h  montelukast, 10 mg, oral, q AM  vancomycin, 1,000 mg, intravenous, q12h  [2]    [3] PRN medications: cyclobenzaprine, diphenhydrAMINE, HYDROcodone-acetaminophen, ondansetron, vancomycin  [4]   No current facility-administered medications on file prior to encounter.     Current Outpatient Medications on File Prior to Encounter   Medication Sig Dispense Refill    clobetasol (Temovate) 0.05 % cream Apply to affected area twice daily as needed. No longer than 5 days in a row per week      acetaminophen (Tylenol) 500 mg tablet Take 2 tablets (1,000 mg) by mouth every 6 hours if needed for mild pain (1 - 3). 30 tablet 0    ascorbic acid (Vitamin C) 500 mg tablet Take 1 tablet (500 mg) by mouth once daily.      chlorhexidine (Peridex) 0.12 % solution 15 milliliter(s) orally once a day for 2 doses 15 ml  the night before surgery and 15 ml morning of surgery - swish for 30 seconds -DO NOT SWALLOW, SPIT OUT (Patient not taking: Reported on 2025) 473 mL 0    cholecalciferol (Vitamin D-3) 25 MCG (1000 UT) tablet Take 1 tablet (1,000 Units) by mouth once daily.      [] clindamycin (Cleocin) 300 mg capsule Take 1 capsule (300 mg) by mouth 3 times a day for 10 days. (Patient not taking: Reported on 2025) 30 capsule 0    cyanocobalamin (Vitamin B-12) 1,000 mcg tablet Take 1 tablet (1,000 mcg) by mouth once daily.      cyclobenzaprine (Flexeril) 5 mg tablet Take 1 tablet (5 mg) by mouth 3 times  a day as needed for muscle spasms. 30 tablet 2    docusate sodium (Colace) 100 mg capsule Take 1 capsule (100 mg) by mouth 2 times a day. (Patient not taking: Reported on 5/25/2025) 20 capsule 0    HYDROcodone-acetaminophen (Norco) 5-325 mg tablet Take 1 tablet by mouth every 6 hours if needed for severe pain (7 - 10) for up to 7 days. 10 tablet 0    ibuprofen 600 mg tablet Take 1 tablet (600 mg) by mouth every 6 hours if needed for mild pain (1 - 3). (Patient not taking: Reported on 5/25/2025) 30 tablet 0    mometasone-formoterol (Dulera) 100-5 mcg/actuation inhaler Inhale 2 puffs 2 times a day. Rinse mouth with water after use to reduce aftertaste and incidence of candidiasis. Do not swallow.      montelukast (Singulair) 10 mg tablet Take 1 tablet (10 mg) by mouth once daily in the morning.      naproxen (Naprosyn) 500 mg tablet Take 1 tablet (500 mg) by mouth 2 times a day as needed for mild pain (1 - 3).      ondansetron (Zofran) 4 mg tablet Take 1 tablet (4 mg) by mouth every 8 hours if needed for nausea or vomiting for up to 7 days. 20 tablet 0    phentermine (Adipex-P) 37.5 mg tablet Take 1 tablet (37.5 mg) by mouth once daily in the morning. Take before meals.      potassium gluconate 595 mg (99 mg) tablet Take 1 tablet by mouth once daily.      zinc gluconate 50 mg tablet Take 1 tablet by mouth once daily.      [DISCONTINUED] acetic acid 0.25 % irrigation Apply damp to dry dressing using acetic acid solution 500 mL 12    [DISCONTINUED] clindamycin (Cleocin T) 1 % lotion Apply 1 Application topically once daily as needed (affected area).      [DISCONTINUED] collagenase (SantyL) 250 unit/gram ointment Apply topically once daily. 90 g 0    [DISCONTINUED] Hibiclens 4 % external liquid Apply 1 Application topically once daily as needed. (Patient not taking: Reported on 5/19/2025)      [DISCONTINUED] HYDROcodone-acetaminophen (Norco) 5-325 mg tablet Take 1 tablet by mouth every 6 hours if needed for severe pain  (7 - 10). DO NOT TAKE WITH TYLENOL 20 tablet 0

## 2025-05-29 NOTE — CONSULTS
Vancomycin Dosing by Pharmacy- INITIAL    Alisha Springer is a 59 y.o. year old female who Pharmacy has been consulted (continuation) for vancomycin dosing for surgical wound infection . Based on the patient's indication and renal status this patient will be dosed based on a goal AUC of 400-600.     Renal function is currently stable.    Visit Vitals  /67   Pulse 66   Temp 37.2 °C (99 °F) (Temporal)   Resp 18        Lab Results   Component Value Date    CREATININE 0.52 2025    CREATININE 0.37 (L) 2025    CREATININE 0.78 2025    CREATININE 0.57 2024        Patient weight is as follows:   Vitals:    25 0633   Weight: 69.4 kg (153 lb)       Cultures:  Susceptibility data for the encounter in last 14 days.  Collected Specimen Info Organism Amoxicillin/Clavulanate Ampicillin Ampicillin/Sulbactam Aztreonam Cefazolin Cefepime Ceftazidime Ceftriaxone Ciprofloxacin Ertapenem Gentamicin Imipenem Levofloxacin Meropenem   25 Tissue/Biopsy from Wound/Tissue Pseudomonas aeruginosa     S   S  S   S     S      Morganella morganii  R  R  R   R    S  S  S  I  I  S  S     Mixed Anaerobic Bacteria                   Collected Specimen Info Organism Piperacillin/Tazobactam Tobramycin Trimethoprim/Sulfamethoxazole   25 Tissue/Biopsy from Wound/Tissue Pseudomonas aeruginosa  S  S      Morganella morganii  S  S  S     Mixed Anaerobic Bacteria            I/O last 3 completed shifts:  In: 1240 (17.9 mL/kg) [P.O.:940; IV Piggyback:300]  Out: 2800 (40.3 mL/kg) [Urine:2300 (0.9 mL/kg/hr); Drains:500]  Weight: 69.4 kg   I/O during current shift:  No intake/output data recorded.    Temp (24hrs), Av.8 °C (98.3 °F), Min:36.4 °C (97.6 °F), Max:37.2 °C (99 °F)         Assessment/Plan     Patient will continue maintenance therapy of 1000mg q12h for pAUCss 502    This dosing regimen is predicted by [employer] to result in the following pharmacokinetic parameters:  Loading dose: N/A  Regimen: 1000 mg IV  every 12 hours.  Start time: 07:49 on 05/29/2025  Exposure target: AUC24 (range) 400-600 mg/L.hr   [id number]: 477 mg/L.hr  AUC24,ss: 502 mg/L.hr  Probability of AUC24 > 400: 98 %  Ctrough,ss: 14.2 mg/L  Probability of Ctrough,ss > 20: 1 %  Follow-up level will be ordered on 5/30 at 1400 unless clinically indicated sooner.  Will continue to monitor renal function daily while on vancomycin and order serum creatinine at least every 48 hours if not already ordered.  Follow for continued vancomycin needs, clinical response, and signs/symptoms of toxicity.       JANESSA OAKES

## 2025-05-29 NOTE — PROGRESS NOTES
"  Department of Plastic and Reconstructive Surgery  Daily Progress Note    Patient Name: Alisha Springer  MRN: [id number]  Date:  05/29/25     Subjective  Found resting in bed comfortably on exam. Reports tenderness to midline abdomen wound, particularly tender to L aspect of wound. Patient aware of plan for IR to aspirate abdominal fluid collection and obtain cultures. Continuing BID Acetic Acid dressing changes to abdominal wound. Otherwise, she is eating, drinking and voiding spontaneously. Denies any fever, chills, night sweats, CP, SOB, palpitations, vomiting, diarrhea, constipation or dysuria.      Overnight Events  None     Objective    Vital Signs  /71   Pulse 83   Temp 37.1 °C (98.8 °F) (Temporal)   Resp 16   Ht 1.6 m (5' 3\")   Wt 69.4 kg (153 lb)   SpO2 100%   BMI 27.10 kg/m²      Physical Exam:  Constitutional: A&Ox3, calm and cooperative, NAD.  Eyes: PERRL, EOMI  ENMT: Moist mucous membranes, no apparent injuries or lesions.  Head/Neck: NC/AT.   Cardiovascular: Normal rate and regular rhythm. 2+ equal pulses of the distal extremities.  Respiratory/Thorax: Regular respirations on RA. Good symmetric chest expansion.   Gastrointestinal: Abdomen soft, tender surrounding midline wound, pink granulation tissue with yellow/green fibrinous exudate present in wound bed, acetic acid WTD dressing removed and replaced on exam. Improving surrounding erythema compared to previous exams. Remaining lower abdominal incision is c/d/I with sylke dressing in place.   Genitourinary: voiding independently   Extremities: No peripheral edema. Moving all 4 extremities actively.   Neurological: A&Ox3.   Psychological: Appropriate mood and behavior.      Diagnostics   Results for orders placed or performed during the hospital encounter of 05/24/25 (from the past 24 hours)   Type and Screen   Result Value Ref Range    ABO TYPE O     Rh TYPE NEG     ANTIBODY SCREEN NEG    CBC   Result Value Ref Range    WBC 11.3 4.4 - 11.3 " x10*3/uL    nRBC 0.0 0.0 - 0.0 /100 WBCs    RBC 3.74 (L) 4.00 - 5.20 x10*6/uL    Hemoglobin 11.3 (L) 12.0 - 16.0 g/dL    Hematocrit 34.7 (L) 36.0 - 46.0 %    MCV 93 80 - 100 fL    MCH 30.2 26.0 - 34.0 pg    MCHC 32.6 32.0 - 36.0 g/dL    RDW 13.4 11.5 - 14.5 %    Platelets 359 150 - 450 x10*3/uL   Renal Function Panel   Result Value Ref Range    Glucose 97 74 - 99 mg/dL    Sodium 139 136 - 145 mmol/L    Potassium 4.0 3.5 - 5.3 mmol/L    Chloride 107 98 - 107 mmol/L    Bicarbonate 24 21 - 32 mmol/L    Anion Gap 12 10 - 20 mmol/L    Urea Nitrogen 18 6 - 23 mg/dL    Creatinine 0.55 0.50 - 1.05 mg/dL    eGFR >90 >60 mL/min/1.73m*2    Calcium 8.3 (L) 8.6 - 10.6 mg/dL    Phosphorus 4.4 2.5 - 4.9 mg/dL    Albumin 2.7 (L) 3.4 - 5.0 g/dL   C-reactive protein   Result Value Ref Range    C-Reactive Protein 4.55 (H) <1.00 mg/dL   Sedimentation rate, automated   Result Value Ref Range    Sedimentation Rate 21 0 - 30 mm/h     Imaging  CT abdomen pelvis w IV contrast  Result Date: 5/24/2025  IMPRESSION: Right renal cyst not excluded. Appendix not identified. Moderate stool burden. Soft tissue stranding in the anterior abdominal wall with air bubbles. Large fluid collection along the right lateral aspect of the pelvis.   : PSCB   Transcribe Date/Time: May 24 2025  6:27P Dictated by : JORDI BHATIA MD This examination was interpreted and the report reviewed and electronically signed by: JORDI BHATIA MD on May 24 2025  6:39PM  EST      Cardiology, Vascular, and Other Imaging  CT abdomen pelvis w IV contrast  Result Date: 5/24/2025  * * *Final Report* * * DATE OF EXAM: May 24 2025  4:04PM   [facility]   0530  -  CT ABD/PEL W IVCON  / ACCESSION #  [id number] PROCEDURE REASON: Abdominal abscess/infection suspected      * * * * Physician Interpretation * * * *  EXAMINATION:  CT ABDOMEN AND PELVIS WITH IV CONTRAST CLINICAL HISTORY: Lower abdominal pain.  A recent hernia and bladder prolapse surgery. TECHNIQUE: CT of the abdomen and  pelvis was performed using standard technique, scanning from just above the dome of the diaphragm to the symphysis pubis. MQ:  CTAP_3 Contrast: IV:  100 ml of Omnipaque 350 CT Radiation dose: Integrated Dose-length product (DLP) for this visit =   628 mGy*cm. CT Dose Reduction Employed: Automated exposure control(AEC) and iterative recon COMPARISON: CT abdomen pelvis on 01/18/2025 RESULT: Liver: A new low-attenuation lesions or cysts visualized measuring up to 7 mm, unchanged. Biliary: No bile duct dilation.  No CT evidence of gallbladder stones. Spleen: No mass. No splenomegaly. Pancreas: No mass or duct dilation. Adrenals: No mass. Kidneys: There is a questionable parapelvic cyst in the right kidney.  No hydroureteronephrosis. GI tract: No dilation or wall thickening. The appendix is not identified.  No diverticulitis.  Moderate amount of stool and gas noted in the large bowel loops. Lymph nodes: No abdominal or pelvic lymphadenopathy. Mesentery/Peritoneum: No ascites or mass or free abdominal air. Retroperitoneum: No mass.   Vasculature: The celiac artery, SMA, YOVANI, portal veins and hepatic veins are patent.  No AAA. Pelvis: No mass, ascites or fluid collection. The uterus is surgically absent. Bones/Soft Tissues: Soft tissue stranding seen in the anterior aspect of the abdominal wall, with multiple air bubbles.  Also noted is a 3.6 x 8.3 cm fluid collection along the right lateral aspect of the pelvis, series 2 image 71.  Hernia mesh visualized.  The spine shows mild degenerative changes. Lower thorax: No pleural effusions.  Dependent atelectasis seen in the bilateral lower lobes.  Patient is status post fundoplication. Localizer images: No additional findings.    IMPRESSION: Right renal cyst not excluded. Appendix not identified. Moderate stool burden. Soft tissue stranding in the anterior abdominal wall with air bubbles. Large fluid collection along the right lateral aspect of the pelvis.   :  PSCB   Transcribe Date/Time: May 24 2025  6:27P Dictated by : JORDI BHATIA MD This examination was interpreted and the report reviewed and electronically signed by: JORDI BHATIA MD on May 24 2025  6:39PM  EST        Current Medications  Scheduled medications  Scheduled Medications[1]  Continuous medications  Continuous Medications[2]  PRN medications  PRN Medications[3]     Assessment  Alisha Springer is a 59 y.o. female with a past medical history of asthma, GERD s/p fundoplication, depression, endometriosis, nephrolithiasis, rectocele, female cystocele who is s/p [id number] panniculectomy, ventral hernia repair, sacrospinous ligament suspension for cystocele on 05/12/2025 with Dr. Allison and Dr. Thompson who presented to the ED as a transfer from [facility] for admission under plastic surgery for surgical wound infection of her abdomen. At [facility], CT abdomen pelvis showed soft tissue stranding in the abdominal wall with multiple air bubbles, and large fluid collection along the right lateral aspect of the pelvis. On arrival, patient was hemodynamically stable. She has complaints of extreme nausea without vomiting, significant pain at her abdomen, fever and chills overnight. AN excision debridement at bedside with application of irrigating wound vac was placed. She was admitted under plastic surgery service for IV antibiotics and continued wound care with irrigating wound vac.     Plan/Recommendations  S/P [id number] panniculectomy and ventral hernia repair with Dr. Allison 05/12/2025  #Wound infection  - Due to improving inflammatory markers, WBC WNL, and stable wound exams from 5/28, OR was deferred on 5/29. Discussed with patient, plan for IR aspiration and culture of R abdominal fluid collection on 5/30. Will continue to reassess inflammatory markers and systemic symptoms to determine wound progression and if OR will be of benefit within coming days        ·  NPO at 0000 5/30       ·  T&S UTD, INR ordered for 5/30  AM  - Bedside wound cultures obtained 5/25:       ·  4+ abundant morganella morganii, 2+ Pseudomonas aeruginosa, 4+ Abundant polymorphonuclear leukocytes, 4+ abundant mixed gram positive and gram negative bacteria  - ID consulted, appreciate recs       · IV Vancomycin       · IV Flagyl 500 TID       · IV Cipro 400 BID  - Continue to monitor labs, CRP 6.86 to 4.55, ESR 33 to 21  - Irrigating wound vac removed 5/28       ·  Replaced with BID WTD Acetic acid dressing changes              · Plastics to do AM dressing, nursing to perform PM dressing    - Vital signs Q4    #Acute pain  - Continue norco 5-325 mg Q6 hours PRN (severe pain)  - Continue Naproxen 500 mg BID PRN (mild pain)  - Continue flexeril 5mg TID PRN for muscle spasms  - Zofran 4mg PRN for nausea/vomiting 2/2 pain medications     #Asthma  - Continue home Breo Ellipta and Singulair  - Stable, consult RT as needed    F: none, encourage PO intake  E: monitor and replete PRN  N: regular diet  GI: Colace BID   Dvt ppx: lovenox 40mg daily, SCD's, encourage ambulation     - Patient/plan discussed with Dr. Schmitz    Disposition: Continue care on RNF. Will remain inpatient with plans for OR 5/30 for aspiration and cultures, continued antibiotic therapy. Further dispo pending IR tomorrow.    Marilu Dunn PA-C  Plastic and Reconstructive Surgery   Available via Epic chat, pager: [phone] or team phones: [phone]          [1] [Transfer Hold] acetic acid, , irrigation, Daily  [Transfer Hold] ciprofloxacin, 400 mg, intravenous, q12h  [Held by provider] enoxaparin, 40 mg, subcutaneous, q24h  [Transfer Hold] fluticasone furoate-vilanteroL, 1 puff, inhalation, Daily  [Transfer Hold] metroNIDAZOLE, 500 mg, intravenous, q8h  [Transfer Hold] montelukast, 10 mg, oral, q AM  [Transfer Hold] vancomycin, 1,000 mg, intravenous, q12h     [2]    [3] PRN medications: [Transfer Hold] cyclobenzaprine, [Transfer Hold] diphenhydrAMINE, [Transfer Hold] HYDROcodone-acetaminophen, [Transfer  Hold] ondansetron, [Transfer Hold] vancomycin

## 2025-05-29 NOTE — PROGRESS NOTES
Alisha Springer is a 59 y.o. female on day 4 of admission presenting with Wound dehiscence.    Subjective   Interval History:   Patient seen during early afternoon rounds  Patient supine in bed and in no acute distress  Did not have fever, chills, rigors, shortness of breath or chest pain      Objective   Range of Vitals (last 24 hours)  Heart Rate:  [63-78]   Temp:  [36.4 °C (97.6 °F)-37.1 °C (98.8 °F)]   Resp:  [18]   BP: (104-117)/(62-74)   SpO2:  [93 %-100 %]   Daily Weight  05/27/25 : 74.2 kg (163 lb 9.3 oz)    Body mass index is 28.98 kg/m².    Physical Exam  Alert and oriented  No acute distress  Anterolateral chest clear, S1, S2, I did not hear murmur  Umbilical wound VAC in place  Surgical tape over large abdominal pannus incision  Right sided lateral incision area shows more erythema and inferior induration than on prior exam  I did not feel subcutaneous fluid collection.  With palpation I did not observe strikethrough over the surgical tape.        Antibiotics  ciprofloxacin - 400 mg/200 mL  metroNIDAZOLE - 500 mg/100 mL    Relevant Results  Labs  Results from last 72 hours   Lab Units 05/27/25  0538   WBC AUTO x10*3/uL 10.7   HEMOGLOBIN g/dL 10.8*   HEMATOCRIT % 33.5*   PLATELETS AUTO x10*3/uL 333     Results from last 72 hours   Lab Units 05/27/25  0538   SODIUM mmol/L 140   POTASSIUM mmol/L 4.1   CHLORIDE mmol/L 108*   CO2 mmol/L 25   BUN mg/dL 24*   CREATININE mg/dL 0.52   GLUCOSE mg/dL 101*   CALCIUM mg/dL 8.6   ANION GAP mmol/L 11   EGFR mL/min/1.73m*2 >90         Estimated Creatinine Clearance: 112.4 mL/min (by C-G formula based on SCr of 0.52 mg/dL).  C-Reactive Protein   Date Value Ref Range Status   05/27/2025 6.86 (H) <1.00 mg/dL Final     Microbiology  Susceptibility data from last 14 days.  Collected Specimen Info Organism Amoxicillin/Clavulanate Ampicillin Ampicillin/Sulbactam Aztreonam Cefazolin Cefepime Ceftazidime Ceftriaxone Ciprofloxacin Ertapenem Gentamicin Imipenem Levofloxacin  Meropenem   05/25/25 Tissue/Biopsy from Wound/Tissue Pseudomonas aeruginosa     S   S  S   S     S      Morganella morganii  R  R  R   R    S  S  S  I  I  S  S     Mixed Anaerobic Bacteria                   Collected Specimen Info Organism Piperacillin/Tazobactam Tobramycin Trimethoprim/Sulfamethoxazole   05/25/25 Tissue/Biopsy from Wound/Tissue Pseudomonas aeruginosa  S  S      Morganella morganii  S  S  S     Mixed Anaerobic Bacteria          Assessment/Plan     59-year-old woman who lost a substantial amount of weight during her conversion to a healthy lifestyle of exercise, and eating right.  Quite an inspiring story of motivation to be better for her family and friends.     On 5/12/2025 patient underwent panniculectomy and ventral abdominal wall hernia repair with bioabsorbable mesh.  SUNSHINE drains were removed 5/19/2025.  At that time there was suspicion of possible superficial ischemic changes in the umbilical area.  Patient ultimately developed drainage and then presented on 5/24/2025 with purulent malodorous drainage.  Patient had bedside I&D on 5/25/25     Patient started on IV vancomycin, metronidazole and ciprofloxacin, which have been well-tolerated.  In the past patient is also taking levofloxacin.  Preliminary cultures show mixed anaerobic bacteria, Pseudomonas aeruginosa (pan susceptible) and Morganella morganii (antibiotics pending).      patient also has right lateral flank fluid collection that likely also is infected and this correlates with exam findings of spotted erythema and induration although I did not palpate overt fluid fluctuation.  Need to consider IR guided aspirate of fluid for diagnostic culture and maybe drain placement, which would be important for source control.     Polymicrobial postoperative panniculectomy wound infection with secondary fluid collection in the lateral flank (film not available to review)     5/19 Bedside debridement sample sent to laboratory      Culture growing  mixed anaerobes, Pseudomonas and Morganella     Continue current antibiotics.  Hoping to have oral regimen to treat.     Need to strongly consider IR or ultrasound-guided fluid aspirate and/or drain placement in large subcutaneous fluid collection on the left.  This fluid is likely co-infected and drainage and/or aspirate would provide additional source control.     5/28/2025 update:     Initially had planned on switching patient to ciprofloxacin (to cover Pseudomonas and Morganella, see susceptibility testing) and metronidazole for mixed anaerobic coverage.  Since patient seems to have some progression of the erythema in the right lateral flank I opted to continue IV therapy.  Also patient going for additional umbilical wound examination and debridement.   I am unable to review films but discussed with plastic surgery.  Need to reimage and/or identify where reported right pelvic fluid collection resides.  May be deep to the right lateral incision redness.  If fluid present in the subcutaneous flank or deep pelvis need diagnostic aspirate to aid with diagnostic microbiology and source control which will affect duration of therapy or need for future surgeries      Recommendations  1.  Continue vancomycin, ciprofloxacin, and metronidazole  2.  Consider IR  evaluation for right flank?vs?pelvic fluid aspiration (and possible drain placement) for diagnostic culture and source control  3.  Will advise on antibiotic changes and duration of therapy     Discussed with patient and primary team  Pierre Villalobos MD  ID Staff    I spent 60 minutes in the professional and overall care of this patient.

## 2025-05-29 NOTE — CARE PLAN
Problem: Pain - Adult  Goal: Verbalizes/displays adequate comfort level or baseline comfort level  Outcome: Progressing     Problem: Safety - Adult  Goal: Free from fall injury  Outcome: Progressing     Problem: Nutrition  Goal: Nutrient intake appropriate for maintaining nutritional needs  Outcome: Progressing     Problem: Pain  Goal: Takes deep breaths with improved pain control throughout the shift  Outcome: Progressing  Goal: Turns in bed with improved pain control throughout the shift  Outcome: Progressing  Goal: Free from opioid side effects throughout the shift  Outcome: Progressing  Goal: Free from acute confusion related to pain meds throughout the shift  Outcome: Progressing

## 2025-05-30 ENCOUNTER — APPOINTMENT (OUTPATIENT)
Dept: RADIOLOGY | Facility: HOSPITAL | Age: 60
End: 2025-05-30
Payer: COMMERCIAL

## 2025-05-30 LAB
ANION GAP SERPL CALC-SCNC: 13 MMOL/L (ref 10–20)
BUN SERPL-MCNC: 21 MG/DL (ref 6–23)
CALCIUM SERPL-MCNC: 8.2 MG/DL (ref 8.6–10.6)
CHLORIDE SERPL-SCNC: 106 MMOL/L (ref 98–107)
CO2 SERPL-SCNC: 27 MMOL/L (ref 21–32)
CREAT SERPL-MCNC: 0.6 MG/DL (ref 0.5–1.05)
CRP SERPL-MCNC: 4.12 MG/DL
EGFRCR SERPLBLD CKD-EPI 2021: >90 ML/MIN/1.73M*2
ERYTHROCYTE [DISTWIDTH] IN BLOOD BY AUTOMATED COUNT: 13.5 % (ref 11.5–14.5)
ERYTHROCYTE [SEDIMENTATION RATE] IN BLOOD BY WESTERGREN METHOD: 28 MM/H (ref 0–30)
GLUCOSE SERPL-MCNC: 103 MG/DL (ref 74–99)
HCT VFR BLD AUTO: 35.1 % (ref 36–46)
HGB BLD-MCNC: 11.1 G/DL (ref 12–16)
INR PPP: 1.1 (ref 0.9–1.1)
MCH RBC QN AUTO: 29.4 PG (ref 26–34)
MCHC RBC AUTO-ENTMCNC: 31.6 G/DL (ref 32–36)
MCV RBC AUTO: 93 FL (ref 80–100)
NRBC BLD-RTO: 0 /100 WBCS (ref 0–0)
PLATELET # BLD AUTO: 348 X10*3/UL (ref 150–450)
POTASSIUM SERPL-SCNC: 4.9 MMOL/L (ref 3.5–5.3)
PREALB SERPL-MCNC: 14.2 MG/DL (ref 18–40)
PROTHROMBIN TIME: 12.1 SECONDS (ref 9.8–12.4)
RBC # BLD AUTO: 3.78 X10*6/UL (ref 4–5.2)
SODIUM SERPL-SCNC: 141 MMOL/L (ref 136–145)
VANCOMYCIN SERPL-MCNC: 20.7 UG/ML (ref 5–20)
WBC # BLD AUTO: 11.4 X10*3/UL (ref 4.4–11.3)

## 2025-05-30 PROCEDURE — 84134 ASSAY OF PREALBUMIN: CPT

## 2025-05-30 PROCEDURE — 85610 PROTHROMBIN TIME: CPT

## 2025-05-30 PROCEDURE — 86140 C-REACTIVE PROTEIN: CPT

## 2025-05-30 PROCEDURE — C1729 CATH, DRAINAGE: HCPCS

## 2025-05-30 PROCEDURE — 85027 COMPLETE CBC AUTOMATED: CPT

## 2025-05-30 PROCEDURE — 7100000010 HC PHASE TWO TIME - EACH INCREMENTAL 1 MINUTE

## 2025-05-30 PROCEDURE — 87077 CULTURE AEROBIC IDENTIFY: CPT

## 2025-05-30 PROCEDURE — 7100000009 HC PHASE TWO TIME - INITIAL BASE CHARGE

## 2025-05-30 PROCEDURE — 80202 ASSAY OF VANCOMYCIN: CPT

## 2025-05-30 PROCEDURE — 2720000007 HC OR 272 NO HCPCS

## 2025-05-30 PROCEDURE — 2500000004 HC RX 250 GENERAL PHARMACY W/ HCPCS (ALT 636 FOR OP/ED): Mod: JZ

## 2025-05-30 PROCEDURE — 2500000004 HC RX 250 GENERAL PHARMACY W/ HCPCS (ALT 636 FOR OP/ED): Mod: JZ | Performed by: RADIOLOGY

## 2025-05-30 PROCEDURE — 2500000001 HC RX 250 WO HCPCS SELF ADMINISTERED DRUGS (ALT 637 FOR MEDICARE OP)

## 2025-05-30 PROCEDURE — 36415 COLL VENOUS BLD VENIPUNCTURE: CPT

## 2025-05-30 PROCEDURE — 1170000001 HC PRIVATE ONCOLOGY ROOM DAILY

## 2025-05-30 PROCEDURE — 49406 IMAGE CATH FLUID PERI/RETRO: CPT

## 2025-05-30 PROCEDURE — 85652 RBC SED RATE AUTOMATED: CPT

## 2025-05-30 PROCEDURE — 2500000004 HC RX 250 GENERAL PHARMACY W/ HCPCS (ALT 636 FOR OP/ED)

## 2025-05-30 PROCEDURE — C1769 GUIDE WIRE: HCPCS

## 2025-05-30 PROCEDURE — 94640 AIRWAY INHALATION TREATMENT: CPT

## 2025-05-30 PROCEDURE — 99232 SBSQ HOSP IP/OBS MODERATE 35: CPT

## 2025-05-30 PROCEDURE — 80048 BASIC METABOLIC PNL TOTAL CA: CPT

## 2025-05-30 RX ORDER — FENTANYL CITRATE 50 UG/ML
INJECTION, SOLUTION INTRAMUSCULAR; INTRAVENOUS
Status: COMPLETED | OUTPATIENT
Start: 2025-05-30 | End: 2025-05-30

## 2025-05-30 RX ORDER — LIDOCAINE 560 MG/1
1 PATCH PERCUTANEOUS; TOPICAL; TRANSDERMAL DAILY
Status: DISCONTINUED | OUTPATIENT
Start: 2025-05-31 | End: 2025-06-06 | Stop reason: HOSPADM

## 2025-05-30 RX ORDER — MIDAZOLAM HYDROCHLORIDE 1 MG/ML
INJECTION INTRAMUSCULAR; INTRAVENOUS
Status: COMPLETED | OUTPATIENT
Start: 2025-05-30 | End: 2025-05-30

## 2025-05-30 RX ADMIN — FLUTICASONE FUROATE AND VILANTEROL TRIFENATATE 1 PUFF: 100; 25 POWDER RESPIRATORY (INHALATION) at 08:44

## 2025-05-30 RX ADMIN — ENOXAPARIN SODIUM 40 MG: 100 INJECTION SUBCUTANEOUS at 22:09

## 2025-05-30 RX ADMIN — CIPROFLOXACIN 400 MG: 400 INJECTION, SOLUTION INTRAVENOUS at 22:09

## 2025-05-30 RX ADMIN — FENTANYL CITRATE 50 MCG: 50 INJECTION, SOLUTION INTRAMUSCULAR; INTRAVENOUS at 14:34

## 2025-05-30 RX ADMIN — CYCLOBENZAPRINE HYDROCHLORIDE 5 MG: 10 TABLET, FILM COATED ORAL at 20:41

## 2025-05-30 RX ADMIN — METRONIDAZOLE 500 MG: 500 INJECTION, SOLUTION INTRAVENOUS at 23:18

## 2025-05-30 RX ADMIN — MIDAZOLAM HYDROCHLORIDE 1 MG: 1 INJECTION, SOLUTION INTRAMUSCULAR; INTRAVENOUS at 14:34

## 2025-05-30 RX ADMIN — HYDROCODONE BITARTRATE AND ACETAMINOPHEN 1 TABLET: 5; 325 TABLET ORAL at 09:41

## 2025-05-30 RX ADMIN — METRONIDAZOLE 500 MG: 500 INJECTION, SOLUTION INTRAVENOUS at 07:55

## 2025-05-30 RX ADMIN — FENTANYL CITRATE 50 MCG: 50 INJECTION, SOLUTION INTRAMUSCULAR; INTRAVENOUS at 14:31

## 2025-05-30 RX ADMIN — VANCOMYCIN HYDROCHLORIDE 1000 MG: 1 INJECTION, SOLUTION INTRAVENOUS at 11:38

## 2025-05-30 RX ADMIN — METRONIDAZOLE 500 MG: 500 INJECTION, SOLUTION INTRAVENOUS at 15:30

## 2025-05-30 RX ADMIN — MONTELUKAST 10 MG: 10 TABLET, FILM COATED ORAL at 09:41

## 2025-05-30 RX ADMIN — HYDROCODONE BITARTRATE AND ACETAMINOPHEN 1 TABLET: 5; 325 TABLET ORAL at 20:30

## 2025-05-30 RX ADMIN — METRONIDAZOLE 500 MG: 500 INJECTION, SOLUTION INTRAVENOUS at 00:54

## 2025-05-30 RX ADMIN — MIDAZOLAM HYDROCHLORIDE 1 MG: 1 INJECTION, SOLUTION INTRAMUSCULAR; INTRAVENOUS at 14:31

## 2025-05-30 RX ADMIN — ACETIC ACID 1000 ML: 250 IRRIGANT IRRIGATION at 09:42

## 2025-05-30 RX ADMIN — CIPROFLOXACIN 400 MG: 400 INJECTION, SOLUTION INTRAVENOUS at 10:18

## 2025-05-30 ASSESSMENT — PAIN - FUNCTIONAL ASSESSMENT
PAIN_FUNCTIONAL_ASSESSMENT: 0-10

## 2025-05-30 ASSESSMENT — PAIN SCALES - GENERAL
PAINLEVEL_OUTOF10: 5 - MODERATE PAIN
PAINLEVEL_OUTOF10: 4
PAINLEVEL_OUTOF10: 7
PAINLEVEL_OUTOF10: 4
PAINLEVEL_OUTOF10: 6
PAINLEVEL_OUTOF10: 6
PAINLEVEL_OUTOF10: 8
PAINLEVEL_OUTOF10: 8
PAINLEVEL_OUTOF10: 4
PAINLEVEL_OUTOF10: 6
PAINLEVEL_OUTOF10: 5 - MODERATE PAIN

## 2025-05-30 ASSESSMENT — COGNITIVE AND FUNCTIONAL STATUS - GENERAL
MOBILITY SCORE: 24
DAILY ACTIVITIY SCORE: 24
DAILY ACTIVITIY SCORE: 24
MOBILITY SCORE: 24

## 2025-05-30 ASSESSMENT — PAIN DESCRIPTION - LOCATION: LOCATION: ABDOMEN

## 2025-05-30 NOTE — POST-PROCEDURE NOTE
Interventional Radiology Brief Postprocedure Note    Attending: Dr. Dotson    Assistant: Dr. Nguyen    Diagnosis: Right pelvic fluid collection    Description of procedure: US guided pelvic fluid collection drain placemement     Anesthesia:  MAC Moderate    Complications: None    Estimated Blood Loss: minimal    Medications  As of 05/30/25 1454      ondansetron (Zofran) injection 4 mg (mg) Total dose:  16 mg Dosing weight:  61.2      Date/Time Rate/Dose/Volume Action       05/24/25  2226 4 mg Given     05/27/25  0634 4 mg Given      1532 4 mg Given      2359 4 mg Given     05/29/25  1306 *Not included in total MAR Hold      1434 *Not included in total MAR Unhold               morphine injection 4 mg (mg) Total dose:  4 mg Dosing weight:  61.2      Date/Time Rate/Dose/Volume Action       05/24/25  2227 4 mg Given               enoxaparin (Lovenox) syringe 40 mg (mg) Total dose:  200 mg* Dosing weight:  61.2   *Administration not included in total     Date/Time Rate/Dose/Volume Action       05/24/25  2300 40 mg Given     05/26/25  0150 40 mg Given      2333 40 mg Given     05/27/25  2355 40 mg Given     05/28/25  1835 *Not included in total Held by provider      2300 *40 mg Missed     05/29/25  1435 *Not included in total Unheld by provider      2330 40 mg Given               docusate sodium (Colace) capsule 100 mg (mg) Total dose:  0 mg* Dosing weight:  61.2   *Administration not included in total     Date/Time Rate/Dose/Volume Action       05/24/25  2259 *100 mg Missed               cyclobenzaprine (Flexeril) tablet 5 mg (mg) Total dose:  55 mg Dosing weight:  61.2      Date/Time Rate/Dose/Volume Action       05/25/25  0435 5 mg Given      1149 5 mg Given     05/26/25  0928 5 mg Given      2037 5 mg Given     05/27/25  0816 5 mg Given      2112 5 mg Given     05/28/25  1134 5 mg Given      2129 5 mg Given     05/29/25  0355 5 mg Given      1211 5 mg Given      1306 *Not included in total MAR Hold      1435 *Not  included in total MAR Unhold      2128 5 mg Given               HYDROcodone-acetaminophen (Norco) 5-325 mg per tablet 1 tablet (tablet) Total dose:  13 tablet* Dosing weight:  61.2   *Administration not included in total     Date/Time Rate/Dose/Volume Action       05/25/25  0147 1 tablet Given      0827 1 tablet Given      1427 *1 tablet Missed      1617 1 tablet Given     05/26/25  0056 1 tablet Given      Canceled Entry      2037 1 tablet Given     05/27/25  2112 1 tablet Given     05/28/25  0859 1 tablet Given      1526 1 tablet Given      2127 1 tablet Given     05/29/25  0355 1 tablet Given      1211 1 tablet Given      1306 *Not included in total MAR Hold      1435 *Not included in total MAR Unhold      2128 1 tablet Given     05/30/25  0941 1 tablet Given               montelukast (Singulair) tablet 10 mg (mg) Total dose:  60 mg      Date/Time Rate/Dose/Volume Action       05/25/25  0812 10 mg Given     05/26/25  0822 10 mg Given     05/27/25  0816 10 mg Given     05/28/25  0859 10 mg Given     05/29/25  0924 10 mg Given      1306 *Not included in total MAR Hold      1435 *Not included in total MAR Unhold     05/30/25  0941 10 mg Given               fluticasone furoate-vilanteroL (Breo Ellipta) 100-25 mcg/dose inhaler 1 puff (puff) Total dose:  5 puff* Dosing weight:  61.2   *Administration not included in total     Date/Time Rate/Dose/Volume Action       05/25/25  0812 1 puff Given     05/26/25  0923 1 puff Given     05/27/25  0816 1 puff Given     05/28/25  0928 1 puff Given     05/29/25  0916 *1 puff Missed      1306 *Not included in total MAR Hold      1435 *Not included in total MAR Unhold     05/30/25  0844 1 puff Given               ciprofloxacin (Cipro) 400 mg in dextrose 5%  mL (mL/hr) Total dose:  1,200 mg* Dosing weight:  61.2   *From user-documented volume     Date/Time Rate/Dose/Volume Action       05/25/25  0418 400 mg - 200 mL/hr (over 60 min) New Bag      0601  (over 60 min) Stopped       1607 400 mg - 200 mL/hr (over 60 min) New Bag      1707  (over 60 min) Stopped      2025 400 mL      05/26/25  0436 400 mg - 200 mL/hr (over 60 min) New Bag      0600 200 mL Stopped      1626 400 mg - 200 mL/hr (over 60 min) New Bag      1724  (over 60 min) Stopped     05/27/25  0521 400 mg - 200 mL/hr (over 60 min) New Bag      0621  (over 60 min) Stopped      1602 400 mg - 200 mL/hr (over 60 min) New Bag      1655  (over 60 min) Stopped     05/28/25  0550 400 mg - 200 mL/hr (over 60 min) New Bag      0650  (over 60 min) Stopped      Canceled Entry               ciprofloxacin (Cipro) 400 mg in dextrose 5%  mL (mL/hr) Total dose:  400 mg* Dosing weight:  74.2   *From user-documented volume     Date/Time Rate/Dose/Volume Action       05/29/25  0020 400 mg - 200 mL/hr (over 60 min) New Bag      0120 200 mL Stopped      1211 400 mg - 200 mL/hr (over 60 min) New Bag      1306 *Not included in total MAR Hold      1435 *Not included in total MAR Unhold      1455  (over 60 min) Stopped      2330 400 mg - 200 mL/hr (over 60 min) New Bag     05/30/25  0030  (over 60 min) Stopped      1018 400 mg - 200 mL/hr (over 60 min) New Bag      1145  (over 60 min) Stopped               metroNIDAZOLE (Flagyl) 500 mg in sodium chloride (iso)  mL (mg) Total dose:  500 mg* Dosing weight:  61.2   *From user-documented volume     Date/Time Rate/Dose/Volume Action       05/25/25  0601 500 mg (over 60 min) New Bag      0701  (over 60 min) Stopped      1418 500 mg (over 60 min) New Bag      1528  (over 60 min) Stopped     05/26/25  0109 500 mg (over 60 min) New Bag      0245 100 mL Stopped      1016 500 mg (over 60 min) New Bag      1035  (over 60 min) Stopped      1732 500 mg (over 60 min) New Bag      1802  (over 60 min) Stopped      2333 500 mg (over 60 min) New Bag      2337  (over 60 min) Stopped     05/27/25  0055  (over 60 min) Stopped      1117 500 mg (over 60 min) New Bag      1144  (over 60 min) Stopped      1752 500 mg  (over 60 min) New Bag      1817  (over 60 min) Stopped      2355 500 mg (over 60 min) New Bag     05/28/25  0859 500 mg (over 60 min) New Bag      1010  (over 60 min) Stopped      Canceled Entry               metroNIDAZOLE (Flagyl) 500 mg in sodium chloride (iso)  mL (mg) Total dose:  500 mg* Dosing weight:  74.2   *From user-documented volume     Date/Time Rate/Dose/Volume Action       05/28/25  2330 500 mg (over 60 min) New Bag     05/29/25  0030 100 mL Stopped      0631 500 mg (over 60 min) New Bag      0735  (over 60 min) Stopped      1306 *Not included in total MAR Hold      1435 *Not included in total MAR Unhold      1512 500 mg (over 60 min) New Bag      1538  (over 60 min) Stopped     05/30/25  0054 500 mg (over 60 min) New Bag      0154  (over 60 min) Stopped      0755 500 mg (over 60 min) New Bag      0943  (over 60 min) Stopped               vancomycin (Vancocin) 1,000 mg in dextrose 5%  mL (mL/hr) Total volume:  Not documented* Dosing weight:  66.9   *Total volume has not been documented. View each administration to see the amount administered.     Date/Time Rate/Dose/Volume Action       05/25/25  0812 1,000 mg - 100 mL/hr (over 120 min) New Bag      1012  (over 120 min) Stopped               vancomycin (Vancocin) 1,000 mg in dextrose 5%  mL (mL/hr) Total volume:  Not documented* Dosing weight:  73   *Total volume has not been documented. View each administration to see the amount administered.     Date/Time Rate/Dose/Volume Action       05/26/25  2038 1,000 mg - 200 mL/hr (over 60 min) New Bag      2047  (over 60 min) Stopped     05/27/25  0816 1,000 mg - 200 mL/hr (over 60 min) New Bag      1117  (over 60 min) Stopped      2112 1,000 mg - 200 mL/hr (over 60 min) New Bag      2124  (over 60 min) Stopped     05/28/25  0859 1,000 mg - 200 mL/hr (over 60 min) New Bag      0949  (over 60 min) Stopped               vancomycin (Vancocin) 1,000 mg in dextrose 5%  mL (mL/hr) Total  volume:  Not documented* Dosing weight:  69.4   *Total volume has not been documented. View each administration to see the amount administered.     Date/Time Rate/Dose/Volume Action       05/29/25  0924 1,000 mg - 200 mL/hr (over 60 min) New Bag      0942  (over 60 min) Stopped      1306 *Not included in total MAR Hold      1435 *Not included in total MAR Unhold      2121 1,000 mg - 200 mL/hr (over 60 min) New Bag      2221  (over 60 min) Stopped     05/30/25  1138 1,000 mg - 200 mL/hr (over 60 min) New Bag      1316  (over 60 min) Stopped               diphenhydrAMINE (BENADryl) injection 25 mg (mg) Total dose:  25 mg Dosing weight:  66.9      Date/Time Rate/Dose/Volume Action       05/27/25  2119 25 mg Given     05/29/25  1306 *Not included in total MAR Hold      1435 *Not included in total MAR Unhold               sodium hypochlorite (Dakin's Quarter Strength) 0.125 % external solution Total dose:  Cannot be calculated* Dosing weight:  66.9   *Administration does not have dose documented     Date/Time Rate/Dose/Volume Action       05/25/25  0812  Given     05/26/25  0830  Given     05/27/25  0816  Given     05/28/25  Canceled Entry     05/29/25  Canceled Entry               ketorolac (Toradol) injection 30 mg (mg) Total dose:  360 mg Dosing weight:  72.8      Date/Time Rate/Dose/Volume Action       05/25/25  1218 30 mg Given      1720 30 mg Given     05/26/25  0057 30 mg Given      0706 30 mg Given      1236 30 mg Given      1805 30 mg Given      2333 30 mg Given     05/27/25  0521 30 mg Given      1117 30 mg Given      1752 30 mg Given      2355 30 mg Given     05/28/25  0550 30 mg Given               vancomycin (Vancocin) 1,500 mg in sodium chloride 0.9%  mL (mL/hr) Total volume:  Not documented* Dosing weight:  72.8   *Total volume has not been documented. View each administration to see the amount administered.     Date/Time Rate/Dose/Volume Action       05/25/25 2025 1,500 mg - 333.3 mL/hr (over 90  min) New Bag      2035 333.3 mL/hr (over 90 min) Restarted     05/26/25  0823 1,500 mg - 333.3 mL/hr (over 90 min) New Bag      0950  (over 90 min) Stopped               metroNIDAZOLE (Flagyl) tablet 500 mg (mg) Total dose:  500 mg Dosing weight:  74.2      Date/Time Rate/Dose/Volume Action       05/28/25  1151 500 mg Given               ciprofloxacin (Cipro) tablet 750 mg (mg) Total dose:  750 mg Dosing weight:  74.2      Date/Time Rate/Dose/Volume Action       05/28/25  1151 750 mg Given               acetic acid 0.25 % irrigation solution (mL) Total volume:  1,000 mL Dosing weight:  74.2      Date/Time Rate/Dose/Volume Action       05/28/25  Canceled Entry     05/29/25  Canceled Entry      1306 *Not included in total MAR Hold      1435 *Not included in total MAR Unhold     05/30/25  0942 1,000 mL Given               vancomycin (Vancocin) pharmacy to dose - pharmacy monitoring Total dose:  Cannot be calculated* Dosing weight:  69.4   *Administration dose not documented     Date/Time Rate/Dose/Volume Action       05/29/25  1306 *Not included in total MAR Hold      1435 *Not included in total MAR Unhold               fentaNYL PF (Sublimaze) injection (mcg) Total dose:  100 mcg      Date/Time Rate/Dose/Volume Action       05/30/25  1431 50 mcg Given      1434 50 mcg Given               midazolam (Versed) injection (mg) Total dose:  2 mg      Date/Time Rate/Dose/Volume Action       05/30/25  1431 1 mg Given      1434 1 mg Given                   Patient was brought to the procedure area.  Limited diagnostic scanning of the right pelvic subcutaneous tissues was performed, which demonstrated a complex fluid collection in the right lateral pelvic subcutaneous tissues. Subsequently the patient was prepped and draped in the usual sterile manner.  Lidocaine was administered for local analgesia.  Subsequently with a 5F Yueh, the fluid collection was accessed under ultrasound guidance.  5cc brown purulent fluid was aspirated  for cytology/microbiology. The inner stylet was removed and a Yañez wire was advanced into the fluid collection.  An 8F pigtail skater drain was subsequently placed.   A stayfix dressing was applied. Patient tolerated the procedure. See PACS for full details.      If abscess drain is not draining correctly or there are questions regarding care and management of this drain while patient is inpatient status, please call [phone] for IR nursing to triage.        See detailed result report with images in PACS.    The patient tolerated the procedure well without incident or complication and is in stable condition.

## 2025-05-30 NOTE — CARE PLAN
Problem: Safety - Adult  Goal: Free from fall injury  Outcome: Progressing     Problem: Nutrition  Goal: Nutrient intake appropriate for maintaining nutritional needs  Outcome: Progressing     Problem: Pain  Goal: Free from opioid side effects throughout the shift  Outcome: Progressing  Goal: Free from acute confusion related to pain meds throughout the shift  Outcome: Progressing

## 2025-05-30 NOTE — PRE-PROCEDURE NOTE
Interventional Radiology Preprocedure Note    Indication for procedure: The primary encounter diagnosis was Wound dehiscence. A diagnosis of Wound infection was also pertinent to this visit.    Relevant review of systems: NA    Relevant Labs:   Lab Results   Component Value Date    CREATININE 0.60 05/30/2025    EGFR >90 05/30/2025    INR 1.1 05/30/2025    PROTIME 12.1 05/30/2025       Planned Sedation/Anesthesia: Moderate    Airway assessment: normal    Directed physical examination:    Breathing comfortably on room air  Alert and oriented    Mallampati: II (hard and soft palate, upper portion of tonsils and uvula visible)    ASA Score: ASA 2 - Patient with mild systemic disease with no functional limitations    Benefits, risks and alternatives of procedure and planned sedation have been discussed with the patient and/or their representative. All questions answered and they agree to proceed.

## 2025-05-30 NOTE — PROGRESS NOTES
"  Department of Plastic and Reconstructive Surgery  Daily Progress Note    Patient Name: Alisha Springer  MRN: [id number]  Date:  05/30/25     Subjective  Found resting in bed comfortably on exam. Continues to endorse tenderness to midline abdomen wound, particularly tender to L aspect of wound, which is worsened during dressing changes. She is aware of plan for IR to aspirate abdominal fluid collection and obtain cultures, likely this afternoon. Continuing BID Acetic Acid dressing changes to abdominal wound. Denies any fever, chills, night sweats, CP, SOB, palpitations, vomiting, diarrhea, constipation or dysuria.      Overnight Events  None     Objective    Vital Signs  /68 (BP Location: Left arm, Patient Position: Lying)   Pulse 91   Temp 36.9 °C (98.4 °F) (Temporal)   Resp 14   Ht 1.6 m (5' 3\")   Wt 69.8 kg (153 lb 14.1 oz)   SpO2 95%   BMI 27.26 kg/m²      Physical Exam:  Constitutional: A&Ox3, calm and cooperative, NAD.  Eyes: PERRL, EOMI  ENMT: Moist mucous membranes, no apparent injuries or lesions.  Head/Neck: NC/AT.   Cardiovascular: Normal rate and regular rhythm. 2+ equal pulses of the distal extremities.  Respiratory/Thorax: Regular respirations on RA. Good symmetric chest expansion.   Gastrointestinal: Abdomen soft, tender surrounding midline wound, pink granulation tissue with yellow/green fibrinous exudate present in wound bed, acetic acid WTD dressing removed and replaced on exam. Improving surrounding erythema compared to previous exams. Remaining lower abdominal incision is c/d/I with sylke dressing in place.   Genitourinary: voiding independently   Extremities: No peripheral edema. Moving all 4 extremities actively.   Neurological: A&Ox3.   Psychological: Appropriate mood and behavior.      Diagnostics   Results for orders placed or performed during the hospital encounter of 05/24/25 (from the past 24 hours)   POCT GLUCOSE   Result Value Ref Range    POCT Glucose 139 (H) 74 - 99 mg/dL "   Prealbumin   Result Value Ref Range    Prealbumin 14.2 (L) 18.0 - 40.0 mg/dL   Protime-INR   Result Value Ref Range    Protime 12.1 9.8 - 12.4 seconds    INR 1.1 0.9 - 1.1   CBC   Result Value Ref Range    WBC 11.4 (H) 4.4 - 11.3 x10*3/uL    nRBC 0.0 0.0 - 0.0 /100 WBCs    RBC 3.78 (L) 4.00 - 5.20 x10*6/uL    Hemoglobin 11.1 (L) 12.0 - 16.0 g/dL    Hematocrit 35.1 (L) 36.0 - 46.0 %    MCV 93 80 - 100 fL    MCH 29.4 26.0 - 34.0 pg    MCHC 31.6 (L) 32.0 - 36.0 g/dL    RDW 13.5 11.5 - 14.5 %    Platelets 348 150 - 450 x10*3/uL   Basic metabolic panel   Result Value Ref Range    Glucose 103 (H) 74 - 99 mg/dL    Sodium 141 136 - 145 mmol/L    Potassium 4.9 3.5 - 5.3 mmol/L    Chloride 106 98 - 107 mmol/L    Bicarbonate 27 21 - 32 mmol/L    Anion Gap 13 10 - 20 mmol/L    Urea Nitrogen 21 6 - 23 mg/dL    Creatinine 0.60 0.50 - 1.05 mg/dL    eGFR >90 >60 mL/min/1.73m*2    Calcium 8.2 (L) 8.6 - 10.6 mg/dL   C-reactive protein   Result Value Ref Range    C-Reactive Protein 4.12 (H) <1.00 mg/dL   Sedimentation rate, automated   Result Value Ref Range    Sedimentation Rate 28 0 - 30 mm/h     Imaging  CT abdomen pelvis w IV contrast  Result Date: 5/24/2025  IMPRESSION: Right renal cyst not excluded. Appendix not identified. Moderate stool burden. Soft tissue stranding in the anterior abdominal wall with air bubbles. Large fluid collection along the right lateral aspect of the pelvis.   : PSCB   Transcribe Date/Time: May 24 2025  6:27P Dictated by : JORDI BHATIA MD This examination was interpreted and the report reviewed and electronically signed by: JORDI BHATIA MD on May 24 2025  6:39PM  EST      Cardiology, Vascular, and Other Imaging  CT abdomen pelvis w IV contrast  Result Date: 5/24/2025  * * *Final Report* * * DATE OF EXAM: May 24 2025  4:04PM   [facility]   0530  -  CT ABD/PEL W IVCON  / ACCESSION #  [id number] PROCEDURE REASON: Abdominal abscess/infection suspected      * * * * Physician Interpretation * * * *   EXAMINATION:  CT ABDOMEN AND PELVIS WITH IV CONTRAST CLINICAL HISTORY: Lower abdominal pain.  A recent hernia and bladder prolapse surgery. TECHNIQUE: CT of the abdomen and pelvis was performed using standard technique, scanning from just above the dome of the diaphragm to the symphysis pubis. MQ:  CTAP_3 Contrast: IV:  100 ml of Omnipaque 350 CT Radiation dose: Integrated Dose-length product (DLP) for this visit =   628 mGy*cm. CT Dose Reduction Employed: Automated exposure control(AEC) and iterative recon COMPARISON: CT abdomen pelvis on 01/18/2025 RESULT: Liver: A new low-attenuation lesions or cysts visualized measuring up to 7 mm, unchanged. Biliary: No bile duct dilation.  No CT evidence of gallbladder stones. Spleen: No mass. No splenomegaly. Pancreas: No mass or duct dilation. Adrenals: No mass. Kidneys: There is a questionable parapelvic cyst in the right kidney.  No hydroureteronephrosis. GI tract: No dilation or wall thickening. The appendix is not identified.  No diverticulitis.  Moderate amount of stool and gas noted in the large bowel loops. Lymph nodes: No abdominal or pelvic lymphadenopathy. Mesentery/Peritoneum: No ascites or mass or free abdominal air. Retroperitoneum: No mass.   Vasculature: The celiac artery, SMA, YOVANI, portal veins and hepatic veins are patent.  No AAA. Pelvis: No mass, ascites or fluid collection. The uterus is surgically absent. Bones/Soft Tissues: Soft tissue stranding seen in the anterior aspect of the abdominal wall, with multiple air bubbles.  Also noted is a 3.6 x 8.3 cm fluid collection along the right lateral aspect of the pelvis, series 2 image 71.  Hernia mesh visualized.  The spine shows mild degenerative changes. Lower thorax: No pleural effusions.  Dependent atelectasis seen in the bilateral lower lobes.  Patient is status post fundoplication. Localizer images: No additional findings.    IMPRESSION: Right renal cyst not excluded. Appendix not identified. Moderate  stool burden. Soft tissue stranding in the anterior abdominal wall with air bubbles. Large fluid collection along the right lateral aspect of the pelvis.   : PSCB   Transcribe Date/Time: May 24 2025  6:27P Dictated by : JORDI BHTAIA MD This examination was interpreted and the report reviewed and electronically signed by: JORDI BHATIA MD on May 24 2025  6:39PM  EST        Current Medications  Scheduled medications  Scheduled Medications[1]  Continuous medications  Continuous Medications[2]  PRN medications  PRN Medications[3]     Assessment  Alisha Springer is a 59 y.o. female with a past medical history of asthma, GERD s/p fundoplication, depression, endometriosis, nephrolithiasis, rectocele, female cystocele who is s/p [id number] panniculectomy, ventral hernia repair, sacrospinous ligament suspension for cystocele on 05/12/2025 with Dr. Allison and Dr. Thompson who presented to the ED as a transfer from [facility] for admission under plastic surgery for surgical wound infection of her abdomen. At [facility], CT abdomen pelvis showed soft tissue stranding in the abdominal wall with multiple air bubbles, and large fluid collection along the right lateral aspect of the pelvis. On arrival, patient was hemodynamically stable. She has complaints of extreme nausea without vomiting, significant pain at her abdomen, fever and chills overnight. AN excision debridement at bedside with application of irrigating wound vac was placed. She was admitted under plastic surgery service for IV antibiotics and continued wound care with irrigating wound vac.     Plan/Recommendations  S/P [id number] panniculectomy and ventral hernia repair with Dr. Allison 05/12/2025  #Wound infection  - Due to improving inflammatory markers, WBC WNL, and stable wound exams from 5/28, OR was deferred on 5/29. Will instead plan for IR aspiration and culture of R abdominal fluid collection today. Will continue to reassess inflammatory markers and  systemic symptoms to determine wound progression and if OR will be of benefit within coming days        ·  NPO at 0000 today until IR complete  - Bedside wound cultures obtained 5/25:       ·  4+ abundant morganella morganii, 2+ Pseudomonas aeruginosa, 4+ Abundant polymorphonuclear leukocytes, 4+ abundant mixed gram positive and gram negative bacteria  - ID consulted, appreciate recs       · IV Vancomycin       · IV Flagyl 500 TID       · IV Cipro 400 BID  - Continue to monitor labs, CRP 6.86 -> 4.55 ->4.12, ESR 33 -> 21 -> 28  - Irrigating wound vac removed 5/28       ·  Replaced with BID WTD Acetic acid dressing changes              · Plastics to do AM dressing, nursing to perform PM dressing    - Vital signs Q4    #Acute pain  - Continue norco 5-325 mg Q6 hours PRN (severe pain)  - Continue Naproxen 500 mg BID PRN (mild pain)  - Continue flexeril 5mg TID PRN for muscle spasms  - Zofran 4mg PRN for nausea/vomiting 2/2 pain medications     #Asthma  - Continue home Breo Ellipta and Singulair  - Stable, consult RT as needed    F: none, encourage PO intake  E: monitor and replete PRN  N: regular diet  GI: Colace BID   Dvt ppx: lovenox 40mg daily, SCD's, encourage ambulation     - Patient/plan discussed with Dr. Schmitz    Disposition: Continue care on RNF. Will remain inpatient with plans for IR 5/30 for aspiration and cultures, continued antibiotic therapy. Further dispo pending IR tomorrow.    SKYLAR Wright-CNP  Plastic and Reconstructive Surgery   Available via Epic chat, pager: [phone] or team phones: [phone]          [1] acetic acid, , irrigation, Daily  ciprofloxacin, 400 mg, intravenous, q12h  enoxaparin, 40 mg, subcutaneous, q24h  fluticasone furoate-vilanteroL, 1 puff, inhalation, Daily  metroNIDAZOLE, 500 mg, intravenous, q8h  montelukast, 10 mg, oral, q AM  vancomycin, 1,000 mg, intravenous, q12h     [2]    [3] PRN medications: cyclobenzaprine, diphenhydrAMINE, HYDROcodone-acetaminophen,  ondansetron, vancomycin

## 2025-05-30 NOTE — CONSULTS
Vancomycin Dosing by Pharmacy- FOLLOW UP    Alisha Springer is a 59 y.o. year old female who Pharmacy has been consulted (continuation) for vancomycin dosing for surgical wound infection . Based on the patient's indication and renal status this patient will be dosed based on a goal AUC of 400-600.     Renal function is currently stable.    Current vancomycin dose: 1000 mg given every 12 hours    Estimated vancomycin AUC on current dose: 512 mg/L.hr     Visit Vitals  /75   Pulse 78   Temp 36.5 °C (97.7 °F) (Temporal)   Resp 16        Lab Results   Component Value Date    CREATININE 0.60 2025    CREATININE 0.55 2025    CREATININE 0.52 2025    CREATININE 0.37 (L) 2025        Patient weight is as follows:   Vitals:    25 0600   Weight: 69.8 kg (153 lb 14.1 oz)       Cultures:  Susceptibility data for the encounter in last 14 days.  Collected Specimen Info Organism Amoxicillin/Clavulanate Ampicillin Ampicillin/Sulbactam Aztreonam Cefazolin Cefepime Ceftazidime Ceftriaxone Ciprofloxacin Ertapenem Gentamicin Imipenem Levofloxacin Meropenem   25 Tissue/Biopsy from Wound/Tissue Pseudomonas aeruginosa     S   S  S   S     S      Morganella morganii  R  R  R   R    S  S  S  I  I  S  S     Mixed Anaerobic Bacteria                   Collected Specimen Info Organism Piperacillin/Tazobactam Tobramycin Trimethoprim/Sulfamethoxazole   25 Tissue/Biopsy from Wound/Tissue Pseudomonas aeruginosa  S  S      Morganella morganii  S  S  S     Mixed Anaerobic Bacteria           I/O last 3 completed shifts:  In: 500 (7.2 mL/kg) [P.O.:200; IV Piggyback:300]  Out: 3600 (51.6 mL/kg) [Urine:3600 (1.4 mL/kg/hr)]  Weight: 69.8 kg   I/O during current shift:  I/O this shift:  In: 200 [P.O.:200]  Out: 720 [Urine:700; Drains:20]    Temp (24hrs), Av.8 °C (98.3 °F), Min:36.5 °C (97.7 °F), Max:37.2 °C (98.9 °F)      Assessment/Plan    Within goal AUC range. Continue current vancomycin regimen.    This  dosing regimen is predicted by [employer] to result in the following pharmacokinetic parameters:  Loading dose: N/A  Regimen: 1000 mg IV every 12 hours.  Start time: 23:38 on 05/30/2025  Exposure target: AUC24 (range) 400-600 mg/L.hr   LQB41-13: 501 mg/L.hr  AUC24,ss: 512 mg/L.hr  Probability of AUC24 > 400: 100 %  Ctrough,ss: 15 mg/L  Probability of Ctrough,ss > 20: 2 %    The next level will be obtained on 6/2 at AM labs. May be obtained sooner if clinically indicated.   Will continue to monitor renal function daily while on vancomycin and order serum creatinine at least every 48 hours if not already ordered.  Follow for continued vancomycin needs, clinical response, and signs/symptoms of toxicity.       Best Beltrán, PharmD

## 2025-05-31 LAB
ANION GAP SERPL CALC-SCNC: 13 MMOL/L (ref 10–20)
BUN SERPL-MCNC: 18 MG/DL (ref 6–23)
CALCIUM SERPL-MCNC: 8.5 MG/DL (ref 8.6–10.6)
CHLORIDE SERPL-SCNC: 106 MMOL/L (ref 98–107)
CO2 SERPL-SCNC: 26 MMOL/L (ref 21–32)
CREAT SERPL-MCNC: 0.52 MG/DL (ref 0.5–1.05)
CRP SERPL-MCNC: 2.63 MG/DL
EGFRCR SERPLBLD CKD-EPI 2021: >90 ML/MIN/1.73M*2
ERYTHROCYTE [DISTWIDTH] IN BLOOD BY AUTOMATED COUNT: 13.5 % (ref 11.5–14.5)
ERYTHROCYTE [SEDIMENTATION RATE] IN BLOOD BY WESTERGREN METHOD: 38 MM/H (ref 0–30)
GLUCOSE SERPL-MCNC: 104 MG/DL (ref 74–99)
HCT VFR BLD AUTO: 37 % (ref 36–46)
HGB BLD-MCNC: 11.5 G/DL (ref 12–16)
MCH RBC QN AUTO: 29.6 PG (ref 26–34)
MCHC RBC AUTO-ENTMCNC: 31.1 G/DL (ref 32–36)
MCV RBC AUTO: 95 FL (ref 80–100)
NRBC BLD-RTO: 0 /100 WBCS (ref 0–0)
PLATELET # BLD AUTO: 369 X10*3/UL (ref 150–450)
POTASSIUM SERPL-SCNC: 4.2 MMOL/L (ref 3.5–5.3)
RBC # BLD AUTO: 3.88 X10*6/UL (ref 4–5.2)
SODIUM SERPL-SCNC: 141 MMOL/L (ref 136–145)
WBC # BLD AUTO: 9.2 X10*3/UL (ref 4.4–11.3)

## 2025-05-31 PROCEDURE — 85027 COMPLETE CBC AUTOMATED: CPT

## 2025-05-31 PROCEDURE — 80048 BASIC METABOLIC PNL TOTAL CA: CPT

## 2025-05-31 PROCEDURE — 86140 C-REACTIVE PROTEIN: CPT

## 2025-05-31 PROCEDURE — 99233 SBSQ HOSP IP/OBS HIGH 50: CPT

## 2025-05-31 PROCEDURE — 85652 RBC SED RATE AUTOMATED: CPT

## 2025-05-31 PROCEDURE — 2500000004 HC RX 250 GENERAL PHARMACY W/ HCPCS (ALT 636 FOR OP/ED)

## 2025-05-31 PROCEDURE — 36415 COLL VENOUS BLD VENIPUNCTURE: CPT

## 2025-05-31 PROCEDURE — 94640 AIRWAY INHALATION TREATMENT: CPT

## 2025-05-31 PROCEDURE — 2500000005 HC RX 250 GENERAL PHARMACY W/O HCPCS: Performed by: NURSE PRACTITIONER

## 2025-05-31 PROCEDURE — 2500000001 HC RX 250 WO HCPCS SELF ADMINISTERED DRUGS (ALT 637 FOR MEDICARE OP)

## 2025-05-31 PROCEDURE — 2500000004 HC RX 250 GENERAL PHARMACY W/ HCPCS (ALT 636 FOR OP/ED): Mod: JZ

## 2025-05-31 PROCEDURE — 2500000004 HC RX 250 GENERAL PHARMACY W/ HCPCS (ALT 636 FOR OP/ED): Mod: JZ | Performed by: NURSE PRACTITIONER

## 2025-05-31 PROCEDURE — 1170000001 HC PRIVATE ONCOLOGY ROOM DAILY

## 2025-05-31 PROCEDURE — 99232 SBSQ HOSP IP/OBS MODERATE 35: CPT | Performed by: INTERNAL MEDICINE

## 2025-05-31 RX ORDER — ONDANSETRON HYDROCHLORIDE 2 MG/ML
4 INJECTION, SOLUTION INTRAVENOUS EVERY 8 HOURS
Status: DISCONTINUED | OUTPATIENT
Start: 2025-05-31 | End: 2025-06-06 | Stop reason: HOSPADM

## 2025-05-31 RX ORDER — KETOROLAC TROMETHAMINE 30 MG/ML
30 INJECTION, SOLUTION INTRAMUSCULAR; INTRAVENOUS EVERY 6 HOURS SCHEDULED
Status: DISPENSED | OUTPATIENT
Start: 2025-05-31 | End: 2025-06-05

## 2025-05-31 RX ORDER — CYCLOBENZAPRINE HCL 10 MG
5 TABLET ORAL 3 TIMES DAILY
Status: DISCONTINUED | OUTPATIENT
Start: 2025-05-31 | End: 2025-06-06 | Stop reason: HOSPADM

## 2025-05-31 RX ORDER — MORPHINE SULFATE 2 MG/ML
1 INJECTION, SOLUTION INTRAMUSCULAR; INTRAVENOUS ONCE
Status: COMPLETED | OUTPATIENT
Start: 2025-05-31 | End: 2025-05-31

## 2025-05-31 RX ADMIN — METRONIDAZOLE 500 MG: 500 INJECTION, SOLUTION INTRAVENOUS at 08:32

## 2025-05-31 RX ADMIN — LIDOCAINE 4% 1 PATCH: 40 PATCH TOPICAL at 08:33

## 2025-05-31 RX ADMIN — METRONIDAZOLE 500 MG: 500 INJECTION, SOLUTION INTRAVENOUS at 22:33

## 2025-05-31 RX ADMIN — KETOROLAC TROMETHAMINE 30 MG: 30 INJECTION, SOLUTION INTRAMUSCULAR; INTRAVENOUS at 06:14

## 2025-05-31 RX ADMIN — MONTELUKAST 10 MG: 10 TABLET, FILM COATED ORAL at 08:33

## 2025-05-31 RX ADMIN — MORPHINE SULFATE 1 MG: 2 INJECTION, SOLUTION INTRAMUSCULAR; INTRAVENOUS at 21:21

## 2025-05-31 RX ADMIN — KETOROLAC TROMETHAMINE 30 MG: 30 INJECTION, SOLUTION INTRAMUSCULAR; INTRAVENOUS at 17:03

## 2025-05-31 RX ADMIN — CIPROFLOXACIN 400 MG: 400 INJECTION, SOLUTION INTRAVENOUS at 11:06

## 2025-05-31 RX ADMIN — FLUTICASONE FUROATE AND VILANTEROL TRIFENATATE 1 PUFF: 100; 25 POWDER RESPIRATORY (INHALATION) at 09:42

## 2025-05-31 RX ADMIN — KETOROLAC TROMETHAMINE 30 MG: 30 INJECTION, SOLUTION INTRAMUSCULAR; INTRAVENOUS at 12:47

## 2025-05-31 RX ADMIN — ACETIC ACID 1000 ML: 250 IRRIGANT IRRIGATION at 08:32

## 2025-05-31 RX ADMIN — VANCOMYCIN HYDROCHLORIDE 1000 MG: 1 INJECTION, SOLUTION INTRAVENOUS at 12:47

## 2025-05-31 RX ADMIN — CYCLOBENZAPRINE HYDROCHLORIDE 5 MG: 10 TABLET, FILM COATED ORAL at 21:21

## 2025-05-31 RX ADMIN — VANCOMYCIN HYDROCHLORIDE 1000 MG: 1 INJECTION, SOLUTION INTRAVENOUS at 00:24

## 2025-05-31 RX ADMIN — CYCLOBENZAPRINE HYDROCHLORIDE 5 MG: 10 TABLET, FILM COATED ORAL at 06:16

## 2025-05-31 RX ADMIN — ENOXAPARIN SODIUM 40 MG: 100 INJECTION SUBCUTANEOUS at 22:27

## 2025-05-31 RX ADMIN — ONDANSETRON 4 MG: 2 INJECTION INTRAMUSCULAR; INTRAVENOUS at 22:27

## 2025-05-31 RX ADMIN — CIPROFLOXACIN 400 MG: 400 INJECTION, SOLUTION INTRAVENOUS at 23:32

## 2025-05-31 RX ADMIN — HYDROCODONE BITARTRATE AND ACETAMINOPHEN 1 TABLET: 5; 325 TABLET ORAL at 06:59

## 2025-05-31 RX ADMIN — CYCLOBENZAPRINE HYDROCHLORIDE 5 MG: 10 TABLET, FILM COATED ORAL at 15:29

## 2025-05-31 RX ADMIN — METRONIDAZOLE 500 MG: 500 INJECTION, SOLUTION INTRAVENOUS at 14:53

## 2025-05-31 ASSESSMENT — COGNITIVE AND FUNCTIONAL STATUS - GENERAL
DAILY ACTIVITIY SCORE: 24
MOBILITY SCORE: 24

## 2025-05-31 ASSESSMENT — PAIN - FUNCTIONAL ASSESSMENT
PAIN_FUNCTIONAL_ASSESSMENT: 0-10

## 2025-05-31 ASSESSMENT — PAIN SCALES - GENERAL
PAINLEVEL_OUTOF10: 0 - NO PAIN
PAINLEVEL_OUTOF10: 8
PAINLEVEL_OUTOF10: 7
PAINLEVEL_OUTOF10: 4
PAINLEVEL_OUTOF10: 0 - NO PAIN
PAINLEVEL_OUTOF10: 2
PAINLEVEL_OUTOF10: 0 - NO PAIN

## 2025-05-31 ASSESSMENT — PAIN DESCRIPTION - LOCATION: LOCATION: ABDOMEN

## 2025-05-31 ASSESSMENT — PAIN DESCRIPTION - DESCRIPTORS
DESCRIPTORS: BURNING;ACHING
DESCRIPTORS: ACHING

## 2025-05-31 NOTE — PROGRESS NOTES
"  Department of Plastic and Reconstructive Surgery  Daily Progress Note    Patient Name: Alisha Springer  MRN: [id number]  Date:  05/31/25     Subjective  Continues to endorse tenderness to midline abdomen wound, particularly tender to L aspect of wound, which is worsened during dressing changes. Continuing BID Acetic Acid dressing changes to abdominal wound. Denies any fever, chills, night sweats, CP, SOB, palpitations, vomiting, diarrhea, constipation or dysuria.      Overnight Events  None     Objective    Vital Signs  /62 (BP Location: Right arm, Patient Position: Lying)   Pulse 78   Temp 37 °C (98.6 °F) (Temporal)   Resp 16   Ht 1.6 m (5' 3\")   Wt 70.1 kg (154 lb 8.7 oz)   SpO2 95%   BMI 27.38 kg/m²      Physical Exam:  Constitutional: A&Ox3, calm and cooperative, NAD.  Eyes: PERRL, EOMI  ENMT: Moist mucous membranes, no apparent injuries or lesions.  Head/Neck: NC/AT.   Cardiovascular: Normal rate and regular rhythm.  Respiratory/Thorax: Regular respirations on RA. Good symmetric chest expansion.   Gastrointestinal: Abdomen soft, tender to touch, surrounding midline wound, pink granulation tissue with white/yellow fibrinous exudate present in lower lateral right wound bed, acetic acid WTD dressing removed and replaced on exam. Improving surrounding erythema compared to previous exams. Remaining lower abdominal incision is c/d/I with no dressing in place.   Genitourinary: voiding independently   Extremities: No peripheral edema. Moving all 4 extremities actively.   Neurological: A&Ox3.   Psychological: Appropriate mood and behavior.      Diagnostics   Results for orders placed or performed during the hospital encounter of 05/24/25 (from the past 24 hours)   Sterile Fluid Culture/Smear    Specimen: Aspirate; Fluid   Result Value Ref Range    Sterile Fluid Culture/Smear No growth to date     Gram Stain (2+) Few Polymorphonuclear leukocytes     Gram Stain No organisms seen    Vancomycin   Result Value Ref " Range    Vancomycin 20.7 (H) 5.0 - 20.0 ug/mL   CBC   Result Value Ref Range    WBC 9.2 4.4 - 11.3 x10*3/uL    nRBC 0.0 0.0 - 0.0 /100 WBCs    RBC 3.88 (L) 4.00 - 5.20 x10*6/uL    Hemoglobin 11.5 (L) 12.0 - 16.0 g/dL    Hematocrit 37.0 36.0 - 46.0 %    MCV 95 80 - 100 fL    MCH 29.6 26.0 - 34.0 pg    MCHC 31.1 (L) 32.0 - 36.0 g/dL    RDW 13.5 11.5 - 14.5 %    Platelets 369 150 - 450 x10*3/uL   Basic metabolic panel   Result Value Ref Range    Glucose 104 (H) 74 - 99 mg/dL    Sodium 141 136 - 145 mmol/L    Potassium 4.2 3.5 - 5.3 mmol/L    Chloride 106 98 - 107 mmol/L    Bicarbonate 26 21 - 32 mmol/L    Anion Gap 13 10 - 20 mmol/L    Urea Nitrogen 18 6 - 23 mg/dL    Creatinine 0.52 0.50 - 1.05 mg/dL    eGFR >90 >60 mL/min/1.73m*2    Calcium 8.5 (L) 8.6 - 10.6 mg/dL   C-reactive protein   Result Value Ref Range    C-Reactive Protein 2.63 (H) <1.00 mg/dL   Sedimentation rate, automated   Result Value Ref Range    Sedimentation Rate 38 (H) 0 - 30 mm/h     Imaging  US guided percutaneous peritoneal or retroperitoneal fluid collection drainage  Result Date: 5/30/2025  Uneventful ultrasound guided 8 German pigtail drainage catheter placement, as detailed above.   I was present for and/or performed the critical portions of the procedure and immediately available throughout the entire procedure.   I personally reviewed the images/study and I agree with the resident Wai Nguyen's findings as stated. This study was interpreted at [facility], [place], [place].   Performed and dictated at [facility].   MACRO: None   Signed by: Carmen Dotson 5/30/2025 6:38 PM Dictation workstation:   [id number]    CT abdomen pelvis w IV contrast  Result Date: 5/24/2025  IMPRESSION: Right renal cyst not excluded. Appendix not identified. Moderate stool burden. Soft tissue stranding in the anterior abdominal wall with air bubbles. Large fluid collection along the right  lateral aspect of the pelvis.   : VISH   Transcribe Date/Time: May 24 2025  6:27P Dictated by : JORDI BHATIA MD This examination was interpreted and the report reviewed and electronically signed by: JORDI BHATIA MD on May 24 2025  6:39PM  EST      Cardiology, Vascular, and Other Imaging  CT abdomen pelvis w IV contrast  Result Date: 5/24/2025  * * *Final Report* * * DATE OF EXAM: May 24 2025  4:04PM   [facility]   0530  -  CT ABD/PEL W IVCON  / ACCESSION #  [id number] PROCEDURE REASON: Abdominal abscess/infection suspected      * * * * Physician Interpretation * * * *  EXAMINATION:  CT ABDOMEN AND PELVIS WITH IV CONTRAST CLINICAL HISTORY: Lower abdominal pain.  A recent hernia and bladder prolapse surgery. TECHNIQUE: CT of the abdomen and pelvis was performed using standard technique, scanning from just above the dome of the diaphragm to the symphysis pubis. MQ:  CTAP_3 Contrast: IV:  100 ml of Omnipaque 350 CT Radiation dose: Integrated Dose-length product (DLP) for this visit =   628 mGy*cm. CT Dose Reduction Employed: Automated exposure control(AEC) and iterative recon COMPARISON: CT abdomen pelvis on 01/18/2025 RESULT: Liver: A new low-attenuation lesions or cysts visualized measuring up to 7 mm, unchanged. Biliary: No bile duct dilation.  No CT evidence of gallbladder stones. Spleen: No mass. No splenomegaly. Pancreas: No mass or duct dilation. Adrenals: No mass. Kidneys: There is a questionable parapelvic cyst in the right kidney.  No hydroureteronephrosis. GI tract: No dilation or wall thickening. The appendix is not identified.  No diverticulitis.  Moderate amount of stool and gas noted in the large bowel loops. Lymph nodes: No abdominal or pelvic lymphadenopathy. Mesentery/Peritoneum: No ascites or mass or free abdominal air. Retroperitoneum: No mass.   Vasculature: The celiac artery, SMA, YOVANI, portal veins and hepatic veins are patent.  No AAA. Pelvis: No mass, ascites or fluid collection. The uterus  is surgically absent. Bones/Soft Tissues: Soft tissue stranding seen in the anterior aspect of the abdominal wall, with multiple air bubbles.  Also noted is a 3.6 x 8.3 cm fluid collection along the right lateral aspect of the pelvis, series 2 image 71.  Hernia mesh visualized.  The spine shows mild degenerative changes. Lower thorax: No pleural effusions.  Dependent atelectasis seen in the bilateral lower lobes.  Patient is status post fundoplication. Localizer images: No additional findings.    IMPRESSION: Right renal cyst not excluded. Appendix not identified. Moderate stool burden. Soft tissue stranding in the anterior abdominal wall with air bubbles. Large fluid collection along the right lateral aspect of the pelvis.   : PSCB   Transcribe Date/Time: May 24 2025  6:27P Dictated by : JORDI BHATIA MD This examination was interpreted and the report reviewed and electronically signed by: JORDI BHATIA MD on May 24 2025  6:39PM  EST        Current Medications  Scheduled medications  Scheduled Medications[1]  Continuous medications  Continuous Medications[2]  PRN medications  PRN Medications[3]     Assessment  Alisha Springer is a 59 y.o. female with a past medical history of asthma, GERD s/p fundoplication, depression, endometriosis, nephrolithiasis, rectocele, female cystocele who is s/p [id number] panniculectomy, ventral hernia repair, sacrospinous ligament suspension for cystocele on 05/12/2025 with Dr. Allison and Dr. Thompson who presented to the ED as a transfer from [facility] for admission under plastic surgery for surgical wound infection of her abdomen. At [facility], CT abdomen pelvis showed soft tissue stranding in the abdominal wall with multiple air bubbles, and large fluid collection along the right lateral aspect of the pelvis. On arrival, patient was hemodynamically stable. She has complaints of extreme nausea without vomiting, significant pain at her abdomen, fever and chills overnight. AN  excision debridement at bedside with application of irrigating wound vac was placed. She was admitted under plastic surgery service for IV antibiotics and continued wound care with irrigating wound vac.     Plan/Recommendations  S/P daniele panniculectomy and ventral hernia repair with Dr. Allison 05/12/2025  #Wound infection  - 5/31 white/yellow fibrinous exudate present in wound bed which was removed via bedside debridement- see procedure note  - Bedside wound cultures obtained 5/25:       ·  4+ abundant morganella morganii, 2+ few pseudomonas aeruginosa, 4+ Abundant polymorphonuclear leukocytes, 4+ abundant mixed gram positive and gram negative bacteria  - ID consulted, appreciate recs       · IV Vancomycin       · IV Flagyl 500 TID       · IV Cipro 400 BID  - Continue to monitor labs, CRP 2.63, ESR 38  - Incsional dressing removed per attending       ·  Replaced with BID WTD Acetic acid dressing changes              · Plastics to do AM dressing, nursing to perform PM dressing    - Vital signs Q4  - IR confirmed we can flush IR drain. Use 100cc of sterile betadine and 300 ml of NS (make sure betadine is completely flushed out) per attending  - No need for further imaging needed at this time per attending  - Continuing BID Acetic Acid dressing changes to abdominal wound     #Acute pain  - Continue flexeril 5mg TID scheudled for muscle spasms  - Continue Lidocaine patches   - DC norco  - Zofran 4mg PRN for nausea/vomiting 2/2 pain medications     #Asthma  - Continue home Breo Ellipta and Singulair  - Stable, consult RT as needed    F: none, encourage PO intake  E: monitor and replete PRN  N: regular diet  GI: Colace BID   Dvt ppx: lovenox 40mg daily, SCD's, encourage ambulation     - Patient/plan discussed with Dr. Schmitz    Disposition: Continue care on RNF. Will remain inpatient with plans for continued antibiotic therapy, pending cultures, and drain care.    Cuca Ibanez PA-C  Plastic and Reconstructive  Surgery   Available via Epic chat, pager: [phone] or team phones: [phone]          [1] acetic acid, , irrigation, Daily  ciprofloxacin, 400 mg, intravenous, q12h  enoxaparin, 40 mg, subcutaneous, q24h  fluticasone furoate-vilanteroL, 1 puff, inhalation, Daily  ketorolac, 30 mg, intravenous, q6h DIONI  lidocaine, 1 patch, transdermal, Daily  metroNIDAZOLE, 500 mg, intravenous, q8h  montelukast, 10 mg, oral, q AM  vancomycin, 1,000 mg, intravenous, q12h     [2]    [3] PRN medications: cyclobenzaprine, diphenhydrAMINE, HYDROcodone-acetaminophen, ondansetron, vancomycin

## 2025-05-31 NOTE — PROCEDURES
General    Date/Time: 5/31/2025 3:22 PM    Performed by: Cuca Ibanez PA-C  Authorized by: Cuca Ibanez PA-C    Consent:     Consent obtained:  Verbal    Consent given by:  Patient    Risks discussed:  Bleeding, pain and infection  Universal protocol:     Procedure explained and questions answered to patient or proxy's satisfaction: yes      Patient identity confirmed:  Verbally with patient, hospital-assigned identification number and arm band  Indications:     Indications:  Fibrinous tissue overlying  Sedation:     Sedation type:  None  Anesthesia:     Anesthesia method:  None  Procedure specific details:      Bedside debridement of abdominal wound. Acetic Acid dressing and mepilex was applied post procedure   Post-procedure details:     Procedure completion:  Tolerated well, no immediate complications        Cuca Ibanez PA-C  Plastic and Reconstructive Surgery   [place]  Pager #[phone]  Team phones: [phone]

## 2025-05-31 NOTE — SIGNIFICANT EVENT
"  Department of Plastic and Reconstructive Surgery  Post IR drain placement      Patient Name: Alisha Springer  MRN: [id number]  Date:  05/29/25      Subjective  Found resting in bed comfortably on exam. Continues to c/o some mild tenderness to midline abdomen wound, particularly tender to L aspect of wound and now having some pain to IR drain site. Feels overall less fatigued and nausea resolved with improving appetite. Denies any fever, chills, night sweats, CP, SOB, palpitations, nausea, vomiting, diarrhea, constipation, dysuria, hematuria, hematochezia, hematemesis, flank pain.      Objective   BP 96/56 (BP Location: Left arm, Patient Position: Lying)   Pulse 69   Temp 37 °C (98.6 °F) (Temporal)   Resp 16   Ht 1.6 m (5' 3\")   Wt 69.8 kg (153 lb 14.1 oz)   SpO2 95%   BMI 27.26 kg/m²       Physical Exam:  Constitutional: A&Ox3, calm and cooperative, NAD.  Eyes: PERRL, EOMI  ENMT: Moist mucous membranes, no apparent injuries or lesions.  Head/Neck: NC/AT.   Cardiovascular: Normal rate and regular rhythm. 2+ equal pulses of the distal extremities.  Respiratory/Thorax: Regular respirations on RA. Good symmetric chest expansion.   Gastrointestinal: Abdomen soft, tender surrounding midline wound, pink granulation tissue with yellow/green fibrinous exudate present in wound bed, acetic acid WTD dressing in place. Improving surrounding erythema compared to previous exams. Remaining lower abdominal incision is c/d/I with sylke dressing in place. IR drain in place to RLQ with milky ss output in drain.    Genitourinary: voiding independently   Extremities: No peripheral edema. Moving all 4 extremities actively.   Neurological: A&Ox3.   Psychological: Appropriate mood and behavior.     Plan/Recommendations  S/P daniele panniculectomy and ventral hernia repair with Dr. Allison 05/12/2025, s/p IR drain placement:  #Wound infection  - IR placed drain today, aspirated 5 ml of brown purulent fluid and sent for " cytology/micro, follow up cultures  - IR drain to remain in place, confirm with IR during day shift about flushes with 10 ml Q8  - continue IV abx per ID  - order lidocaine patch around IR drain site for tomorrow am  - all other care per daily progress note  - Updated SKYLAR Askew-CNP  Plastic and Reconstructive Surgery   Available via [employer]  Pager #[phone]  Team phone: [phone]

## 2025-05-31 NOTE — PROGRESS NOTES
"Alisha Springer \"Talia" is a 59 y.o. female on day 7 of admission presenting with Wound dehiscence.    Subjective   Interval History:   Patient feels a little better but still with tenderness around wound edge, especially durine dressing changes. States that she is to have more bedside debridement and is anxious that it will be done without pain medication. Minimal debridement today was very painful.  Having nausea since admission. No emesis, diarrhea, or constipation.  Thinks Right thigh edema is a little better. Remains tender  Had US guided drain placed into her superficial flank collection. 0.5cc  of brown, purulent material sent for culture.  Reports some soreness at site. SUNSHINE bulb in place with cloudy, serosanguinous fluid in place        Objective   Range of Vitals (last 24 hours)  Heart Rate:  [68-81]   Temp:  [36.5 °C (97.7 °F)-37 °C (98.6 °F)]   Resp:  [16-18]   BP: ()/(55-76)   Weight:  [70.1 kg (154 lb 8.7 oz)]   SpO2:  [94 %-100 %]   Daily Weight  05/31/25 : 70.1 kg (154 lb 8.7 oz)    Body mass index is 27.38 kg/m².    Physical Exam    CONSTITUTIONAL: Well appearing but appears tired, NAD, cooperative  SKIN:  No rashes or lesions. Right thigh induration persists.  Less erythema compared with photos but remains within the fading, marked borders. I reapplied outline. Remains warm to touch.  EYES: PERRLA, EOMI, Sclera anicteric.  No conjunctival injection.    ENMT: MMM, No oral lesions or thrush. Dentition in good repair.    CVS: RRR, S1 & S2 normal.  No murmurs, rubs or gallops.  Pedal pulses intact.  RESPIRATORY/CHEST: Clear in anterolateral lung fields.  No rales or rhonchi  GI: Soft, ND.  Tender around abdominal dressing and around abdominal drain.  Wound dressing not removed due to her discomfort. Photos reviewed in media tab.  No rebound or guarding  EXT: No CCE.     Antibiotics  ciprofloxacin - 400 mg/200 mL  metroNIDAZOLE - 500 mg/100 mL  vancomycin - 1 gram/200 mL    Relevant " Results  Labs  Results from last 72 hours   Lab Units 05/31/25  0619 05/30/25  0543 05/29/25  0603   WBC AUTO x10*3/uL 9.2 11.4* 11.3   HEMOGLOBIN g/dL 11.5* 11.1* 11.3*   HEMATOCRIT % 37.0 35.1* 34.7*   PLATELETS AUTO x10*3/uL 369 348 359     Results from last 72 hours   Lab Units 05/31/25  0619 05/30/25  0543 05/29/25  0603   SODIUM mmol/L 141 141 139   POTASSIUM mmol/L 4.2 4.9 4.0   CHLORIDE mmol/L 106 106 107   CO2 mmol/L 26 27 24   BUN mg/dL 18 21 18   CREATININE mg/dL 0.52 0.60 0.55   GLUCOSE mg/dL 104* 103* 97   CALCIUM mg/dL 8.5* 8.2* 8.3*   ANION GAP mmol/L 13 13 12   EGFR mL/min/1.73m*2 >90 >90 >90   PHOSPHORUS mg/dL  --   --  4.4     Results from last 72 hours   Lab Units 05/29/25  0603   ALBUMIN g/dL 2.7*     Estimated Creatinine Clearance: 109.4 mL/min (by C-G formula based on SCr of 0.52 mg/dL).  C-Reactive Protein   Date Value Ref Range Status   05/31/2025 2.63 (H) <1.00 mg/dL Final   05/30/2025 4.12 (H) <1.00 mg/dL Final   05/29/2025 4.55 (H) <1.00 mg/dL Final     Microbiology  Susceptibility data from last 14 days.  Collected Specimen Info Organism Amoxicillin/Clavulanate Ampicillin Ampicillin/Sulbactam Aztreonam Cefazolin Cefepime Ceftazidime Ceftriaxone Ciprofloxacin Ertapenem Gentamicin Imipenem Levofloxacin Meropenem   05/25/25 Tissue/Biopsy from Wound/Tissue Pseudomonas aeruginosa     S   S  S   S     S      Morganella morganii  R  R  R   R    S  S  S  I  I  S  S     Mixed Anaerobic Bacteria                   Collected Specimen Info Organism Piperacillin/Tazobactam Tobramycin Trimethoprim/Sulfamethoxazole   05/25/25 Tissue/Biopsy from Wound/Tissue Pseudomonas aeruginosa  S  S      Morganella morganii  S  S  S     Mixed Anaerobic Bacteria          Imaging     US guided percutaneous peritoneal or retroperitoneal fluid collection drainage  5/30/25  FINDINGS:  Limited ultrasonographic images were obtained through the right  lateral pelvic subcutaneous tissues for the purposes of needle  guidance  were taken. The images demonstrate a complex right pelvic  subcutaneous fluid collection which was selected for drainage.      Patient was placed in supine position and prepped in the usual  sterile manner. Lidocaine was used for local anesthesia. Utilizing  intermittent CT guidance a 19G Yueh needle was used to access the  fluid collection. Approximately 5 cc brown purulent fluid was  aspirated. A 035 wire was then used to maintain access in the  collection after removal of the Yueh needle. An 8 Indonesian pigtail  drainage catheter was placed in the collection. The pigtail drain was  formed, connected to drain, and secured in place with a Stay Fix  dressing.      Fluid sample was sent to the laboratory for further analysis.      The patient tolerated the procedure well without any immediate  complications.      IMPRESSION:  Uneventful ultrasound guided 8 Indonesian pigtail drainage catheter  placement, as detailed above.    Assessment/Plan   59-year-old woman who lost a substantial amount of weight during her conversion to a healthy lifestyle of exercise, and eating right.  Quite an inspiring story of motivation to be better for her family and friends.     On 5/12/2025 patient underwent panniculectomy and ventral abdominal wall hernia repair with bioabsorbable mesh.  SUNSHINE drains were removed 5/19/2025.  At that time there was suspicion of possible superficial ischemic changes in the umbilical area.  Patient ultimately developed drainage and then presented on 5/24/2025 with purulent malodorous drainage.  Patient had bedside I&D on 5/25/25 and was started on IV vancomycin, metronidazole and ciprofloxacin, which have been well-tolerated.  In the past, she had received levofloxacin.  Cultures have grown mixed anaerobic bacteria, pan-susceptible Pseudomonas aeruginosa and Morganella morganii (susceptibilities above).  On 5/31, she underwent drain placement into right lateral flank fluid collection that is underlying the area of  upper thigh/pelvis induration. Initial aspirate was brown, purulent material.    Today, 5/31, she reports nausea and no appetite (since admission). Could possibly be due to Metronidazole.     Polymicrobial postoperative panniculectomy wound infection with secondary fluid collection in the lateral flank (film not available to review)     5/25 Bedside debridement sample sent to laboratory      Culture growing mixed anaerobes, Pseudomonas and Morganella     Continue current antibiotics.  Hoping to have oral regimen to treat.             RECOMMENDATIONS:   Continue current antibiotic given allergy to PCN/Cefazolin.  Start zofran for nausea (30 minutes before Metronidazole infusion). If not helpful, may need to trial IV Meropenem      This is a complex infectious disease issue and the following was performed today (for more details please see the above note):   Management decisions reflecting the added complexity (e.g., changes in antimicrobial therapy, infection control strategies). and Public health investigation, analysis, and/or testing (e.g., review of resistance patterns or lab results).    I spent 45 minutes in the professional and overall care of this patient.      Shakira Underwood MD  (please reach through EPIC Chat)  Infectious Diseases, Senior Attending Physician

## 2025-06-01 VITALS
HEART RATE: 79 BPM | TEMPERATURE: 97.5 F | BODY MASS INDEX: 27.93 KG/M2 | WEIGHT: 157.63 LBS | HEIGHT: 63 IN | RESPIRATION RATE: 16 BRPM | SYSTOLIC BLOOD PRESSURE: 96 MMHG | DIASTOLIC BLOOD PRESSURE: 60 MMHG | OXYGEN SATURATION: 95 %

## 2025-06-01 LAB
ANION GAP SERPL CALC-SCNC: 12 MMOL/L (ref 10–20)
BACTERIA FLD CULT: NORMAL
BUN SERPL-MCNC: 23 MG/DL (ref 6–23)
CALCIUM SERPL-MCNC: 8.2 MG/DL (ref 8.6–10.6)
CHLORIDE SERPL-SCNC: 108 MMOL/L (ref 98–107)
CO2 SERPL-SCNC: 23 MMOL/L (ref 21–32)
CREAT SERPL-MCNC: 0.56 MG/DL (ref 0.5–1.05)
CRP SERPL-MCNC: 1.69 MG/DL
EGFRCR SERPLBLD CKD-EPI 2021: >90 ML/MIN/1.73M*2
ERYTHROCYTE [DISTWIDTH] IN BLOOD BY AUTOMATED COUNT: 13.3 % (ref 11.5–14.5)
ERYTHROCYTE [SEDIMENTATION RATE] IN BLOOD BY WESTERGREN METHOD: 20 MM/H (ref 0–30)
GLUCOSE SERPL-MCNC: 94 MG/DL (ref 74–99)
GRAM STN SPEC: NORMAL
GRAM STN SPEC: NORMAL
HCT VFR BLD AUTO: 39 % (ref 36–46)
HGB BLD-MCNC: 11.1 G/DL (ref 12–16)
MCH RBC QN AUTO: 29.1 PG (ref 26–34)
MCHC RBC AUTO-ENTMCNC: 28.5 G/DL (ref 32–36)
MCV RBC AUTO: 102 FL (ref 80–100)
NRBC BLD-RTO: 0 /100 WBCS (ref 0–0)
PLATELET # BLD AUTO: 282 X10*3/UL (ref 150–450)
POTASSIUM SERPL-SCNC: 4.3 MMOL/L (ref 3.5–5.3)
RBC # BLD AUTO: 3.82 X10*6/UL (ref 4–5.2)
SODIUM SERPL-SCNC: 139 MMOL/L (ref 136–145)
WBC # BLD AUTO: 7.5 X10*3/UL (ref 4.4–11.3)

## 2025-06-01 PROCEDURE — 1170000001 HC PRIVATE ONCOLOGY ROOM DAILY

## 2025-06-01 PROCEDURE — 2500000004 HC RX 250 GENERAL PHARMACY W/ HCPCS (ALT 636 FOR OP/ED): Mod: JZ | Performed by: NURSE PRACTITIONER

## 2025-06-01 PROCEDURE — 2500000004 HC RX 250 GENERAL PHARMACY W/ HCPCS (ALT 636 FOR OP/ED)

## 2025-06-01 PROCEDURE — 80048 BASIC METABOLIC PNL TOTAL CA: CPT

## 2025-06-01 PROCEDURE — 2500000004 HC RX 250 GENERAL PHARMACY W/ HCPCS (ALT 636 FOR OP/ED): Mod: JZ

## 2025-06-01 PROCEDURE — 2500000005 HC RX 250 GENERAL PHARMACY W/O HCPCS: Performed by: NURSE PRACTITIONER

## 2025-06-01 PROCEDURE — 99232 SBSQ HOSP IP/OBS MODERATE 35: CPT

## 2025-06-01 PROCEDURE — 2500000001 HC RX 250 WO HCPCS SELF ADMINISTERED DRUGS (ALT 637 FOR MEDICARE OP)

## 2025-06-01 PROCEDURE — 86140 C-REACTIVE PROTEIN: CPT

## 2025-06-01 PROCEDURE — 85027 COMPLETE CBC AUTOMATED: CPT

## 2025-06-01 PROCEDURE — 94640 AIRWAY INHALATION TREATMENT: CPT

## 2025-06-01 PROCEDURE — 36415 COLL VENOUS BLD VENIPUNCTURE: CPT

## 2025-06-01 PROCEDURE — 85652 RBC SED RATE AUTOMATED: CPT

## 2025-06-01 RX ORDER — MORPHINE SULFATE 2 MG/ML
1 INJECTION, SOLUTION INTRAMUSCULAR; INTRAVENOUS ONCE
Status: COMPLETED | OUTPATIENT
Start: 2025-06-01 | End: 2025-06-01

## 2025-06-01 RX ORDER — MORPHINE SULFATE 2 MG/ML
1 INJECTION, SOLUTION INTRAMUSCULAR; INTRAVENOUS ONCE
Status: CANCELLED | OUTPATIENT
Start: 2025-06-01 | End: 2025-06-01

## 2025-06-01 RX ADMIN — KETOROLAC TROMETHAMINE 30 MG: 30 INJECTION, SOLUTION INTRAMUSCULAR; INTRAVENOUS at 06:55

## 2025-06-01 RX ADMIN — VANCOMYCIN HYDROCHLORIDE 1000 MG: 1 INJECTION, SOLUTION INTRAVENOUS at 00:33

## 2025-06-01 RX ADMIN — MONTELUKAST 10 MG: 10 TABLET, FILM COATED ORAL at 09:02

## 2025-06-01 RX ADMIN — METRONIDAZOLE 500 MG: 500 INJECTION, SOLUTION INTRAVENOUS at 16:28

## 2025-06-01 RX ADMIN — FLUTICASONE FUROATE AND VILANTEROL TRIFENATATE 1 PUFF: 100; 25 POWDER RESPIRATORY (INHALATION) at 09:44

## 2025-06-01 RX ADMIN — LIDOCAINE 4% 1 PATCH: 40 PATCH TOPICAL at 09:01

## 2025-06-01 RX ADMIN — CIPROFLOXACIN 400 MG: 400 INJECTION, SOLUTION INTRAVENOUS at 23:36

## 2025-06-01 RX ADMIN — CYCLOBENZAPRINE HYDROCHLORIDE 5 MG: 10 TABLET, FILM COATED ORAL at 16:28

## 2025-06-01 RX ADMIN — KETOROLAC TROMETHAMINE 30 MG: 30 INJECTION, SOLUTION INTRAMUSCULAR; INTRAVENOUS at 12:21

## 2025-06-01 RX ADMIN — ENOXAPARIN SODIUM 40 MG: 100 INJECTION SUBCUTANEOUS at 23:36

## 2025-06-01 RX ADMIN — VANCOMYCIN HYDROCHLORIDE 1000 MG: 1 INJECTION, SOLUTION INTRAVENOUS at 13:35

## 2025-06-01 RX ADMIN — ONDANSETRON 4 MG: 2 INJECTION INTRAMUSCULAR; INTRAVENOUS at 21:53

## 2025-06-01 RX ADMIN — CYCLOBENZAPRINE HYDROCHLORIDE 5 MG: 10 TABLET, FILM COATED ORAL at 09:02

## 2025-06-01 RX ADMIN — ONDANSETRON 4 MG: 2 INJECTION INTRAMUSCULAR; INTRAVENOUS at 06:55

## 2025-06-01 RX ADMIN — CIPROFLOXACIN 400 MG: 400 INJECTION, SOLUTION INTRAVENOUS at 11:36

## 2025-06-01 RX ADMIN — CYCLOBENZAPRINE HYDROCHLORIDE 5 MG: 10 TABLET, FILM COATED ORAL at 21:53

## 2025-06-01 RX ADMIN — MORPHINE SULFATE 1 MG: 2 INJECTION, SOLUTION INTRAMUSCULAR; INTRAVENOUS at 12:21

## 2025-06-01 RX ADMIN — KETOROLAC TROMETHAMINE 30 MG: 30 INJECTION, SOLUTION INTRAMUSCULAR; INTRAVENOUS at 18:02

## 2025-06-01 RX ADMIN — ACETIC ACID 1000 ML: 250 IRRIGANT IRRIGATION at 09:14

## 2025-06-01 RX ADMIN — METRONIDAZOLE 500 MG: 500 INJECTION, SOLUTION INTRAVENOUS at 07:06

## 2025-06-01 ASSESSMENT — COGNITIVE AND FUNCTIONAL STATUS - GENERAL
DAILY ACTIVITIY SCORE: 24
MOBILITY SCORE: 24
DAILY ACTIVITIY SCORE: 24
MOBILITY SCORE: 24

## 2025-06-01 ASSESSMENT — PAIN SCALES - GENERAL
PAINLEVEL_OUTOF10: 3
PAINLEVEL_OUTOF10: 0 - NO PAIN
PAINLEVEL_OUTOF10: 0 - NO PAIN
PAINLEVEL_OUTOF10: 2

## 2025-06-01 ASSESSMENT — PAIN - FUNCTIONAL ASSESSMENT
PAIN_FUNCTIONAL_ASSESSMENT: 0-10

## 2025-06-01 ASSESSMENT — PAIN DESCRIPTION - DESCRIPTORS: DESCRIPTORS: SPASM

## 2025-06-01 NOTE — SIGNIFICANT EVENT
"  Department of Plastic and Reconstructive Surgery  Drain Flush      Patient Name: Alisha Springer  MRN: [id number]  Date:  05/29/25      Subjective  Found resting in bed comfortably on exam. Continues to c/o some mild tenderness to midline abdomen wound and around SUNSHINE drain site. Reports painful debridement today and it was a tough day. Reports some nausea after IV abx but has been tolerating diet. Denies any fever, chills, night sweats, CP, SOB, palpitations, nausea, vomiting, diarrhea, constipation, dysuria, hematuria, hematochezia, hematemesis, flank pain.      Objective   /61 (BP Location: Left arm, Patient Position: Lying)   Pulse 76   Temp 37 °C (98.6 °F) (Temporal)   Resp 18   Ht 1.6 m (5' 3\")   Wt 70.1 kg (154 lb 8.7 oz)   SpO2 97%   BMI 27.38 kg/m²       Physical Exam:  Constitutional: A&Ox3, calm and cooperative, NAD.  Eyes: PERRL, EOMI  ENMT: Moist mucous membranes, no apparent injuries or lesions.  Head/Neck: NC/AT.   Cardiovascular: Normal rate and regular rhythm. 2+ equal pulses of the distal extremities.  Respiratory/Thorax: Regular respirations on RA. Good symmetric chest expansion.   Gastrointestinal: Abdomen soft, tender surrounding midline wound, pink granulation tissue with yellow/green fibrinous exudate present in wound bed, acetic acid WTD dressing in place. Improving surrounding erythema compared to previous exams. Remaining lower abdominal incision is c/d/I with sylke dressing in place. IR drain in place to RLQ with cloudy ss output in drain.    Genitourinary: voiding independently   Extremities: No peripheral edema. Moving all 4 extremities actively.   Neurological: A&Ox3.   Psychological: Appropriate mood and behavior.     Plan/Recommendations  S/P daniele panniculectomy and ventral hernia repair with Dr. Allison 05/12/2025, s/p IR drain placement:  #Wound infection  - attempted bedside irrigation with iodine, did not tolerate, was able to tolerate NS flushes, 3 way stop cock " placed on IR drain to allow for flushing, drain flushed with 30 ml NS after 1 ml of iodine  - 1 mg Morphine IVP once given ongoing burning after irrigation  - ID recommendations to continue current IV abx regimen and to give Zofran 30 minutes prior to Flagyl infusion, orders placed  - ok to leave floor  - will attempt 500 ml NS with 50 ml each time or as much as tolerated once tomorrow  - all other care per daily progress note  - Updated SKYLAR Askew-CNP  Plastic and Reconstructive Surgery   Available via [employer]  Pager #[phone]  Team phone: [phone]

## 2025-06-01 NOTE — CARE PLAN
The patient's goals for the shift include      The clinical goals for the shift include patient to remain hemodynamically stable      Problem: Pain - Adult  Goal: Verbalizes/displays adequate comfort level or baseline comfort level  Outcome: Progressing     Problem: Safety - Adult  Goal: Free from fall injury  Outcome: Progressing     Problem: Discharge Planning  Goal: Discharge to home or other facility with appropriate resources  Outcome: Progressing     Problem: Chronic Conditions and Co-morbidities  Goal: Patient's chronic conditions and co-morbidity symptoms are monitored and maintained or improved  Outcome: Progressing     Problem: Nutrition  Goal: Nutrient intake appropriate for maintaining nutritional needs  Outcome: Progressing     Problem: Pain  Goal: Takes deep breaths with improved pain control throughout the shift  Outcome: Progressing  Goal: Turns in bed with improved pain control throughout the shift  Outcome: Progressing  Goal: Walks with improved pain control throughout the shift  Outcome: Progressing  Goal: Performs ADL's with improved pain control throughout shift  Outcome: Progressing  Goal: Participates in PT with improved pain control throughout the shift  Outcome: Progressing  Goal: Free from opioid side effects throughout the shift  Outcome: Progressing  Goal: Free from acute confusion related to pain meds throughout the shift  Outcome: Progressing     Problem: Fall/Injury  Goal: Not fall by end of shift  Outcome: Progressing  Goal: Be free from injury by end of the shift  Outcome: Progressing  Goal: Verbalize understanding of personal risk factors for fall in the hospital  Outcome: Progressing  Goal: Verbalize understanding of risk factor reduction measures to prevent injury from fall in the home  Outcome: Progressing  Goal: Use assistive devices by end of the shift  Outcome: Progressing  Goal: Pace activities to prevent fatigue by end of the shift  Outcome: Progressing

## 2025-06-01 NOTE — PROGRESS NOTES
"  Department of Plastic and Reconstructive Surgery  Daily Progress Note    Patient Name: Alisha Springer  MRN: [id number]  Date:  06/01/25     Subjective  Found resting in bed comfortably this AM. Reports improved pain and swelling at right lateral abdominal drain site, however still endorsing pain with dressing changes at midline abdominal wound. Tolerating diet, +BM and voiding as normal. Reports mild nausea associated with flagyl infusion, which has improved with scheduling zofran prior to flagyl dose. Denies any fever, chills, night sweats, CP, SOB, headache, extremity numbness/weakness, vision changes, palpitations, nausea, vomiting, or abdominal pain/discomfort.      Overnight Events  None     Objective    Vital Signs  /67   Pulse 74   Temp 36 °C (96.8 °F)   Resp 14   Ht 1.6 m (5' 3\")   Wt 71.5 kg (157 lb 10.1 oz)   SpO2 97%   BMI 27.92 kg/m²      Physical Exam:  Constitutional: A&Ox3, calm and cooperative, NAD.  Eyes: PERRL, EOMI  ENMT: Moist mucous membranes, no apparent injuries or lesions.  Head/Neck: NC/AT.   Cardiovascular: Normal rate and regular rhythm. 2+ equal pulses of the distal extremities.  Respiratory/Thorax: Regular respirations on RA. Good symmetric chest expansion.   Gastrointestinal: Abdomen soft, tender surrounding midline wound, pink granulation tissue with healthy bleeding tissue at wound bed, few areas of yellow fibrinous exudate, acetic acid WTD dressing replaced. Improving surrounding erythema compared to previous exams. Remaining lower abdominal incision is EDWIN, well healing. IR drain in place to RLQ with clearing SS drainage    Genitourinary: voiding independently   Extremities: No peripheral edema. Moving all 4 extremities actively.   Neurological: A&Ox3.   Psychological: Appropriate mood and behavior.      Diagnostics   Results for orders placed or performed during the hospital encounter of 05/24/25 (from the past 24 hours)   Basic metabolic panel   Result Value Ref Range "    Glucose 94 74 - 99 mg/dL    Sodium 139 136 - 145 mmol/L    Potassium 4.3 3.5 - 5.3 mmol/L    Chloride 108 (H) 98 - 107 mmol/L    Bicarbonate 23 21 - 32 mmol/L    Anion Gap 12 10 - 20 mmol/L    Urea Nitrogen 23 6 - 23 mg/dL    Creatinine 0.56 0.50 - 1.05 mg/dL    eGFR >90 >60 mL/min/1.73m*2    Calcium 8.2 (L) 8.6 - 10.6 mg/dL   C-reactive protein   Result Value Ref Range    C-Reactive Protein 1.69 (H) <1.00 mg/dL   CBC   Result Value Ref Range    WBC 7.5 4.4 - 11.3 x10*3/uL    nRBC 0.0 0.0 - 0.0 /100 WBCs    RBC 3.82 (L) 4.00 - 5.20 x10*6/uL    Hemoglobin 11.1 (L) 12.0 - 16.0 g/dL    Hematocrit 39.0 36.0 - 46.0 %     (H) 80 - 100 fL    MCH 29.1 26.0 - 34.0 pg    MCHC 28.5 (L) 32.0 - 36.0 g/dL    RDW 13.3 11.5 - 14.5 %    Platelets 282 150 - 450 x10*3/uL   Sedimentation rate, automated   Result Value Ref Range    Sedimentation Rate 20 0 - 30 mm/h     Imaging  US guided percutaneous peritoneal or retroperitoneal fluid collection drainage  Result Date: 5/30/2025  Uneventful ultrasound guided 8 Kenyan pigtail drainage catheter placement, as detailed above.   I was present for and/or performed the critical portions of the procedure and immediately available throughout the entire procedure.   I personally reviewed the images/study and I agree with the resident Wai Nguyen's findings as stated. This study was interpreted at [facility], [place], [place].   Performed and dictated at [facility].   MACRO: None   Signed by: Carmen Dotson 5/30/2025 6:38 PM Dictation workstation:   [id number]      Cardiology, Vascular, and Other Imaging  No other imaging results found for the past 7 days        Current Medications  Scheduled medications  Scheduled Medications[1]  Continuous medications  Continuous Medications[2]  PRN medications  PRN Medications[3]     Assessment  Alisha Springer is a 59 y.o. female with a past medical history of asthma, GERD s/p  fundoplication, depression, endometriosis, nephrolithiasis, rectocele, female cystocele who is s/p [id number] panniculectomy, ventral hernia repair, sacrospinous ligament suspension for cystocele on 05/12/2025 with Dr. Allison and Dr. Thompson who presented to the ED as a transfer from [facility] for admission under plastic surgery for surgical wound infection of her abdomen. At [facility], CT abdomen pelvis showed soft tissue stranding in the abdominal wall with multiple air bubbles, and large fluid collection along the right lateral aspect of the pelvis. On arrival, patient was hemodynamically stable. She has complaints of extreme nausea without vomiting, significant pain at her abdomen, fever and chills overnight. AN excision debridement at bedside with application of irrigating wound vac was placed. She was admitted under plastic surgery service for IV antibiotics and continued wound care with irrigating wound vac.     Plan/Recommendations  S/P [id number] panniculectomy and ventral hernia repair with Dr. Allison 05/12/2025  #Wound infection  - 5/31 white/yellow fibrinous exudate present in wound bed which was removed via bedside debridement- see procedure note  - Bedside wound cultures obtained 5/25:       ·  4+ abundant morganella morganii, 2+ few pseudomonas aeruginosa, 4+ Abundant polymorphonuclear leukocytes, 4+ abundant mixed gram positive and gram negative bacteria  - ID consulted, appreciate recs       · IV Vancomycin       · IV Flagyl 500 TID            · continue scheduled zofran 30 minutes prior to flagyl infusion        · IV Cipro 400 BID  - Continue to monitor labs, CRP/ESR downtrending  - Incsional dressing removed per attending       ·  Replaced with BID WTD Acetic acid dressing changes              · Plastics to do AM dressing, nursing to perform PM dressing    - Vital signs Q4  - IR confirmed we can flush IR drain.        ·  Patient did not tolerate flushing with betadine yesterday (5/31), will  attempt to flush 500cc NS into IR drain today. Daily irrigation pending tolerance today  - No need for further imaging needed at this time  - Continuing BID Acetic Acid dressing changes to abdominal wound     #Acute pain  - Continue flexeril 5mg TID scheduled for muscle spasms  - Continue Lidocaine patches at drain  site  - DC norco- patient reports not using it  - Zofran 4mg scheduled prior to flagyl dose     #Asthma  - Continue home Breo Ellipta and Singulair  - Stable, consult RT as needed    F: none, encourage PO intake  E: monitor and replete PRN  N: regular diet  GI: Colace BID   Dvt ppx: lovenox 40mg daily, SCD's, encourage ambulation     - Patient/plan discussed with Dr. Schmitz    Disposition: Continue care on RNF. Will remain inpatient with plans for continued IV antibiotic therapy pending ID finalization, pending cultures, and continued drain and wound care.    SKYLAR Wright-CNP  Plastic and Reconstructive Surgery   Available via Epic chat, pager: [phone] or team phones: [phone]          [1] acetic acid, , irrigation, Daily  ciprofloxacin, 400 mg, intravenous, q12h  cyclobenzaprine, 5 mg, oral, TID  enoxaparin, 40 mg, subcutaneous, q24h  fluticasone furoate-vilanteroL, 1 puff, inhalation, Daily  ketorolac, 30 mg, intravenous, q6h DIONI  lidocaine, 1 patch, transdermal, Daily  metroNIDAZOLE, 500 mg, intravenous, q8h  montelukast, 10 mg, oral, q AM  ondansetron, 4 mg, intravenous, q8h  vancomycin, 1,000 mg, intravenous, q12h     [2]    [3] PRN medications: diphenhydrAMINE, vancomycin

## 2025-06-02 LAB
ANION GAP SERPL CALC-SCNC: 12 MMOL/L (ref 10–20)
BUN SERPL-MCNC: 24 MG/DL (ref 6–23)
CALCIUM SERPL-MCNC: 8.2 MG/DL (ref 8.6–10.6)
CHLORIDE SERPL-SCNC: 106 MMOL/L (ref 98–107)
CO2 SERPL-SCNC: 24 MMOL/L (ref 21–32)
CREAT SERPL-MCNC: 0.62 MG/DL (ref 0.5–1.05)
CRP SERPL-MCNC: 1.31 MG/DL
EGFRCR SERPLBLD CKD-EPI 2021: >90 ML/MIN/1.73M*2
ERYTHROCYTE [DISTWIDTH] IN BLOOD BY AUTOMATED COUNT: 13.4 % (ref 11.5–14.5)
ERYTHROCYTE [SEDIMENTATION RATE] IN BLOOD BY WESTERGREN METHOD: 19 MM/H (ref 0–30)
GLUCOSE SERPL-MCNC: 99 MG/DL (ref 74–99)
HCT VFR BLD AUTO: 34.1 % (ref 36–46)
HGB BLD-MCNC: 10.6 G/DL (ref 12–16)
MCH RBC QN AUTO: 29.8 PG (ref 26–34)
MCHC RBC AUTO-ENTMCNC: 31.1 G/DL (ref 32–36)
MCV RBC AUTO: 96 FL (ref 80–100)
NRBC BLD-RTO: 0 /100 WBCS (ref 0–0)
PLATELET # BLD AUTO: 313 X10*3/UL (ref 150–450)
POTASSIUM SERPL-SCNC: 4.7 MMOL/L (ref 3.5–5.3)
RBC # BLD AUTO: 3.56 X10*6/UL (ref 4–5.2)
SODIUM SERPL-SCNC: 137 MMOL/L (ref 136–145)
VANCOMYCIN SERPL-MCNC: 19.7 UG/ML (ref 5–20)
WBC # BLD AUTO: 9.1 X10*3/UL (ref 4.4–11.3)

## 2025-06-02 PROCEDURE — 2500000004 HC RX 250 GENERAL PHARMACY W/ HCPCS (ALT 636 FOR OP/ED)

## 2025-06-02 PROCEDURE — 99232 SBSQ HOSP IP/OBS MODERATE 35: CPT

## 2025-06-02 PROCEDURE — 2500000004 HC RX 250 GENERAL PHARMACY W/ HCPCS (ALT 636 FOR OP/ED): Performed by: PHYSICIAN ASSISTANT

## 2025-06-02 PROCEDURE — 2500000001 HC RX 250 WO HCPCS SELF ADMINISTERED DRUGS (ALT 637 FOR MEDICARE OP)

## 2025-06-02 PROCEDURE — 85652 RBC SED RATE AUTOMATED: CPT

## 2025-06-02 PROCEDURE — 94640 AIRWAY INHALATION TREATMENT: CPT

## 2025-06-02 PROCEDURE — 99232 SBSQ HOSP IP/OBS MODERATE 35: CPT | Performed by: INTERNAL MEDICINE

## 2025-06-02 PROCEDURE — 1170000001 HC PRIVATE ONCOLOGY ROOM DAILY

## 2025-06-02 PROCEDURE — 85027 COMPLETE CBC AUTOMATED: CPT

## 2025-06-02 PROCEDURE — 80202 ASSAY OF VANCOMYCIN: CPT

## 2025-06-02 PROCEDURE — 36415 COLL VENOUS BLD VENIPUNCTURE: CPT

## 2025-06-02 PROCEDURE — 2500000005 HC RX 250 GENERAL PHARMACY W/O HCPCS: Performed by: NURSE PRACTITIONER

## 2025-06-02 PROCEDURE — 2500000004 HC RX 250 GENERAL PHARMACY W/ HCPCS (ALT 636 FOR OP/ED): Mod: JZ | Performed by: NURSE PRACTITIONER

## 2025-06-02 PROCEDURE — 80048 BASIC METABOLIC PNL TOTAL CA: CPT

## 2025-06-02 PROCEDURE — 86140 C-REACTIVE PROTEIN: CPT

## 2025-06-02 RX ORDER — METRONIDAZOLE 500 MG/1
500 TABLET ORAL EVERY 8 HOURS SCHEDULED
Status: DISCONTINUED | OUTPATIENT
Start: 2025-06-02 | End: 2025-06-06 | Stop reason: HOSPADM

## 2025-06-02 RX ORDER — CIPROFLOXACIN 500 MG/1
750 TABLET, FILM COATED ORAL EVERY 12 HOURS SCHEDULED
Status: DISCONTINUED | OUTPATIENT
Start: 2025-06-02 | End: 2025-06-06 | Stop reason: HOSPADM

## 2025-06-02 RX ORDER — MORPHINE SULFATE 2 MG/ML
1 INJECTION, SOLUTION INTRAMUSCULAR; INTRAVENOUS ONCE
Status: COMPLETED | OUTPATIENT
Start: 2025-06-02 | End: 2025-06-02

## 2025-06-02 RX ADMIN — KETOROLAC TROMETHAMINE 30 MG: 30 INJECTION, SOLUTION INTRAMUSCULAR; INTRAVENOUS at 05:38

## 2025-06-02 RX ADMIN — METRONIDAZOLE 500 MG: 500 INJECTION, SOLUTION INTRAVENOUS at 09:53

## 2025-06-02 RX ADMIN — CYCLOBENZAPRINE HYDROCHLORIDE 5 MG: 10 TABLET, FILM COATED ORAL at 21:59

## 2025-06-02 RX ADMIN — ONDANSETRON 4 MG: 2 INJECTION INTRAMUSCULAR; INTRAVENOUS at 14:33

## 2025-06-02 RX ADMIN — CYCLOBENZAPRINE HYDROCHLORIDE 5 MG: 10 TABLET, FILM COATED ORAL at 14:33

## 2025-06-02 RX ADMIN — ENOXAPARIN SODIUM 40 MG: 100 INJECTION SUBCUTANEOUS at 22:08

## 2025-06-02 RX ADMIN — CIPROFOLXACIN 750 MG: 500 TABLET ORAL at 12:12

## 2025-06-02 RX ADMIN — LIDOCAINE 4% 1 PATCH: 40 PATCH TOPICAL at 09:53

## 2025-06-02 RX ADMIN — KETOROLAC TROMETHAMINE 30 MG: 30 INJECTION, SOLUTION INTRAMUSCULAR; INTRAVENOUS at 01:09

## 2025-06-02 RX ADMIN — CIPROFOLXACIN 750 MG: 500 TABLET ORAL at 21:59

## 2025-06-02 RX ADMIN — VANCOMYCIN HYDROCHLORIDE 1000 MG: 1 INJECTION, SOLUTION INTRAVENOUS at 12:12

## 2025-06-02 RX ADMIN — KETOROLAC TROMETHAMINE 30 MG: 30 INJECTION, SOLUTION INTRAMUSCULAR; INTRAVENOUS at 18:06

## 2025-06-02 RX ADMIN — KETOROLAC TROMETHAMINE 30 MG: 30 INJECTION, SOLUTION INTRAMUSCULAR; INTRAVENOUS at 12:12

## 2025-06-02 RX ADMIN — VANCOMYCIN HYDROCHLORIDE 1000 MG: 1 INJECTION, SOLUTION INTRAVENOUS at 02:22

## 2025-06-02 RX ADMIN — ONDANSETRON 4 MG: 2 INJECTION INTRAMUSCULAR; INTRAVENOUS at 22:01

## 2025-06-02 RX ADMIN — MONTELUKAST 10 MG: 10 TABLET, FILM COATED ORAL at 09:53

## 2025-06-02 RX ADMIN — FLUTICASONE FUROATE AND VILANTEROL TRIFENATATE 1 PUFF: 100; 25 POWDER RESPIRATORY (INHALATION) at 09:26

## 2025-06-02 RX ADMIN — ONDANSETRON 4 MG: 2 INJECTION INTRAMUSCULAR; INTRAVENOUS at 05:38

## 2025-06-02 RX ADMIN — METRONIDAZOLE 500 MG: 500 INJECTION, SOLUTION INTRAVENOUS at 01:09

## 2025-06-02 RX ADMIN — CYCLOBENZAPRINE HYDROCHLORIDE 5 MG: 10 TABLET, FILM COATED ORAL at 09:53

## 2025-06-02 RX ADMIN — MORPHINE SULFATE 1 MG: 2 INJECTION, SOLUTION INTRAMUSCULAR; INTRAVENOUS at 12:12

## 2025-06-02 RX ADMIN — METRONIDAZOLE 500 MG: 500 TABLET ORAL at 22:01

## 2025-06-02 ASSESSMENT — COGNITIVE AND FUNCTIONAL STATUS - GENERAL
MOBILITY SCORE: 23
DAILY ACTIVITIY SCORE: 24
MOBILITY SCORE: 24
CLIMB 3 TO 5 STEPS WITH RAILING: A LITTLE
DAILY ACTIVITIY SCORE: 24

## 2025-06-02 ASSESSMENT — PAIN SCALES - GENERAL
PAINLEVEL_OUTOF10: 0 - NO PAIN
PAINLEVEL_OUTOF10: 2
PAINLEVEL_OUTOF10: 4
PAINLEVEL_OUTOF10: 3

## 2025-06-02 ASSESSMENT — PAIN - FUNCTIONAL ASSESSMENT
PAIN_FUNCTIONAL_ASSESSMENT: 0-10

## 2025-06-02 NOTE — CARE PLAN
The patient's goals for the shift include      The clinical goals for the shift include pt will remain HDS    Problem: Pain - Adult  Goal: Verbalizes/displays adequate comfort level or baseline comfort level  Outcome: Progressing     Problem: Safety - Adult  Goal: Free from fall injury  Outcome: Progressing     Problem: Discharge Planning  Goal: Discharge to home or other facility with appropriate resources  Outcome: Progressing     Problem: Chronic Conditions and Co-morbidities  Goal: Patient's chronic conditions and co-morbidity symptoms are monitored and maintained or improved  Outcome: Progressing     Problem: Nutrition  Goal: Nutrient intake appropriate for maintaining nutritional needs  Outcome: Progressing     Problem: Pain  Goal: Takes deep breaths with improved pain control throughout the shift  Outcome: Progressing  Goal: Turns in bed with improved pain control throughout the shift  Outcome: Progressing  Goal: Walks with improved pain control throughout the shift  Outcome: Progressing  Goal: Performs ADL's with improved pain control throughout shift  Outcome: Progressing  Goal: Participates in PT with improved pain control throughout the shift  Outcome: Progressing  Goal: Free from opioid side effects throughout the shift  Outcome: Progressing  Goal: Free from acute confusion related to pain meds throughout the shift  Outcome: Progressing     Problem: Fall/Injury  Goal: Not fall by end of shift  Outcome: Progressing  Goal: Be free from injury by end of the shift  Outcome: Progressing  Goal: Verbalize understanding of personal risk factors for fall in the hospital  Outcome: Progressing  Goal: Verbalize understanding of risk factor reduction measures to prevent injury from fall in the home  Outcome: Progressing  Goal: Use assistive devices by end of the shift  Outcome: Progressing  Goal: Pace activities to prevent fatigue by end of the shift  Outcome: Progressing

## 2025-06-02 NOTE — CARE PLAN
Problem: Pain - Adult  Goal: Verbalizes/displays adequate comfort level or baseline comfort level  Outcome: Progressing     Problem: Safety - Adult  Goal: Free from fall injury  Outcome: Progressing     Problem: Discharge Planning  Goal: Discharge to home or other facility with appropriate resources  Outcome: Progressing     Problem: Chronic Conditions and Co-morbidities  Goal: Patient's chronic conditions and co-morbidity symptoms are monitored and maintained or improved  Outcome: Progressing     Problem: Nutrition  Goal: Nutrient intake appropriate for maintaining nutritional needs  Outcome: Progressing   The patient's goals for the shift include      The clinical goals for the shift include Pt to rate pain <6, tolerate debridement/flushing of wound and remain HDS

## 2025-06-02 NOTE — PROGRESS NOTES
Vancomycin Dosing by Pharmacy- FOLLOW UP    Alisha Springer is a 59 y.o. year old female who Pharmacy has been consulted for vancomycin dosing for surgical wound infection. Based on the patient's indication and renal status this patient is being dosed based on a goal AUC of 400-600.     Renal function is currently stable.    Current vancomycin dose: 1000 mg given every 12 hours    Estimated vancomycin AUC on current dose: 458 mg/L.hr     Visit Vitals  BP 99/67 (BP Location: Right arm, Patient Position: Lying)   Pulse 67   Temp 36.7 °C (98.1 °F) (Temporal)   Resp 16        Lab Results   Component Value Date    CREATININE 0.62 2025    CREATININE 0.56 2025    CREATININE 0.52 2025    CREATININE 0.60 2025        Patient weight is as follows:   Vitals:    25 0451   Weight: 78.3 kg (172 lb 9.9 oz)       Cultures:  Susceptibility data for the encounter in last 14 days.  Collected Specimen Info Organism Amoxicillin/Clavulanate Ampicillin Ampicillin/Sulbactam Aztreonam Cefazolin Cefepime Ceftazidime Ceftriaxone Ciprofloxacin Ertapenem Gentamicin Imipenem Levofloxacin Meropenem   25 Tissue/Biopsy from Wound/Tissue Pseudomonas aeruginosa     S   S  S   S     S      Morganella morganii  R  R  R   R    S  S  S  I  I  S  S     Mixed Anaerobic Bacteria                   Collected Specimen Info Organism Piperacillin/Tazobactam Tobramycin Trimethoprim/Sulfamethoxazole   25 Tissue/Biopsy from Wound/Tissue Pseudomonas aeruginosa  S  S      Morganella morganii  S  S  S     Mixed Anaerobic Bacteria           I/O last 3 completed shifts:  In: 1180 (15.1 mL/kg) [P.O.:1180]  Out: 42 (0.5 mL/kg) [Drains:42]  Weight: 78.3 kg   I/O during current shift:  No intake/output data recorded.    Temp (24hrs), Av.8 °C (98.2 °F), Min:36.4 °C (97.5 °F), Max:37.1 °C (98.8 °F)      Assessment/Plan    Within goal AUC range. Continue current vancomycin regimen.    This dosing regimen is predicted by [employer] to  result in the following pharmacokinetic parameters:    Regimen: 1000 mg IV every 12 hours.  Start time: 14:22 on 06/02/2025  Exposure target: AUC24 (range) 400-600 mg/L.hr   RNP92-64: 452 mg/L.hr  AUC24,ss: 458 mg/L.hr  Probability of AUC24 > 400: 92 %  Ctrough,ss: 13.7 mg/L  Probability of Ctrough,ss > 20: 1 %    The next level will be obtained on 6/5 at 0500. May be obtained sooner if clinically indicated.   Will continue to monitor renal function daily while on vancomycin and order serum creatinine at least every 48 hours if not already ordered.  Follow for continued vancomycin needs, clinical response, and signs/symptoms of toxicity.       Evangelist Rene, PharmD

## 2025-06-02 NOTE — PROGRESS NOTES
"  Department of Plastic and Reconstructive Surgery  Daily Progress Note    Patient Name: Alisha Springer  MRN: [id number]  Date:  06/02/25     Subjective  Found resting in bed comfortably this AM. Pain is improving but is noticeable when she moves around a lot. Ambulating well despite pain.  States she is experiencing some nausea but it is controlled with Zofran. Eating and drinking well. Denies any fever, chills, night sweats, CP, SOB, headache, extremity numbness/weakness, vision changes, palpitations, vomiting, or abdominal pain/discomfort.      Overnight Events  None     Objective    Vital Signs  /70 (BP Location: Left arm, Patient Position: Lying)   Pulse 76   Temp 36 °C (96.8 °F) (Temporal)   Resp 16   Ht 1.6 m (5' 3\")   Wt 78.3 kg (172 lb 9.9 oz)   SpO2 99%   BMI 30.58 kg/m²      Physical Exam:  Constitutional: A&Ox3, calm and cooperative, NAD.  Eyes: PERRL, EOMI  ENMT: Moist mucous membranes, no apparent injuries or lesions.  Head/Neck: NC/AT.   Cardiovascular: Normal rate and regular rhythm. 2+ equal pulses of the distal extremities.  Respiratory/Thorax: Regular respirations on RA. Good symmetric chest expansion.   Gastrointestinal: Abdomen soft, tender surrounding midline wound, pink granulation tissue with healthy bleeding tissue at wound bed, few areas of yellow fibrinous exudate, acetic acid WTD dressing replaced. Improving surrounding erythema compared to previous exams. Remaining lower abdominal incision is EDWIN, well healing. IR drain in place to RLQ with clearing SS drainage    Genitourinary: voiding independently   Extremities: No peripheral edema. Moving all 4 extremities actively.   Neurological: A&Ox3.   Psychological: Appropriate mood and behavior.      Diagnostics   Results for orders placed or performed during the hospital encounter of 05/24/25 (from the past 24 hours)   Basic metabolic panel   Result Value Ref Range    Glucose 99 74 - 99 mg/dL    Sodium 137 136 - 145 mmol/L    " Potassium 4.7 3.5 - 5.3 mmol/L    Chloride 106 98 - 107 mmol/L    Bicarbonate 24 21 - 32 mmol/L    Anion Gap 12 10 - 20 mmol/L    Urea Nitrogen 24 (H) 6 - 23 mg/dL    Creatinine 0.62 0.50 - 1.05 mg/dL    eGFR >90 >60 mL/min/1.73m*2    Calcium 8.2 (L) 8.6 - 10.6 mg/dL   C-reactive protein   Result Value Ref Range    C-Reactive Protein 1.31 (H) <1.00 mg/dL   CBC   Result Value Ref Range    WBC 9.1 4.4 - 11.3 x10*3/uL    nRBC 0.0 0.0 - 0.0 /100 WBCs    RBC 3.56 (L) 4.00 - 5.20 x10*6/uL    Hemoglobin 10.6 (L) 12.0 - 16.0 g/dL    Hematocrit 34.1 (L) 36.0 - 46.0 %    MCV 96 80 - 100 fL    MCH 29.8 26.0 - 34.0 pg    MCHC 31.1 (L) 32.0 - 36.0 g/dL    RDW 13.4 11.5 - 14.5 %    Platelets 313 150 - 450 x10*3/uL   Sedimentation rate, automated   Result Value Ref Range    Sedimentation Rate 19 0 - 30 mm/h   Vancomycin   Result Value Ref Range    Vancomycin 19.7 5.0 - 20.0 ug/mL     Imaging  US guided percutaneous peritoneal or retroperitoneal fluid collection drainage  Result Date: 5/30/2025  Uneventful ultrasound guided 8 Croatian pigtail drainage catheter placement, as detailed above.   I was present for and/or performed the critical portions of the procedure and immediately available throughout the entire procedure.   I personally reviewed the images/study and I agree with the resident Wai Nguyen's findings as stated. This study was interpreted at [facility], [place], [place].   Performed and dictated at [facility].   MACRO: None   Signed by: Carmen Dotson 5/30/2025 6:38 PM Dictation workstation:   [id number]      Cardiology, Vascular, and Other Imaging  No other imaging results found for the past 7 days        Current Medications  Scheduled medications  Scheduled Medications[1]  Continuous medications  Continuous Medications[2]  PRN medications  PRN Medications[3]     Assessment  Alisha Springer is a 59 y.o. female with a past medical history of  asthma, GERD s/p fundoplication, depression, endometriosis, nephrolithiasis, rectocele, female cystocele who is s/p [id number] panniculectomy, ventral hernia repair, sacrospinous ligament suspension for cystocele on 05/12/2025 with Dr. Allison and Dr. Thompson who presented to the ED as a transfer from [facility] for admission under plastic surgery for surgical wound infection of her abdomen. At [facility], CT abdomen pelvis showed soft tissue stranding in the abdominal wall with multiple air bubbles, and large fluid collection along the right lateral aspect of the pelvis. On arrival, patient was hemodynamically stable. She has complaints of extreme nausea without vomiting, significant pain at her abdomen, fever and chills overnight. AN excision debridement at bedside with application of irrigating wound vac was placed. She was admitted under plastic surgery service for IV antibiotics and continued wound care with irrigating wound vac.     Plan/Recommendations  S/P [id number] panniculectomy and ventral hernia repair with Dr. Allison 05/12/2025  #Wound infection  - 5/31 white/yellow fibrinous exudate present in wound bed which was removed via bedside debridement- see procedure note  - Bedside wound cultures obtained 5/25:       ·  4+ abundant morganella morganii, 2+ few pseudomonas aeruginosa, 4+ Abundant polymorphonuclear leukocytes, 4+ abundant mixed gram positive and gram negative bacteria  -Fluid from aspiration culture on 5/30 prelim results show 1 colony Morganella morganii (6/2)  - ID consulted, appreciate recs       · Continue IV Vanc       · Transition IV flagyl 500mg to PO flagyl 500mg q8            · continue scheduled zofran 30 minutes prior to flagyl dose       · Transition IV Cipro to PO Cipro 750mg BID.  - Continue to monitor labs, CRP/ESR downtrending  - Incsional dressing removed per attending       ·  Add adaptic to wound bed and continue with BID kerlex WTD dressings but switch from acetic acid to  voshe AM and saline PM             · Plastics to do AM dressing, nursing to perform PM dressing    - Vital signs Q4  - IR confirmed we can flush IR drain.        ·  Medicate with morphine before flushing drain for pain.   Use 500cc of voshe to flush per ID recs.   - No need for further imaging at this time, will need US or CT scan once drain is removed.       #Acute pain  - Continue flexeril 5mg TID scheduled for muscle spasms  - Continue Lidocaine patches at drain  site  - DC norco- patient reports not using it  - Zofran 4mg scheduled prior to flagyl dose     #Asthma  - Continue home Breo Ellipta and Singulair  - Stable, consult RT as needed    F: none, encourage PO intake  E: monitor and replete PRN  N: regular diet  GI: Colace BID   Dvt ppx: lovenox 40mg daily, SCD's, encourage ambulation     - Patient/plan discussed with Dr. Schmitz    Disposition: Continue care on RNF. Will remain inpatient with plans for continued IV antibiotic therapy pending ID finalization, pending cultures, and continued drain and wound care.    Linda Davis PA-C  Plastic and Reconstructive Surgery   Available via Epic chat, pager: [phone] or team phones: [phone]          [1] acetic acid, , irrigation, Daily  ciprofloxacin, 400 mg, intravenous, q12h  cyclobenzaprine, 5 mg, oral, TID  enoxaparin, 40 mg, subcutaneous, q24h  fluticasone furoate-vilanteroL, 1 puff, inhalation, Daily  ketorolac, 30 mg, intravenous, q6h DIONI  lidocaine, 1 patch, transdermal, Daily  metroNIDAZOLE, 500 mg, intravenous, q8h  montelukast, 10 mg, oral, q AM  ondansetron, 4 mg, intravenous, q8h  vancomycin, 1,000 mg, intravenous, q12h     [2]    [3] PRN medications: diphenhydrAMINE, vancomycin

## 2025-06-03 LAB
ANION GAP SERPL CALC-SCNC: 11 MMOL/L (ref 10–20)
BACTERIA FLD CULT: ABNORMAL
BUN SERPL-MCNC: 18 MG/DL (ref 6–23)
CALCIUM SERPL-MCNC: 8.4 MG/DL (ref 8.6–10.6)
CHLORIDE SERPL-SCNC: 108 MMOL/L (ref 98–107)
CO2 SERPL-SCNC: 26 MMOL/L (ref 21–32)
CREAT SERPL-MCNC: 0.58 MG/DL (ref 0.5–1.05)
CRP SERPL-MCNC: 1.25 MG/DL
EGFRCR SERPLBLD CKD-EPI 2021: >90 ML/MIN/1.73M*2
ERYTHROCYTE [DISTWIDTH] IN BLOOD BY AUTOMATED COUNT: 13.5 % (ref 11.5–14.5)
ERYTHROCYTE [SEDIMENTATION RATE] IN BLOOD BY WESTERGREN METHOD: 21 MM/H (ref 0–30)
GLUCOSE SERPL-MCNC: 94 MG/DL (ref 74–99)
GRAM STN SPEC: ABNORMAL
GRAM STN SPEC: ABNORMAL
HCT VFR BLD AUTO: 36.4 % (ref 36–46)
HGB BLD-MCNC: 11.3 G/DL (ref 12–16)
MCH RBC QN AUTO: 29.4 PG (ref 26–34)
MCHC RBC AUTO-ENTMCNC: 31 G/DL (ref 32–36)
MCV RBC AUTO: 95 FL (ref 80–100)
NRBC BLD-RTO: 0 /100 WBCS (ref 0–0)
PLATELET # BLD AUTO: 352 X10*3/UL (ref 150–450)
POTASSIUM SERPL-SCNC: 4.3 MMOL/L (ref 3.5–5.3)
RBC # BLD AUTO: 3.85 X10*6/UL (ref 4–5.2)
SODIUM SERPL-SCNC: 141 MMOL/L (ref 136–145)
WBC # BLD AUTO: 8.6 X10*3/UL (ref 4.4–11.3)

## 2025-06-03 PROCEDURE — 2500000001 HC RX 250 WO HCPCS SELF ADMINISTERED DRUGS (ALT 637 FOR MEDICARE OP)

## 2025-06-03 PROCEDURE — 2500000005 HC RX 250 GENERAL PHARMACY W/O HCPCS: Performed by: NURSE PRACTITIONER

## 2025-06-03 PROCEDURE — 2500000004 HC RX 250 GENERAL PHARMACY W/ HCPCS (ALT 636 FOR OP/ED)

## 2025-06-03 PROCEDURE — 1170000001 HC PRIVATE ONCOLOGY ROOM DAILY

## 2025-06-03 PROCEDURE — 36415 COLL VENOUS BLD VENIPUNCTURE: CPT

## 2025-06-03 PROCEDURE — 97530 THERAPEUTIC ACTIVITIES: CPT | Performed by: PHYSICIAN ASSISTANT

## 2025-06-03 PROCEDURE — 86140 C-REACTIVE PROTEIN: CPT

## 2025-06-03 PROCEDURE — 80048 BASIC METABOLIC PNL TOTAL CA: CPT

## 2025-06-03 PROCEDURE — 2500000004 HC RX 250 GENERAL PHARMACY W/ HCPCS (ALT 636 FOR OP/ED): Performed by: PHYSICIAN ASSISTANT

## 2025-06-03 PROCEDURE — 2500000004 HC RX 250 GENERAL PHARMACY W/ HCPCS (ALT 636 FOR OP/ED): Performed by: NURSE PRACTITIONER

## 2025-06-03 PROCEDURE — 94640 AIRWAY INHALATION TREATMENT: CPT

## 2025-06-03 PROCEDURE — 85027 COMPLETE CBC AUTOMATED: CPT

## 2025-06-03 PROCEDURE — 99231 SBSQ HOSP IP/OBS SF/LOW 25: CPT | Performed by: PHYSICIAN ASSISTANT

## 2025-06-03 PROCEDURE — 85652 RBC SED RATE AUTOMATED: CPT

## 2025-06-03 RX ORDER — MORPHINE SULFATE 2 MG/ML
2 INJECTION, SOLUTION INTRAMUSCULAR; INTRAVENOUS ONCE
Status: COMPLETED | OUTPATIENT
Start: 2025-06-03 | End: 2025-06-03

## 2025-06-03 RX ORDER — MORPHINE SULFATE 2 MG/ML
1 INJECTION, SOLUTION INTRAMUSCULAR; INTRAVENOUS ONCE
Status: COMPLETED | OUTPATIENT
Start: 2025-06-03 | End: 2025-06-03

## 2025-06-03 RX ADMIN — METRONIDAZOLE 500 MG: 500 TABLET ORAL at 06:13

## 2025-06-03 RX ADMIN — CYCLOBENZAPRINE HYDROCHLORIDE 5 MG: 10 TABLET, FILM COATED ORAL at 14:04

## 2025-06-03 RX ADMIN — KETOROLAC TROMETHAMINE 30 MG: 30 INJECTION, SOLUTION INTRAMUSCULAR; INTRAVENOUS at 06:13

## 2025-06-03 RX ADMIN — ENOXAPARIN SODIUM 40 MG: 100 INJECTION SUBCUTANEOUS at 22:59

## 2025-06-03 RX ADMIN — FLUTICASONE FUROATE AND VILANTEROL TRIFENATATE 1 PUFF: 100; 25 POWDER RESPIRATORY (INHALATION) at 09:28

## 2025-06-03 RX ADMIN — METRONIDAZOLE 500 MG: 500 TABLET ORAL at 21:36

## 2025-06-03 RX ADMIN — MONTELUKAST 10 MG: 10 TABLET, FILM COATED ORAL at 08:02

## 2025-06-03 RX ADMIN — ONDANSETRON 4 MG: 2 INJECTION INTRAMUSCULAR; INTRAVENOUS at 14:04

## 2025-06-03 RX ADMIN — ONDANSETRON 4 MG: 2 INJECTION INTRAMUSCULAR; INTRAVENOUS at 21:36

## 2025-06-03 RX ADMIN — MORPHINE SULFATE 1 MG: 2 INJECTION, SOLUTION INTRAMUSCULAR; INTRAVENOUS at 08:02

## 2025-06-03 RX ADMIN — CYCLOBENZAPRINE HYDROCHLORIDE 5 MG: 10 TABLET, FILM COATED ORAL at 08:02

## 2025-06-03 RX ADMIN — MORPHINE SULFATE 1 MG: 2 INJECTION, SOLUTION INTRAMUSCULAR; INTRAVENOUS at 08:40

## 2025-06-03 RX ADMIN — MORPHINE SULFATE 2 MG: 2 INJECTION, SOLUTION INTRAMUSCULAR; INTRAVENOUS at 11:43

## 2025-06-03 RX ADMIN — KETOROLAC TROMETHAMINE 30 MG: 30 INJECTION, SOLUTION INTRAMUSCULAR; INTRAVENOUS at 11:43

## 2025-06-03 RX ADMIN — KETOROLAC TROMETHAMINE 30 MG: 30 INJECTION, SOLUTION INTRAMUSCULAR; INTRAVENOUS at 22:58

## 2025-06-03 RX ADMIN — VANCOMYCIN HYDROCHLORIDE 1000 MG: 1 INJECTION, SOLUTION INTRAVENOUS at 00:55

## 2025-06-03 RX ADMIN — CYCLOBENZAPRINE HYDROCHLORIDE 5 MG: 10 TABLET, FILM COATED ORAL at 21:36

## 2025-06-03 RX ADMIN — LIDOCAINE 4% 1 PATCH: 40 PATCH TOPICAL at 08:02

## 2025-06-03 RX ADMIN — KETOROLAC TROMETHAMINE 30 MG: 30 INJECTION, SOLUTION INTRAMUSCULAR; INTRAVENOUS at 17:19

## 2025-06-03 RX ADMIN — ONDANSETRON 4 MG: 2 INJECTION INTRAMUSCULAR; INTRAVENOUS at 06:13

## 2025-06-03 RX ADMIN — CIPROFOLXACIN 750 MG: 500 TABLET ORAL at 21:35

## 2025-06-03 RX ADMIN — CIPROFOLXACIN 750 MG: 500 TABLET ORAL at 08:02

## 2025-06-03 RX ADMIN — KETOROLAC TROMETHAMINE 30 MG: 30 INJECTION, SOLUTION INTRAMUSCULAR; INTRAVENOUS at 00:55

## 2025-06-03 RX ADMIN — METRONIDAZOLE 500 MG: 500 TABLET ORAL at 14:04

## 2025-06-03 ASSESSMENT — COGNITIVE AND FUNCTIONAL STATUS - GENERAL
DAILY ACTIVITIY SCORE: 24
DAILY ACTIVITIY SCORE: 24
MOBILITY SCORE: 23
CLIMB 3 TO 5 STEPS WITH RAILING: A LITTLE
MOBILITY SCORE: 23
CLIMB 3 TO 5 STEPS WITH RAILING: A LITTLE

## 2025-06-03 ASSESSMENT — PAIN - FUNCTIONAL ASSESSMENT
PAIN_FUNCTIONAL_ASSESSMENT: 0-10

## 2025-06-03 ASSESSMENT — PAIN SCALES - GENERAL
PAINLEVEL_OUTOF10: 9
PAINLEVEL_OUTOF10: 3
PAINLEVEL_OUTOF10: 5 - MODERATE PAIN

## 2025-06-03 NOTE — CARE PLAN
The patient's goals for the shift include      The clinical goals for the shift include pt will remain HDS throughout shift      Problem: Pain - Adult  Goal: Verbalizes/displays adequate comfort level or baseline comfort level  Outcome: Progressing     Problem: Safety - Adult  Goal: Free from fall injury  Outcome: Progressing     Problem: Discharge Planning  Goal: Discharge to home or other facility with appropriate resources  Outcome: Progressing     Problem: Chronic Conditions and Co-morbidities  Goal: Patient's chronic conditions and co-morbidity symptoms are monitored and maintained or improved  Outcome: Progressing     Problem: Nutrition  Goal: Nutrient intake appropriate for maintaining nutritional needs  Outcome: Progressing     Problem: Pain  Goal: Takes deep breaths with improved pain control throughout the shift  Outcome: Progressing  Goal: Turns in bed with improved pain control throughout the shift  Outcome: Progressing  Goal: Walks with improved pain control throughout the shift  Outcome: Progressing  Goal: Performs ADL's with improved pain control throughout shift  Outcome: Progressing  Goal: Participates in PT with improved pain control throughout the shift  Outcome: Progressing  Goal: Free from opioid side effects throughout the shift  Outcome: Progressing  Goal: Free from acute confusion related to pain meds throughout the shift  Outcome: Progressing     Problem: Fall/Injury  Goal: Not fall by end of shift  Outcome: Progressing  Goal: Be free from injury by end of the shift  Outcome: Progressing  Goal: Verbalize understanding of personal risk factors for fall in the hospital  Outcome: Progressing  Goal: Verbalize understanding of risk factor reduction measures to prevent injury from fall in the home  Outcome: Progressing  Goal: Use assistive devices by end of the shift  Outcome: Progressing  Goal: Pace activities to prevent fatigue by end of the shift  Outcome: Progressing

## 2025-06-03 NOTE — PROGRESS NOTES
06/03/25 0900   Discharge Planning   Living Arrangements Children   Support Systems Family members;Children   Type of Residence Private residence   Who is requesting discharge planning? Provider   Home or Post Acute Services In home services   Type of Home Care Services Home nursing visits   Expected Discharge Disposition Home H     Care Transitions Note    Plan per Medical/Surgical Team: Not tolerating Vosche into abd cavity, pain management.  Status: Inpatient  Payor Source: [facility]  Discharge disposition: [facility] SN, wound vac, IV antibiotics  Expected date of discharge: TBD  Barriers:  None  PCP / Primary Oncologist: Jacque Fuchs MD  Preferred Pharmacy: [facility], [address] [place]   Preferred home care agency: [facility]  Transportation at discharge: Sister/sarah Pollock RN  Transitional Care Coordinator (TCC)  [phone]

## 2025-06-03 NOTE — PROGRESS NOTES
Vancomycin Dosing by Pharmacy- Cessation of Therapy    Consult to pharmacy for vancomycin dosing has been discontinued by the prescriber, pharmacy will sign off at this time.    Please call pharmacy if there are further questions or re-enter a consult if vancomycin is resumed.     JANESSA OAKES

## 2025-06-03 NOTE — PROGRESS NOTES
"  Department of Plastic and Reconstructive Surgery  Daily Progress Note    Patient Name: Alisha Springer  MRN: [id number]  Date:  06/03/25     Subjective  Found resting in bed comfortably this AM. She reports most of her pain \" as burning\" that comes with irrigating pelvic wound. She finds that with premedication and ambulation this improves her discomfort significantly. She is eating, drinking, voiding spontaneously and + BM. Denies any fever, chills, night sweats, CP, SOB, headache.     Of note, this AM was the first trial with Vashe irrigation through IR placed SUNSHINE drain. She was premedicated with 1 gram of morphine prior to bedside procedure.  At this time, she noted 10/10 burning pain with slow irrigation of 40 ml of Vashe. After a 30 minute rest, she continued to show increased discomfort and unable to move forward with remaining Vashe volume. Remaining 460 ml of NS was used to irrigate abdominal/pelvic cavity per Dr Schmitz.     Overnight Events  None     Objective    Vital Signs  /77 (BP Location: Right arm, Patient Position: Lying)   Pulse 82   Temp 36.6 °C (97.9 °F) (Temporal)   Resp 18   Ht 1.6 m (5' 3\")   Wt 79.1 kg (174 lb 6.1 oz)   SpO2 98%   BMI 30.89 kg/m²      Physical Exam:  Constitutional: A&Ox3, calm and cooperative, NAD.  Eyes: PERRL, EOMI  ENMT: Moist mucous membranes, no apparent injuries or lesions.  Head/Neck: NC/AT.   Cardiovascular: Normal rate and regular rhythm. 2+ equal pulses of the distal extremities.  Respiratory/Thorax: Regular respirations on RA. Good symmetric chest expansion.   Gastrointestinal: Abdomen soft, tender surrounding midline wound, pink granulation tissue with healthy bleeding tissue at wound bed, few areas of yellow fibrinous exudate, acetic acid WTD dressing replaced. Improving surrounding erythema compared to previous exams. Remaining lower abdominal incision is EDWIN, well healing. IR drain in place to RLQ with clearing SS drainage    Genitourinary: voiding " independently   Extremities: No peripheral edema. Moving all 4 extremities actively.   Neurological: A&Ox3.   Psychological: Appropriate mood and behavior.      Diagnostics   Results for orders placed or performed during the hospital encounter of 05/24/25 (from the past 24 hours)   Basic metabolic panel   Result Value Ref Range    Glucose 94 74 - 99 mg/dL    Sodium 141 136 - 145 mmol/L    Potassium 4.3 3.5 - 5.3 mmol/L    Chloride 108 (H) 98 - 107 mmol/L    Bicarbonate 26 21 - 32 mmol/L    Anion Gap 11 10 - 20 mmol/L    Urea Nitrogen 18 6 - 23 mg/dL    Creatinine 0.58 0.50 - 1.05 mg/dL    eGFR >90 >60 mL/min/1.73m*2    Calcium 8.4 (L) 8.6 - 10.6 mg/dL   C-reactive protein   Result Value Ref Range    C-Reactive Protein 1.25 (H) <1.00 mg/dL   CBC   Result Value Ref Range    WBC 8.6 4.4 - 11.3 x10*3/uL    nRBC 0.0 0.0 - 0.0 /100 WBCs    RBC 3.85 (L) 4.00 - 5.20 x10*6/uL    Hemoglobin 11.3 (L) 12.0 - 16.0 g/dL    Hematocrit 36.4 36.0 - 46.0 %    MCV 95 80 - 100 fL    MCH 29.4 26.0 - 34.0 pg    MCHC 31.0 (L) 32.0 - 36.0 g/dL    RDW 13.5 11.5 - 14.5 %    Platelets 352 150 - 450 x10*3/uL   Sedimentation rate, automated   Result Value Ref Range    Sedimentation Rate 21 0 - 30 mm/h     Imaging  US guided percutaneous peritoneal or retroperitoneal fluid collection drainage  Result Date: 5/30/2025  Uneventful ultrasound guided 8 English pigtail drainage catheter placement, as detailed above.   I was present for and/or performed the critical portions of the procedure and immediately available throughout the entire procedure.   I personally reviewed the images/study and I agree with the resident Wai Nguyen's findings as stated. This study was interpreted at [place], [place].   Performed and dictated at [facility].   MACRO: None   Signed by: Carmen Dotson 5/30/2025 6:38 PM Dictation workstation:   [id number]      Cardiology, Vascular, and  Other Imaging  No other imaging results found for the past 7 days        Current Medications  Scheduled medications  Scheduled Medications[1]  Continuous medications  Continuous Medications[2]  PRN medications  PRN Medications[3]     Assessment  Alisha Springer is a 59 y.o. female with a past medical history of asthma, GERD s/p fundoplication, depression, endometriosis, nephrolithiasis, rectocele, female cystocele who is s/p [id number] panniculectomy, ventral hernia repair, sacrospinous ligament suspension for cystocele on 05/12/2025 with Dr. Allison and Dr. Thompson who presented to the ED as a transfer from [facility] for admission under plastic surgery for surgical wound infection of her abdomen. At [facility], CT abdomen pelvis showed soft tissue stranding in the abdominal wall with multiple air bubbles, and large fluid collection along the right lateral aspect of the pelvis. On arrival, patient was hemodynamically stable. She has complaints of extreme nausea without vomiting, significant pain at her abdomen, fever and chills overnight. AN excision debridement at bedside with application of irrigating wound vac was placed. She was admitted under plastic surgery service for IV antibiotics and continued wound care with irrigating wound vac.     Plan/Recommendations  S/P [id number] panniculectomy and ventral hernia repair with Dr. Allison 05/12/2025  #Wound infection  - 5/31 white/yellow fibrinous exudate present in wound bed which was removed via bedside debridement- see procedure note  - Bedside wound cultures obtained 5/25:       ·  4+ abundant morganella morganii, 2+ few pseudomonas aeruginosa, 4+ Abundant polymorphonuclear leukocytes, 4+ abundant mixed gram positive and gram negative bacteria  -Fluid from aspiration culture on 5/30 prelim results show 1 colony Morganella morganii (6/2)  - ID consulted, appreciate recs       · ID recommends DC Vancomycin (6/2)       · Transition IV flagyl 500mg to PO flagyl 500mg  Q8            · continue scheduled zofran 30 minutes prior to flagyl dose       · Transition IV Cipro to PO Cipro 750mg BID.  - Continue to monitor labs, CRP/ESR downtrending   - 6/3: CRP 1.25  - Incsional dressing removed per attending       ·  Add adaptic to wound bed and continue with BID kerlex WTD dressings but switch from acetic acid to Vashe AM and saline PM             · Plastics to do AM dressing, nursing to perform PM dressing    - Vital signs Q4  - IR confirmed we can flush IR drain.        ·  Medicate with morphine before flushing drain for burning pain.   Unable to tolerate large volume of Vashe irrigation (6/3) AM  Plan to premedicate with 2g of Morphine with 20-30 ml of Vashe in AM performed by Plastics. At this time, and when tolerated, will  transition to 210-230 ml of NS at this time. In PM, will keep premedication of 2g of morphine for remaining 250 ml of NS in PM performed by Plastics   - No need for further imaging at this time, will need US or CT scan once drain is removed.     #Acute pain  - Continue flexeril 5mg TID scheduled for muscle spasms  - Continue Lidocaine patches at drain  site  - DC norco- patient reports not using it  - Zofran 4mg scheduled prior to flagyl dose     #Asthma  - Continue home Breo Ellipta and Singulair  - Stable, consult RT as needed    F: none, encourage PO intake  E: monitor and replete PRN  N: regular diet  GI: Colace BID   Dvt ppx: lovenox 40mg daily, SCD's, encourage ambulation     - Patient/plan discussed with Dr. Schmitz    Disposition: Continue care on RNF. Will remain inpatient with plans for continued PO antibiotic therapy pending and continued drain and wound care.    Evy Richards PA-C  Plastic and Reconstructive Surgery   Available via Epic chat, pager: [phone] or team phones: [phone]          [1] ciprofloxacin, 750 mg, oral, q12h DIONI  cyclobenzaprine, 5 mg, oral, TID  enoxaparin, 40 mg, subcutaneous, q24h  fluticasone furoate-vilanteroL, 1 puff,  inhalation, Daily  ketorolac, 30 mg, intravenous, q6h DIONI  lidocaine, 1 patch, transdermal, Daily  metroNIDAZOLE, 500 mg, oral, q8h DIONI  montelukast, 10 mg, oral, q AM  ondansetron, 4 mg, intravenous, q8h     [2]    [3] PRN medications: diphenhydrAMINE

## 2025-06-03 NOTE — PROGRESS NOTES
"Alisha Springer \"Talia" is a 59 y.o. female on day 9 of admission presenting with Wound dehiscence.    Subjective   Interval History:   She is feeling rundown as she is not sleeping well.  Overall however she feels better   She is tolerating the antibiotics with the addition of Zofran.  Her drain remains in place in the right lower quadrant.  She has questions as to how long it will remain in place.        Review of Systems    Objective   Range of Vitals (last 24 hours)  Heart Rate:  [67-80]   Temp:  [36 °C (96.8 °F)-37.1 °C (98.8 °F)]   Resp:  [16]   BP: ()/(60-70)   Weight:  [78.3 kg (172 lb 9.9 oz)]   SpO2:  [95 %-99 %]   Daily Weight  06/02/25 : 78.3 kg (172 lb 9.9 oz)    Body mass index is 30.58 kg/m².    Physical Exam  CONSTITUTIONAL: Well appearing but appears tired, NAD, cooperative  SKIN:  No rashes or lesions. Right thigh induration persists.  Less erythema compared with photos but remains within the fading, marked borders. Drain in place with seropurulent fluid  EYES: PERRLA, EOMI, Sclera anicteric.  No conjunctival injection.    ENMT: MMM, No oral lesions or thrush. Dentition in good repair.    CVS: RRR, S1 & S2 normal.  No murmurs, rubs or gallops.  Pedal pulses intact.  RESPIRATORY/CHEST: Clear in anterolateral lung fields.  No rales or rhonchi  GI: Soft, ND.  Tender around abdominal dressing and around abdominal drain.  Wound dressing not removed due to her discomfort. Photos reviewed in media tab.  No rebound or guarding  EXT: No CCE.     Antibiotics  ciprofloxacin - 500 mg  metroNIDAZOLE - 500 mg  vancomycin - 1 gram/200 mL    Relevant Results  Labs  Results from last 72 hours   Lab Units 06/02/25  0618 06/01/25  0602 05/31/25  0619   WBC AUTO x10*3/uL 9.1 7.5 9.2   HEMOGLOBIN g/dL 10.6* 11.1* 11.5*   HEMATOCRIT % 34.1* 39.0 37.0   PLATELETS AUTO x10*3/uL 313 282 369     Results from last 72 hours   Lab Units 06/02/25  0618 06/01/25  0602 05/31/25  0619   SODIUM mmol/L 137 139 141   POTASSIUM " mmol/L 4.7 4.3 4.2   CHLORIDE mmol/L 106 108* 106   CO2 mmol/L 24 23 26   BUN mg/dL 24* 23 18   CREATININE mg/dL 0.62 0.56 0.52   GLUCOSE mg/dL 99 94 104*   CALCIUM mg/dL 8.2* 8.2* 8.5*   ANION GAP mmol/L 12 12 13   EGFR mL/min/1.73m*2 >90 >90 >90         Estimated Creatinine Clearance: 96.9 mL/min (by C-G formula based on SCr of 0.62 mg/dL).  C-Reactive Protein   Date Value Ref Range Status   06/02/2025 1.31 (H) <1.00 mg/dL Final   06/01/2025 1.69 (H) <1.00 mg/dL Final   05/31/2025 2.63 (H) <1.00 mg/dL Final     Microbiology  Susceptibility data from last 14 days.  Collected Specimen Info Organism Amoxicillin/Clavulanate Ampicillin Ampicillin/Sulbactam Aztreonam Cefazolin Cefepime Ceftazidime Ceftriaxone Ciprofloxacin Ertapenem Gentamicin Imipenem Levofloxacin Meropenem   05/30/25 Fluid from Aspirate Morganella morganii                 05/25/25 Tissue/Biopsy from Wound/Tissue Pseudomonas aeruginosa     S   S  S   S     S      Morganella morganii  R  R  R   R    S  S  S  I  I  S  S     Mixed Anaerobic Bacteria                   Collected Specimen Info Organism Piperacillin/Tazobactam Tobramycin Trimethoprim/Sulfamethoxazole   05/30/25 Fluid from Aspirate Morganella morganii      05/25/25 Tissue/Biopsy from Wound/Tissue Pseudomonas aeruginosa  S  S      Morganella morganii  S  S  S     Mixed Anaerobic Bacteria          Imaging  No new imaging     Assessment/Plan   59-year-old woman who lost a substantial amount of weight during her conversion to a healthy lifestyle of exercise, and eating right.  Quite an inspiring story of motivation to be better for her family and friends.     On 5/12/2025 patient underwent panniculectomy and ventral abdominal wall hernia repair with bioabsorbable mesh.  SUNSHINE drains were removed 5/19/2025.  At that time there was suspicion of possible superficial ischemic changes in the umbilical area.  Patient ultimately developed drainage and then presented on 5/24/2025 with purulent malodorous  drainage.  Patient had bedside I&D on 5/25/25 and was started on IV vancomycin, metronidazole and ciprofloxacin, which have been well-tolerated.  In the past, she had received levofloxacin.  Cultures have grown mixed anaerobic bacteria, pan-susceptible Pseudomonas aeruginosa and Morganella morganii (susceptibilities above).  On 5/31, she underwent drain placement into right lateral flank fluid collection that is underlying the area of upper thigh/pelvis induration. Initial aspirate was brown, purulent material.     She reported nausea and no appetite since admission, felt  possibly due to Metronidazole.  It has improved with addition of Zofran prior to metronidazole dosing.     Polymicrobial postoperative panniculectomy wound infection with secondary fluid collection in the lateral flank (film not available to review)     5/25 Bedside debridement sample sent to laboratory      Culture growing mixed anaerobes, Pseudomonas and Morganella     Continue current antibiotics.  Hoping to have oral regimen to treat.          FINAL RECOMMENDATIONS:       Discontinue Vancomycin   Continue Cipro to 750 mg PO BID  Continue oral Metronidazole 500mg PO TID with pretreatment with Zofran  Duration of therapy will be determined at ID follow-up appointment with Dr. Villalobos.  Send her out with a script for a full 30 days supply of antibiotics with 1 refill.  After discharge, the following labs will need to be collected weekly:  CBC with diff, CMP, and quantitative CRP,   Fax all results to [phone], attn. Dr. Pierre Villalobos   An appointment in the Infectious Disease clinic in [place] will be made with Dr. Pierre Villalobos.  Please enter this information into the Discharge Orders.     Thank-you for allowing us to assist in your patient's management.  We are signing off.  Call if any further issues should arise or you should have any questions.     I spent 45 minutes in the professional and overall care of this patient.    Shakira ZAIDI  MD Kj  (please reach through EPIC Chat)  Infectious Diseases, Senior Attending Physician

## 2025-06-04 ENCOUNTER — APPOINTMENT (OUTPATIENT)
Dept: RADIOLOGY | Facility: HOSPITAL | Age: 60
End: 2025-06-04
Payer: COMMERCIAL

## 2025-06-04 PROCEDURE — 2500000001 HC RX 250 WO HCPCS SELF ADMINISTERED DRUGS (ALT 637 FOR MEDICARE OP)

## 2025-06-04 PROCEDURE — 76705 ECHO EXAM OF ABDOMEN: CPT

## 2025-06-04 PROCEDURE — 1170000001 HC PRIVATE ONCOLOGY ROOM DAILY

## 2025-06-04 PROCEDURE — 2500000004 HC RX 250 GENERAL PHARMACY W/ HCPCS (ALT 636 FOR OP/ED): Performed by: NURSE PRACTITIONER

## 2025-06-04 PROCEDURE — 2500000005 HC RX 250 GENERAL PHARMACY W/O HCPCS: Performed by: NURSE PRACTITIONER

## 2025-06-04 PROCEDURE — 2500000004 HC RX 250 GENERAL PHARMACY W/ HCPCS (ALT 636 FOR OP/ED)

## 2025-06-04 PROCEDURE — 76705 ECHO EXAM OF ABDOMEN: CPT | Performed by: RADIOLOGY

## 2025-06-04 RX ADMIN — KETOROLAC TROMETHAMINE 30 MG: 30 INJECTION, SOLUTION INTRAMUSCULAR; INTRAVENOUS at 17:28

## 2025-06-04 RX ADMIN — METRONIDAZOLE 500 MG: 500 TABLET ORAL at 05:47

## 2025-06-04 RX ADMIN — CIPROFOLXACIN 750 MG: 500 TABLET ORAL at 08:56

## 2025-06-04 RX ADMIN — KETOROLAC TROMETHAMINE 30 MG: 30 INJECTION, SOLUTION INTRAMUSCULAR; INTRAVENOUS at 11:57

## 2025-06-04 RX ADMIN — CIPROFOLXACIN 750 MG: 500 TABLET ORAL at 20:41

## 2025-06-04 RX ADMIN — ENOXAPARIN SODIUM 40 MG: 100 INJECTION SUBCUTANEOUS at 23:25

## 2025-06-04 RX ADMIN — CYCLOBENZAPRINE HYDROCHLORIDE 5 MG: 10 TABLET, FILM COATED ORAL at 20:41

## 2025-06-04 RX ADMIN — ONDANSETRON 4 MG: 2 INJECTION INTRAMUSCULAR; INTRAVENOUS at 05:47

## 2025-06-04 RX ADMIN — FLUTICASONE FUROATE AND VILANTEROL TRIFENATATE 1 PUFF: 100; 25 POWDER RESPIRATORY (INHALATION) at 06:00

## 2025-06-04 RX ADMIN — LIDOCAINE 4% 1 PATCH: 40 PATCH TOPICAL at 08:56

## 2025-06-04 RX ADMIN — METRONIDAZOLE 500 MG: 500 TABLET ORAL at 23:25

## 2025-06-04 RX ADMIN — CYCLOBENZAPRINE HYDROCHLORIDE 5 MG: 10 TABLET, FILM COATED ORAL at 08:56

## 2025-06-04 RX ADMIN — CYCLOBENZAPRINE HYDROCHLORIDE 5 MG: 10 TABLET, FILM COATED ORAL at 14:59

## 2025-06-04 RX ADMIN — METRONIDAZOLE 500 MG: 500 TABLET ORAL at 14:59

## 2025-06-04 RX ADMIN — KETOROLAC TROMETHAMINE 30 MG: 30 INJECTION, SOLUTION INTRAMUSCULAR; INTRAVENOUS at 05:47

## 2025-06-04 RX ADMIN — MONTELUKAST 10 MG: 10 TABLET, FILM COATED ORAL at 08:56

## 2025-06-04 RX ADMIN — ONDANSETRON 4 MG: 2 INJECTION INTRAMUSCULAR; INTRAVENOUS at 14:59

## 2025-06-04 RX ADMIN — KETOROLAC TROMETHAMINE 30 MG: 30 INJECTION, SOLUTION INTRAMUSCULAR; INTRAVENOUS at 23:25

## 2025-06-04 RX ADMIN — ONDANSETRON 4 MG: 2 INJECTION INTRAMUSCULAR; INTRAVENOUS at 23:25

## 2025-06-04 ASSESSMENT — COGNITIVE AND FUNCTIONAL STATUS - GENERAL
DAILY ACTIVITIY SCORE: 24
DAILY ACTIVITIY SCORE: 24
MOBILITY SCORE: 23
MOBILITY SCORE: 24
CLIMB 3 TO 5 STEPS WITH RAILING: A LITTLE

## 2025-06-04 ASSESSMENT — PAIN - FUNCTIONAL ASSESSMENT
PAIN_FUNCTIONAL_ASSESSMENT: 0-10
PAIN_FUNCTIONAL_ASSESSMENT: 0-10

## 2025-06-04 ASSESSMENT — PAIN SCALES - GENERAL
PAINLEVEL_OUTOF10: 3
PAINLEVEL_OUTOF10: 3

## 2025-06-04 NOTE — PROGRESS NOTES
"  Department of Plastic and Reconstructive Surgery  Daily Progress Note    Patient Name: Alisha Springer  MRN: [id number]  Date:  06/04/25     Subjective  Found resting in bed comfortably this AM. IR drain fell out this morning while patient was ambulating to the bathroom. States a little drainage came out at the time but none since then. States she is still having some pain to her midline abdominal wound, mostly to the left side  and describes it as a burning sensation but it is tolerable. Eating, drinking, and ambulating well. Denies any fever, chills, night sweats, CP, SOB, headache.        Overnight Events  None     Objective    Vital Signs  /70 (BP Location: Left arm, Patient Position: Lying)   Pulse 79   Temp 35.6 °C (96.1 °F) (Temporal)   Resp 18   Ht 1.6 m (5' 3\")   Wt 74 kg (163 lb 2.3 oz)   SpO2 98%   BMI 28.90 kg/m²      Physical Exam:  Constitutional: A&Ox3, calm and cooperative, NAD.  Eyes: PERRL, EOMI  ENMT: Moist mucous membranes, no apparent injuries or lesions.  Head/Neck: NC/AT.   Cardiovascular: Normal rate and regular rhythm. 2+ equal pulses of the distal extremities.  Respiratory/Thorax: Regular respirations on RA. Good symmetric chest expansion.   Gastrointestinal: Abdomen soft, tender surrounding midline wound, pink granulation tissue with healthy bleeding tissue at wound bed, few areas of yellow fibrinous exudate, Vashe WTD dressing replaced. Improving surrounding erythema compared to previous exams. Remaining lower abdominal incision is EDWIN, well healing.   Genitourinary: voiding independently   Extremities: No peripheral edema. Moving all 4 extremities actively.   Neurological: A&Ox3.   Psychological: Appropriate mood and behavior.      Diagnostics   No results found for this or any previous visit (from the past 24 hours).    Imaging  US guided percutaneous peritoneal or retroperitoneal fluid collection drainage  Result Date: 5/30/2025  Uneventful ultrasound guided 8 Tongan pigtail " drainage catheter placement, as detailed above.   I was present for and/or performed the critical portions of the procedure and immediately available throughout the entire procedure.   I personally reviewed the images/study and I agree with the resident Wai Nguyen's findings as stated. This study was interpreted at [facility], [place], [place].   Performed and dictated at [facility].   MACRO: None   Signed by: Carmen Dotson 5/30/2025 6:38 PM Dictation workstation:   [id number]      Cardiology, Vascular, and Other Imaging  No other imaging results found for the past 7 days        Current Medications  Scheduled medications  Scheduled Medications[1]  Continuous medications  Continuous Medications[2]  PRN medications  PRN Medications[3]     Assessment  Alisha Springer is a 59 y.o. female with a past medical history of asthma, GERD s/p fundoplication, depression, endometriosis, nephrolithiasis, rectocele, female cystocele who is s/p [id number] panniculectomy, ventral hernia repair, sacrospinous ligament suspension for cystocele on 05/12/2025 with Dr. Allison and Dr. Thompson who presented to the ED as a transfer from [facility] for admission under plastic surgery for surgical wound infection of her abdomen. At [facility], CT abdomen pelvis showed soft tissue stranding in the abdominal wall with multiple air bubbles, and large fluid collection along the right lateral aspect of the pelvis. On arrival, patient was hemodynamically stable. She has complaints of extreme nausea without vomiting, significant pain at her abdomen, fever and chills overnight. AN excision debridement at bedside with application of irrigating wound vac was placed. She was admitted under plastic surgery service for IV antibiotics and continued wound care with irrigating wound vac.     Plan/Recommendations  S/P [id number] panniculectomy and ventral hernia repair with Dr. Allison  05/12/2025  #Wound infection  - 5/31 white/yellow fibrinous exudate present in wound bed which was removed via bedside debridement- see procedure note  - Bedside wound cultures obtained 5/25:       ·  4+ abundant morganella morganii, 2+ few pseudomonas aeruginosa, 4+ Abundant polymorphonuclear leukocytes, 4+ abundant mixed gram positive and gram negative bacteria  -Fluid from aspiration culture on 5/30 final results show 1 colony Morganella morganii (6/4)  - Final ID recs as follows:   -Duration of therapy will be determined at ID follow-up with Dr. Villalobos. Send out with a script for a full 30 day supply of antibiotics with 1 refill   -After discharge, following labs will need to be collected weekly: CBC w diff, CMP, and quantitative CRP, fax all results to [phone] attn. Dr. iPerre Villalobos   -An appointment in the Infectious Disease clinic in [place] will be made with Dr. Pierre Villalobos.  Please enter this information into the Discharge Orders.        ·  DC Vancomycin (6/2)       ·  PO flagyl 500mg TID            · continue scheduled zofran 30 minutes prior to flagyl dose       ·  PO Cipro 750mg BID  - Incsional dressing removed per attending       ·  Add adaptic to wound bed and continue with BID kerlex WTD dressings but switch from acetic acid to Vashe AM and saline PM             · Plastics to do AM dressing, nursing to perform PM dressing  - Vital signs Q4  - IR drain incidentally removed 6/4  - US abdomen ordered 6/4 to assess for any remaining fluid collection, pending results     #Acute pain  - Continue flexeril 5mg TID scheduled for muscle spasms  - Continue Lidocaine patches at drain  site  - DC norco- patient reports not using it  - Zofran 4mg scheduled prior to flagyl dose     #Asthma  - Continue home Breo Ellipta and Singulair  - Stable, consult RT as needed    F: none, encourage PO intake  E: monitor and replete PRN  N: regular diet  GI: Colace BID   Dvt ppx: lovenox 40mg daily, SCD's, encourage  ambulation     - Patient/plan discussed with Dr. Schmitz    Disposition: Continue care on RNF. Will remain inpatient with plans for continued PO antibiotic therapy pending and continued drain and wound care.    Linda Davis PA-C  Plastic and Reconstructive Surgery   Available via Epic chat, pager: [phone] or team phones: [phone]          [1] ciprofloxacin, 750 mg, oral, q12h DIONI  cyclobenzaprine, 5 mg, oral, TID  enoxaparin, 40 mg, subcutaneous, q24h  fluticasone furoate-vilanteroL, 1 puff, inhalation, Daily  ketorolac, 30 mg, intravenous, q6h DIONI  lidocaine, 1 patch, transdermal, Daily  metroNIDAZOLE, 500 mg, oral, q8h DIONI  montelukast, 10 mg, oral, q AM  ondansetron, 4 mg, intravenous, q8h     [2]    [3] PRN medications: diphenhydrAMINE

## 2025-06-05 ENCOUNTER — APPOINTMENT (OUTPATIENT)
Dept: RADIOLOGY | Facility: HOSPITAL | Age: 60
End: 2025-06-05
Payer: COMMERCIAL

## 2025-06-05 DIAGNOSIS — T14.8XXA WOUND INFECTION: ICD-10-CM

## 2025-06-05 DIAGNOSIS — L08.9 WOUND INFECTION: ICD-10-CM

## 2025-06-05 LAB
ANION GAP SERPL CALC-SCNC: 11 MMOL/L (ref 10–20)
BUN SERPL-MCNC: 19 MG/DL (ref 6–23)
CALCIUM SERPL-MCNC: 8.1 MG/DL (ref 8.6–10.6)
CHLORIDE SERPL-SCNC: 108 MMOL/L (ref 98–107)
CO2 SERPL-SCNC: 25 MMOL/L (ref 21–32)
CREAT SERPL-MCNC: 0.59 MG/DL (ref 0.5–1.05)
CRP SERPL-MCNC: 0.87 MG/DL
EGFRCR SERPLBLD CKD-EPI 2021: >90 ML/MIN/1.73M*2
ERYTHROCYTE [DISTWIDTH] IN BLOOD BY AUTOMATED COUNT: 13.8 % (ref 11.5–14.5)
ERYTHROCYTE [SEDIMENTATION RATE] IN BLOOD BY WESTERGREN METHOD: 31 MM/H (ref 0–30)
GLUCOSE SERPL-MCNC: 100 MG/DL (ref 74–99)
HCT VFR BLD AUTO: 34.1 % (ref 36–46)
HGB BLD-MCNC: 10.7 G/DL (ref 12–16)
MCH RBC QN AUTO: 30.3 PG (ref 26–34)
MCHC RBC AUTO-ENTMCNC: 31.4 G/DL (ref 32–36)
MCV RBC AUTO: 97 FL (ref 80–100)
NRBC BLD-RTO: 0 /100 WBCS (ref 0–0)
PLATELET # BLD AUTO: 299 X10*3/UL (ref 150–450)
POTASSIUM SERPL-SCNC: 4.2 MMOL/L (ref 3.5–5.3)
RBC # BLD AUTO: 3.53 X10*6/UL (ref 4–5.2)
SODIUM SERPL-SCNC: 140 MMOL/L (ref 136–145)
WBC # BLD AUTO: 8.6 X10*3/UL (ref 4.4–11.3)

## 2025-06-05 PROCEDURE — 2500000001 HC RX 250 WO HCPCS SELF ADMINISTERED DRUGS (ALT 637 FOR MEDICARE OP)

## 2025-06-05 PROCEDURE — 74177 CT ABD & PELVIS W/CONTRAST: CPT | Performed by: RADIOLOGY

## 2025-06-05 PROCEDURE — 2500000005 HC RX 250 GENERAL PHARMACY W/O HCPCS: Performed by: NURSE PRACTITIONER

## 2025-06-05 PROCEDURE — 85652 RBC SED RATE AUTOMATED: CPT

## 2025-06-05 PROCEDURE — 2500000004 HC RX 250 GENERAL PHARMACY W/ HCPCS (ALT 636 FOR OP/ED)

## 2025-06-05 PROCEDURE — 36415 COLL VENOUS BLD VENIPUNCTURE: CPT

## 2025-06-05 PROCEDURE — 85027 COMPLETE CBC AUTOMATED: CPT

## 2025-06-05 PROCEDURE — 2550000001 HC RX 255 CONTRASTS

## 2025-06-05 PROCEDURE — 80048 BASIC METABOLIC PNL TOTAL CA: CPT

## 2025-06-05 PROCEDURE — 1170000001 HC PRIVATE ONCOLOGY ROOM DAILY

## 2025-06-05 PROCEDURE — 74177 CT ABD & PELVIS W/CONTRAST: CPT

## 2025-06-05 PROCEDURE — 2500000004 HC RX 250 GENERAL PHARMACY W/ HCPCS (ALT 636 FOR OP/ED): Performed by: NURSE PRACTITIONER

## 2025-06-05 PROCEDURE — 99232 SBSQ HOSP IP/OBS MODERATE 35: CPT

## 2025-06-05 PROCEDURE — 86140 C-REACTIVE PROTEIN: CPT

## 2025-06-05 RX ORDER — CIPROFLOXACIN 750 MG/1
750 TABLET, FILM COATED ORAL EVERY 12 HOURS SCHEDULED
Qty: 60 TABLET | Refills: 1 | Status: SHIPPED | OUTPATIENT
Start: 2025-06-05

## 2025-06-05 RX ORDER — BISMUTH SUBSALICYLATE 525 MG/30ML
524 LIQUID ORAL EVERY 6 HOURS PRN
Status: DISCONTINUED | OUTPATIENT
Start: 2025-06-05 | End: 2025-06-06 | Stop reason: HOSPADM

## 2025-06-05 RX ORDER — CALCIUM CARBONATE 200(500)MG
500 TABLET,CHEWABLE ORAL DAILY
Status: DISCONTINUED | OUTPATIENT
Start: 2025-06-05 | End: 2025-06-06 | Stop reason: HOSPADM

## 2025-06-05 RX ORDER — ONDANSETRON 4 MG/1
4 TABLET, FILM COATED ORAL EVERY 8 HOURS PRN
Qty: 30 TABLET | Refills: 1 | Status: SHIPPED | OUTPATIENT
Start: 2025-06-05

## 2025-06-05 RX ORDER — METRONIDAZOLE 500 MG/1
500 TABLET ORAL EVERY 8 HOURS SCHEDULED
Qty: 90 TABLET | Refills: 1 | Status: SHIPPED | OUTPATIENT
Start: 2025-06-05

## 2025-06-05 RX ORDER — BISMUTH SUBSALICYLATE 525 MG/30ML
524 LIQUID ORAL EVERY 6 HOURS PRN
Status: DISCONTINUED | OUTPATIENT
Start: 2025-06-05 | End: 2025-06-05

## 2025-06-05 RX ADMIN — LIDOCAINE 4% 1 PATCH: 40 PATCH TOPICAL at 08:35

## 2025-06-05 RX ADMIN — CIPROFOLXACIN 750 MG: 500 TABLET ORAL at 20:08

## 2025-06-05 RX ADMIN — FLUTICASONE FUROATE AND VILANTEROL TRIFENATATE 1 PUFF: 100; 25 POWDER RESPIRATORY (INHALATION) at 09:08

## 2025-06-05 RX ADMIN — CYCLOBENZAPRINE HYDROCHLORIDE 5 MG: 10 TABLET, FILM COATED ORAL at 20:08

## 2025-06-05 RX ADMIN — CIPROFOLXACIN 750 MG: 500 TABLET ORAL at 08:37

## 2025-06-05 RX ADMIN — MONTELUKAST 10 MG: 10 TABLET, FILM COATED ORAL at 08:37

## 2025-06-05 RX ADMIN — ONDANSETRON 4 MG: 2 INJECTION INTRAMUSCULAR; INTRAVENOUS at 23:27

## 2025-06-05 RX ADMIN — Medication 524 MG: at 14:48

## 2025-06-05 RX ADMIN — CALCIUM CARBONATE 1 TABLET: 500 TABLET, CHEWABLE ORAL at 14:49

## 2025-06-05 RX ADMIN — CYCLOBENZAPRINE HYDROCHLORIDE 5 MG: 10 TABLET, FILM COATED ORAL at 14:49

## 2025-06-05 RX ADMIN — METRONIDAZOLE 500 MG: 500 TABLET ORAL at 23:27

## 2025-06-05 RX ADMIN — METRONIDAZOLE 500 MG: 500 TABLET ORAL at 06:21

## 2025-06-05 RX ADMIN — ONDANSETRON 4 MG: 2 INJECTION INTRAMUSCULAR; INTRAVENOUS at 14:49

## 2025-06-05 RX ADMIN — IOHEXOL 75 ML: 350 INJECTION, SOLUTION INTRAVENOUS at 17:12

## 2025-06-05 RX ADMIN — ONDANSETRON 4 MG: 2 INJECTION INTRAMUSCULAR; INTRAVENOUS at 06:21

## 2025-06-05 RX ADMIN — ENOXAPARIN SODIUM 40 MG: 100 INJECTION SUBCUTANEOUS at 23:27

## 2025-06-05 RX ADMIN — CYCLOBENZAPRINE HYDROCHLORIDE 5 MG: 10 TABLET, FILM COATED ORAL at 08:37

## 2025-06-05 RX ADMIN — METRONIDAZOLE 500 MG: 500 TABLET ORAL at 14:49

## 2025-06-05 ASSESSMENT — COGNITIVE AND FUNCTIONAL STATUS - GENERAL
MOBILITY SCORE: 24
DAILY ACTIVITIY SCORE: 24

## 2025-06-05 ASSESSMENT — PAIN - FUNCTIONAL ASSESSMENT
PAIN_FUNCTIONAL_ASSESSMENT: 0-10
PAIN_FUNCTIONAL_ASSESSMENT: 0-10

## 2025-06-05 ASSESSMENT — PAIN SCALES - GENERAL
PAINLEVEL_OUTOF10: 3
PAINLEVEL_OUTOF10: 2

## 2025-06-05 ASSESSMENT — PAIN DESCRIPTION - DESCRIPTORS: DESCRIPTORS: BURNING

## 2025-06-05 NOTE — PROGRESS NOTES
"  Department of Plastic and Reconstructive Surgery  Daily Progress Note    Patient Name: Alisha Springer  MRN: [id number]  Date:  06/05/25     Subjective  Found resting in bed comfortably this AM. States she feels getting a CT scan to make sure there is no fluid remaining in abdomen since drain fell out would make her feel more comfortable with going home. Endorses nausea this AM. Pain is well controlled. Eating, drinking, and ambulating well. Denies any fever, chills, night sweats, CP, SOB, headache.        Overnight Events  None     Objective    Vital Signs  /67   Pulse 72   Temp 36 °C (96.8 °F) (Temporal)   Resp 14   Ht 1.6 m (5' 3\")   Wt 76 kg (167 lb 8.8 oz)   SpO2 99%   BMI 29.68 kg/m²      Physical Exam:  Constitutional: A&Ox3, calm and cooperative, NAD.  Eyes: PERRL, EOMI  ENMT: Moist mucous membranes, no apparent injuries or lesions.  Head/Neck: NC/AT.   Cardiovascular: Normal rate and regular rhythm. 2+ equal pulses of the distal extremities.  Respiratory/Thorax: Regular respirations on RA. Good symmetric chest expansion.   Gastrointestinal: Abdomen soft, tender surrounding midline wound, pink granulation tissue with healthy bleeding tissue at wound bed, few areas of yellow fibrinous exudate, Vashe WTD dressing replaced. Improving surrounding erythema compared to previous exams. Remaining lower abdominal incision is EDWIN, well healing.   Genitourinary: voiding independently   Extremities: No peripheral edema. Moving all 4 extremities actively.   Neurological: A&Ox3.   Psychological: Appropriate mood and behavior.      Diagnostics   No results found for this or any previous visit (from the past 24 hours).    Imaging  US abdomen limited  Result Date: 6/4/2025  Unremarkable ultrasound of the right upper quadrant. No sonographic evidence of loculated fluid collection in the visualized right upper quadrant.     I personally reviewed the images/study and I agree with the findings as stated by Radiology " resident.   MACRO: None   Signed by: Chintan Faith 6/4/2025 6:45 PM Dictation workstation:   [id number]    US guided percutaneous peritoneal or retroperitoneal fluid collection drainage  Result Date: 5/30/2025  Uneventful ultrasound guided 8 Qatari pigtail drainage catheter placement, as detailed above.   I was present for and/or performed the critical portions of the procedure and immediately available throughout the entire procedure.   I personally reviewed the images/study and I agree with the resident Wai Nguyen's findings as stated. This study was interpreted at [place], [place].   Performed and dictated at [facility].   MACRO: None   Signed by: Carmen Dotson 5/30/2025 6:38 PM Dictation workstation:   [id number]      Cardiology, Vascular, and Other Imaging  No other imaging results found for the past 7 days        Current Medications  Scheduled medications  Scheduled Medications[1]  Continuous medications  Continuous Medications[2]  PRN medications  PRN Medications[3]     Assessment  Alisha Springer is a 59 y.o. female with a past medical history of asthma, GERD s/p fundoplication, depression, endometriosis, nephrolithiasis, rectocele, female cystocele who is s/p [id number] panniculectomy, ventral hernia repair, sacrospinous ligament suspension for cystocele on 05/12/2025 with Dr. Allison and Dr. Thompson who presented to the ED as a transfer from [facility] for admission under plastic surgery for surgical wound infection of her abdomen. At [facility], CT abdomen pelvis showed soft tissue stranding in the abdominal wall with multiple air bubbles, and large fluid collection along the right lateral aspect of the pelvis. On arrival, patient was hemodynamically stable. She has complaints of extreme nausea without vomiting, significant pain at her abdomen, fever and chills overnight. AN excision debridement at bedside with  application of irrigating wound vac was placed. She was admitted under plastic surgery service for IV antibiotics and continued wound care with irrigating wound vac.     Plan/Recommendations  S/P daniele panniculectomy and ventral hernia repair with Dr. Allison 05/12/2025  #Wound infection  - 5/31 white/yellow fibrinous exudate present in wound bed which was removed via bedside debridement- see procedure note  - Bedside wound cultures obtained 5/25:       ·  4+ abundant morganella morganii, 2+ few pseudomonas aeruginosa, 4+ Abundant polymorphonuclear leukocytes, 4+ abundant mixed gram positive and gram negative bacteria  -Fluid from aspiration culture on 5/30 final results show 1 colony Morganella morganii (6/4)  - Final ID recs as follows:   -Duration of therapy will be determined at ID follow-up with Dr. Villalobos. Send out with a script for a full 30 day supply of antibiotics with 1 refill   -After discharge, following labs will need to be collected weekly: CBC w diff, CMP, and quantitative CRP, fax all results to [phone] attn. Dr. Pierre Villalobos   -An appointment in the Infectious Disease clinic in [place] will be made with Dr. Pierre Villalobos.  Please enter this information into the Discharge Orders.        ·  DC Vancomycin (6/2)       ·  PO flagyl 500mg TID            · continue scheduled zofran 30 minutes prior to flagyl dose       ·  PO Cipro 750mg BID  - Incsional dressing removed per attending       ·  Add adaptic to wound bed and continue with BID kerlex WTD dressings but switch from acetic acid to Vashe AM and saline PM             · Plastics to do AM dressing, nursing to perform PM dressing  - Vital signs Q4  - IR drain incidentally removed 6/4  - US abdomen of RLQ, LLQ, and midline ordered 6/4 to assess for any remaining fluid collection, scanned only the RUQ which showed no fluid collection.  -Ordered CT abdomen pelvis with contrast(6/5) to assess for any remaining fluid collection, pending  results      #Acute pain  - Continue flexeril 5mg TID scheduled for muscle spasms  - Continue Lidocaine patches at drain  site  - DC norco- patient reports not using it  - Zofran 4mg scheduled prior to flagyl dose     #Asthma  - Continue home Breo Ellipta and Singulair  - Stable, consult RT as needed    F: none, encourage PO intake  E: monitor and replete PRN  N: regular diet  GI: Colace BID   Dvt ppx: lovenox 40mg daily, SCD's, encourage ambulation     - Patient/plan discussed with Dr. Schmitz    Disposition: Continue care on RNF. Will remain inpatient with plans for continued PO antibiotic therapy pending and continued drain and wound care.    Linda Davis PA-C  Plastic and Reconstructive Surgery   Available via Epic chat, pager: [phone] or team phones: [phone]          [1] ciprofloxacin, 750 mg, oral, q12h DIONI  cyclobenzaprine, 5 mg, oral, TID  enoxaparin, 40 mg, subcutaneous, q24h  fluticasone furoate-vilanteroL, 1 puff, inhalation, Daily  lidocaine, 1 patch, transdermal, Daily  metroNIDAZOLE, 500 mg, oral, q8h DIONI  montelukast, 10 mg, oral, q AM  ondansetron, 4 mg, intravenous, q8h     [2]    [3] PRN medications: bismuth subsalicylate, diphenhydrAMINE

## 2025-06-05 NOTE — CARE PLAN
The patient's goals for the shift include      The clinical goals for the shift include Pt will be safe, comfortble, and HD stable overnight.      Problem: Pain - Adult  Goal: Verbalizes/displays adequate comfort level or baseline comfort level  Outcome: Progressing     Problem: Safety - Adult  Goal: Free from fall injury  Outcome: Progressing     Problem: Discharge Planning  Goal: Discharge to home or other facility with appropriate resources  Outcome: Progressing     Problem: Chronic Conditions and Co-morbidities  Goal: Patient's chronic conditions and co-morbidity symptoms are monitored and maintained or improved  Outcome: Progressing     Problem: Nutrition  Goal: Nutrient intake appropriate for maintaining nutritional needs  Outcome: Progressing     Problem: Pain  Goal: Takes deep breaths with improved pain control throughout the shift  Outcome: Progressing  Goal: Turns in bed with improved pain control throughout the shift  Outcome: Progressing  Goal: Walks with improved pain control throughout the shift  Outcome: Progressing  Goal: Performs ADL's with improved pain control throughout shift  Outcome: Progressing  Goal: Participates in PT with improved pain control throughout the shift  Outcome: Progressing  Goal: Free from opioid side effects throughout the shift  Outcome: Progressing  Goal: Free from acute confusion related to pain meds throughout the shift  Outcome: Progressing     Problem: Fall/Injury  Goal: Not fall by end of shift  Outcome: Progressing  Goal: Be free from injury by end of the shift  Outcome: Progressing  Goal: Verbalize understanding of personal risk factors for fall in the hospital  Outcome: Progressing  Goal: Verbalize understanding of risk factor reduction measures to prevent injury from fall in the home  Outcome: Progressing  Goal: Use assistive devices by end of the shift  Outcome: Progressing  Goal: Pace activities to prevent fatigue by end of the shift  Outcome: Progressing

## 2025-06-06 ENCOUNTER — DOCUMENTATION (OUTPATIENT)
Dept: HOME HEALTH SERVICES | Facility: HOME HEALTH | Age: 60
End: 2025-06-06
Payer: COMMERCIAL

## 2025-06-06 ENCOUNTER — HOME HEALTH ADMISSION (OUTPATIENT)
Dept: HOME HEALTH SERVICES | Facility: HOME HEALTH | Age: 60
End: 2025-06-06
Payer: COMMERCIAL

## 2025-06-06 VITALS
DIASTOLIC BLOOD PRESSURE: 67 MMHG | WEIGHT: 157.63 LBS | HEART RATE: 67 BPM | TEMPERATURE: 98.2 F | HEIGHT: 63 IN | OXYGEN SATURATION: 95 % | RESPIRATION RATE: 14 BRPM | BODY MASS INDEX: 27.93 KG/M2 | SYSTOLIC BLOOD PRESSURE: 111 MMHG

## 2025-06-06 DIAGNOSIS — L08.9 WOUND INFECTION: ICD-10-CM

## 2025-06-06 DIAGNOSIS — T14.8XXA WOUND INFECTION: ICD-10-CM

## 2025-06-06 PROCEDURE — 99238 HOSP IP/OBS DSCHRG MGMT 30/<: CPT

## 2025-06-06 PROCEDURE — 2500000004 HC RX 250 GENERAL PHARMACY W/ HCPCS (ALT 636 FOR OP/ED): Performed by: NURSE PRACTITIONER

## 2025-06-06 PROCEDURE — 2500000005 HC RX 250 GENERAL PHARMACY W/O HCPCS: Performed by: NURSE PRACTITIONER

## 2025-06-06 PROCEDURE — 2500000001 HC RX 250 WO HCPCS SELF ADMINISTERED DRUGS (ALT 637 FOR MEDICARE OP)

## 2025-06-06 PROCEDURE — 94640 AIRWAY INHALATION TREATMENT: CPT

## 2025-06-06 RX ADMIN — METRONIDAZOLE 500 MG: 500 TABLET ORAL at 09:15

## 2025-06-06 RX ADMIN — CALCIUM CARBONATE 1 TABLET: 500 TABLET, CHEWABLE ORAL at 09:15

## 2025-06-06 RX ADMIN — ONDANSETRON 4 MG: 2 INJECTION INTRAMUSCULAR; INTRAVENOUS at 05:37

## 2025-06-06 RX ADMIN — MONTELUKAST 10 MG: 10 TABLET, FILM COATED ORAL at 09:15

## 2025-06-06 RX ADMIN — CYCLOBENZAPRINE HYDROCHLORIDE 5 MG: 10 TABLET, FILM COATED ORAL at 09:15

## 2025-06-06 RX ADMIN — FLUTICASONE FUROATE AND VILANTEROL TRIFENATATE 1 PUFF: 100; 25 POWDER RESPIRATORY (INHALATION) at 08:15

## 2025-06-06 RX ADMIN — CIPROFOLXACIN 750 MG: 500 TABLET ORAL at 09:15

## 2025-06-06 RX ADMIN — LIDOCAINE 4% 1 PATCH: 40 PATCH TOPICAL at 09:10

## 2025-06-06 ASSESSMENT — COGNITIVE AND FUNCTIONAL STATUS - GENERAL
DAILY ACTIVITIY SCORE: 24
MOBILITY SCORE: 24

## 2025-06-06 ASSESSMENT — PAIN DESCRIPTION - DESCRIPTORS: DESCRIPTORS: BURNING

## 2025-06-06 ASSESSMENT — PAIN SCALES - GENERAL: PAINLEVEL_OUTOF10: 2

## 2025-06-06 ASSESSMENT — PAIN - FUNCTIONAL ASSESSMENT: PAIN_FUNCTIONAL_ASSESSMENT: 0-10

## 2025-06-06 NOTE — DISCHARGE SUMMARY
Discharge Diagnosis  Wound dehiscence    Issues Requiring Follow-Up  Abdominal wound  Gastritis per home GI surgeon    Test Results Pending At Discharge  Pending Labs       No current pending labs.            Hospital Course  Brief History:  Alisha Springer is a 59 y.o. female with a past medical history of asthma, GERD s/p fundoplication, depression, endometriosis, nephrolithiasis, rectocele, female cystocele who is s/p [id number] panniculectomy, ventral hernia repair, sacrospinous ligament suspension for cystocele on 05/12/2025 with Dr. Allison and Dr. Thompson who presented to the ED on 5/25 after a transfer from [facility] for admission under plastic surgery for surgical wound infection at her abdomen. At [facility], CT abdomen pelvis showed soft tissue stranding in the abdominal wall with multiple air bubbles, and large fluid collection along the right lateral aspect of the pelvis. On arrival to the ED 05/25, patient was hemodynamically stable. She had complaints of extreme nausea without vomiting, significant pain at her abdomen, fever and chills since 5/24. Overnight on 05/24 she had a low grade fever of 99.9 to 100.1 and leukocytosis (WBC 11.5) seen on labs. Upon arrival to the ED she was taking Flexeril for abdominal muscle spasms/pain and 1 Norco at night with relief of symptoms, the pain at her abdomen was worse with movement to the R side and was about a 9/10, while laying down without movement it was a 5/10. ED took wound cultures at bedside. Plastic surgery was initially consulted to admit for IV abx and wound care with irrigating wound vac.     Hospital Course:  Patient was taken to [facility] 6 Floor on 5/25 for irrigating wound vac application (Dakins solution) and IV antibiotics ( Flagyl, Vanc and Cipro). Her progress was monitored during her admission with stable VS, labs and reduction of pain. During her hospital course Tissue culture from post debridement positive for pseudomonas, Abundant Morganella  morganii and abundant mixed gram positive and gram negative bacteria. Patient was planning to be taken back to the OR for washout and debridement on 5/29 with Dr Schmitz. However, because of down trending labs and clinical picture, he felt it was better to send to IR for aspiration and drain placement(5/30). Continued with WTD dressings with kerlex and acetic acid BID on 5/30, 5/31, 6/1, and 6/2. Patient had subsequent flushing of IR drain with 500cc of NS on 6/1 and 6/2, premedicated with morphine to tolerate procedure. On 6/3,  drain flushing changed from NS to Vashe and WTD dressing changed from acetic acid to vashe AM and NS PM.  Patient did not tolerate vashe flushing of IR drain, so procedure was stopped. On 6/4 AM, IR drain incidentally fell out while patient was ambulating to bathroom. US abdomen ordered to assess for any remaining fluid on 6/4 which did not obtain images of necessary areas of the abdomen, therefore CT abdomen was ordered on 6/5. CT results indicated a decrease in size of fluid collection 8.6 x 2.0 cm from previous size of 9.9 x 4.3 cm on 05/24. Our plan is for further outpatient management of abdominal wound.     Consultations:  ID consulted 5/26 for tailoring of antibiotics for positive culture. Recommending Vancomycin, Cipro and Flagyl for antibiotics. ID recommended IR drainage for right flank fluid collection. IR consulted and aspirated 5 ml of brown purulent drainage and sent to cytology/microbiology on 5/30. Drain remained in place. Final ID recs on 6/2 are discontinue vancomycin. Discharge home on Cipro 750 BID and Flagyl 500 TID(with pretreatment of zofran). Duration of therapy will be determined at ID follow-up. After discharge, weekly CBC w diff, CMP, and quantitative CRP will need to be collected.     Day of discharge  On the day of discharge, the patient was seen and evaluated by the Plastic Surgery team and deemed suitable for discharge to home with home health care.  There  were no significant events overnight. Vitals were reviewed and within normal limits. Labs were stable at discharge.  CRP down trending 0.87 today from 1.25 yesterday. WBC 8.6. ESR 31 today slight increase from 21 yesterday. Patient overall hemodynamically stable. On day of discharge the patient was tolerating a diet, pain was controlled on PO pain medication, was ambulating well and voiding spontaneously. The patient was given detailed discharge instructions, wound care instructions and was scheduled to follow up as an outpatient. Scheduled for follow up appointment with Yumi Bowles 06/09/25 at [facility].    Pertinent Physical Exam At Time of Discharge  Physical Exam  Constitutional: A&Ox3, calm and cooperative, NAD.  Eyes: PERRL, EOMI  ENMT: Moist mucous membranes, no apparent injuries or lesions.  Head/Neck: NC/AT.   Cardiovascular: Normal rate and regular rhythm. 2+ equal pulses of the distal extremities.  Respiratory/Thorax: Regular respirations on RA. Good symmetric chest expansion.   Gastrointestinal: Abdomen soft, tender surrounding midline wound, pink granulation tissue with healthy bleeding tissue at wound bed, few areas of yellow fibrinous exudate, Vashe WTD dressing replaced in AM, Saline WTD dressing in PM. Improving surrounding erythema compared to previous exams. Remaining lower abdominal incision is EDWIN, well healing.   Genitourinary: voiding independently   Extremities: No peripheral edema. Moving all 4 extremities actively.   Neurological: A&Ox3.   Psychological: Appropriate mood and behavior.     Home Medications     Medication List      START taking these medications     ciprofloxacin 750 mg tablet; Commonly known as: Cipro; Take 1 tablet   (750 mg) by mouth every 12 hours.   metroNIDAZOLE 500 mg tablet; Commonly known as: Flagyl; Take 1 tablet   (500 mg) by mouth every 8 hours.     CONTINUE taking these medications     acetaminophen 500 mg tablet; Commonly known as:  Tylenol; Take 2 tablets   (1,000 mg) by mouth every 6 hours if needed for mild pain (1 - 3).   ascorbic acid 500 mg tablet; Commonly known as: Vitamin C   cholecalciferol 25 mcg (1,000 units) tablet; Commonly known as: Vitamin   D-3   clobetasol 0.05 % cream; Commonly known as: Temovate   cyanocobalamin 1,000 mcg tablet; Commonly known as: Vitamin B-12   cyclobenzaprine 5 mg tablet; Commonly known as: Flexeril; Take 1 tablet   (5 mg) by mouth 3 times a day as needed for muscle spasms.   Dulera 100-5 mcg/actuation inhaler; Generic drug: mometasone-formoterol   montelukast 10 mg tablet; Commonly known as: Singulair   naproxen 500 mg tablet; Commonly known as: Naprosyn   ondansetron 4 mg tablet; Commonly known as: Zofran; Take 1 tablet (4 mg)   by mouth every 8 hours if needed for nausea or vomiting.   phentermine 37.5 mg tablet; Commonly known as: Adipex-P   potassium gluconate 595 mg (99 mg) tablet   zinc gluconate 50 mg elemental tablet     STOP taking these medications     chlorhexidine 0.12 % solution; Commonly known as: Peridex   clindamycin 300 mg capsule; Commonly known as: Cleocin   HYDROcodone-acetaminophen 5-325 mg tablet; Commonly known as: Norco   ibuprofen 600 mg tablet     ASK your doctor about these medications     docusate sodium 100 mg capsule; Commonly known as: Colace; Take 1   capsule (100 mg) by mouth 2 times a day.   sodium chlor-hypochlorous acid 0.033 % irrigation solution; Irrigate   with 350 mL as directed 1 time for 1 dose. WTD AM dressing vashe with   kerlex.; Ask about: Should I take this medication?       Outpatient Follow-Up  Future Appointments   Date Time Provider Department Center   6/8/2025 To Be Determined Obinna Johansen RN [facility]   6/9/2025  2:00 PM Yumi Diaz PA-C [id number] [place]   6/27/2025  9:20 AM Pierre Villalobos MD [id number] [place]       Joyce Joya PA-C

## 2025-06-06 NOTE — HH CARE COORDINATION
Home Care received a Referral for Nursing. We have processed the referral for a Start of Care on 24-48 HOURS .     If you have any questions or concerns, please feel free to contact us at [phone]. Follow the prompts, enter your five digit zip code, and you will be directed to your care team on WEST 2.

## 2025-06-06 NOTE — PROGRESS NOTES
06/06/25 1000   Discharge Planning   Living Arrangements Children   Support Systems Family members;Children   Assistance Needed [facility] SN   Type of Residence Private residence   Who is requesting discharge planning? Provider   Home or Post Acute Services In home services   Type of Home Care Services Home nursing visits   Expected Discharge Disposition Home H     Per medical team, patient is medically ready to discharge home today. TCC notified [facility]. SOC for SN scheduled for 24-48 hours post discharge. Marilu Cunningham RN TCC

## 2025-06-08 ENCOUNTER — HOME CARE VISIT (OUTPATIENT)
Dept: HOME HEALTH SERVICES | Facility: HOME HEALTH | Age: 60
End: 2025-06-08
Payer: COMMERCIAL

## 2025-06-08 VITALS
OXYGEN SATURATION: 99 % | HEART RATE: 84 BPM | DIASTOLIC BLOOD PRESSURE: 60 MMHG | SYSTOLIC BLOOD PRESSURE: 98 MMHG | TEMPERATURE: 98.4 F

## 2025-06-08 PROCEDURE — 0023 HH SOC

## 2025-06-08 PROCEDURE — G0299 HHS/HOSPICE OF RN EA 15 MIN: HCPCS

## 2025-06-08 ASSESSMENT — LIFESTYLE VARIABLES: SMOKING_STATUS: 0

## 2025-06-08 ASSESSMENT — ENCOUNTER SYMPTOMS
PAIN LOCATION - PAIN SEVERITY: 6/10
LOWER EXTREMITY EDEMA: 1
SPUTUM CONSISTENCY: THICK
SPUTUM AMOUNT: MODERATE
MUSCLE WEAKNESS: 1
DYSPNEA ACTIVITY LEVEL: AFTER AMBULATING LESS THAN 10 FT
BOWEL PATTERN NORMAL: 1
PAIN: 1
COUGH: 1
COUGH CHARACTERISTICS: PRODUCTIVE
LAST BOWEL MOVEMENT: 67364
FATIGUES EASILY: 1
PAIN LOCATION - PAIN QUALITY: SHARP, ACHE
LOWEST PAIN SEVERITY IN PAST 24 HOURS: 5/10
SHORTNESS OF BREATH: 1
RESPIRATORY PAIN: 1
STOOL FREQUENCY: DAILY
NAUSEA: 1
SPUTUM COLOR: WHITE
COUGH CHARACTERISTICS: MOIST
FATIGUE: 1
PERSON REPORTING PAIN: PATIENT
SUBJECTIVE PAIN PROGRESSION: UNCHANGED
HIGHEST PAIN SEVERITY IN PAST 24 HOURS: 8/10
APPETITE LEVEL: GOOD
HYPOTENSION: 1
PAIN SEVERITY GOAL: 0/10
PAIN LOCATION - PAIN FREQUENCY: FREQUENT
PAIN LOCATION: ABDOMEN
CHANGE IN APPETITE: UNCHANGED
SPUTUM PRODUCTION: 1
DYSPNEA ON EXERTION: 1

## 2025-06-08 ASSESSMENT — ACTIVITIES OF DAILY LIVING (ADL)
ENTERING_EXITING_HOME: MAXIMUM ASSIST
OASIS_M1830: 05

## 2025-06-09 ENCOUNTER — OFFICE VISIT (OUTPATIENT)
Dept: PLASTIC SURGERY | Facility: CLINIC | Age: 60
End: 2025-06-09
Payer: COMMERCIAL

## 2025-06-09 ENCOUNTER — HOME CARE VISIT (OUTPATIENT)
Dept: HOME HEALTH SERVICES | Facility: HOME HEALTH | Age: 60
End: 2025-06-09
Payer: COMMERCIAL

## 2025-06-09 VITALS
HEART RATE: 88 BPM | TEMPERATURE: 98.1 F | RESPIRATION RATE: 18 BRPM | DIASTOLIC BLOOD PRESSURE: 70 MMHG | SYSTOLIC BLOOD PRESSURE: 110 MMHG | OXYGEN SATURATION: 99 %

## 2025-06-09 VITALS — WEIGHT: 157 LBS | HEIGHT: 63 IN | BODY MASS INDEX: 27.82 KG/M2

## 2025-06-09 DIAGNOSIS — T14.8XXD WOUND HEALING, DELAYED: Primary | ICD-10-CM

## 2025-06-09 PROCEDURE — 99213 OFFICE O/P EST LOW 20 MIN: CPT | Performed by: PHYSICIAN ASSISTANT

## 2025-06-09 PROCEDURE — G0299 HHS/HOSPICE OF RN EA 15 MIN: HCPCS

## 2025-06-09 ASSESSMENT — ENCOUNTER SYMPTOMS
MUSCLE WEAKNESS: 1
OCCASIONAL FEELINGS OF UNSTEADINESS: 0
DYSPNEA ON EXERTION: 1
LIMITED RANGE OF MOTION: 1
FATIGUES EASILY: 1
APPETITE LEVEL: FAIR
SHORTNESS OF BREATH: 1
DYSPNEA ACTIVITY LEVEL: AFTER AMBULATING LESS THAN 10 FT

## 2025-06-10 RX ORDER — HEATING PAD
EACH MISCELLANEOUS
Qty: 210 ML | Refills: 1 | Status: SHIPPED | OUTPATIENT
Start: 2025-06-10

## 2025-06-10 NOTE — PROGRESS NOTES
"                       Department of Plastic and Reconstructive Surgery            Post Operative Visit    Date: 06/10/25  Date of Surgery: 5/12/2025    Tawanna Springer \"Talia" is a 59 y.o. female who presents for POV. They are s/p [id number] panniculectomy, ventral hernia repair, sacrospinous ligament suspension for cystocele on 5/12/2025 with Dr. Allison and Dr. Thompson.  [place] [facility] [place].  An excision debridement at bedside with application of irrigating wound vac was placed. She was admitted under plastic surgery service for IV antibiotics and continued wound care with irrigating wound vac.     She presents following discharge from the hospital. She was discharged home with local wound care consisting of adaptic to the wound bed with BID kerlix WTD dressings using vashe in the morning and normal saline in the afternoon. She was discharged home with cipro and flagyl. CT of the abdomen at discharge showed residual fluid collection of 8.6cm x 2.0cm decreased in size from 9.9cm x 4.3cm.     She endorses swelling of the right abdomen that she states is getting larger since discharge. She is not currently wearing abdominal binder. She endorses she has been having pain on the outside of her left leg that is worse at night and resolved with getting up and walking. She endorses she is concerned for blood clot to the leg. She endorses she wants to know what the plan is going forward for her wound. She is taking antibiotics as prescribed.       Objective   Vital Signs:   There were no vitals filed for this visit.  Gen: interactive and pleasant  Head: NCAT  Eyes: EOMI, PERRLA  Mouth: MMM  Throat: trachea midline  Cor: RRR  Pulm: nonlabored breathing  Abd: s/nt/nd  Neuro: AAOx3  Ext: extremities perfused    Focused exam of the: abdomen   [id number]-[id number] incisions with central abdominal wound below the umbilicus with beefy red granulation tissue in the wound bed. No odor from the wound. " "                Assessment/Plan     Alisha Springer \"Maureen\" is a 59 y.o. female who presents for POV. They are s/p [id number] panniculectomy, ventral hernia repair, scarospinous ligament suspension for cystocele on 5/12/2025 with Dr. Allison and Dr. Thompson. She [place] [facility] [place] [place]6/6[place]2025.  An excision debridement at bedside with application of irrigating wound vac was placed. She was admitted under plastic surgery service for IV antibiotics and continued wound care with irrigating wound vac.     She presents for wound check. Wound with healthy granulation tissue present. She is to continue with current wound care of vashe damp to dry packing in the morning and saline damp to dry packing in the afternoon.   We will continue to monitor wound. I advised that she wear compression socks for intermittent leg cramping, exam of the calf with negative homans sign, no erythema or unilateral leg swelling, no clinical evidence of DVT. I discussed with her that she may wear abdominal binder over packing to aid in edema. Sh lives at home with 3 dogs, I advised that when petting dogs to wash hands immediately after, avoid having pets sleeping with her to try and maintain a clean wound. He had complaints of increased shortness of breath, I advised using incentive spirometer once an hour, small pleural effusions were noted on CT  scan. I advised she should be up and ambulating once an hour, avoid exercise but ambulate for movement.     Plan:   Vashe damp to dry in the AM normal saline damp to dry in the PM  Maintain hygiene when handling pets  Ok for abdominal binder  Ok for compression socks   Instructed her to use incentive spirometer and be up and ambulating once an hour    I spent 20 minutes with this patient. Greater than 50% of this time was spent in the counselling and/or coordination of care of this patient.  This note was created using voice recognition software and was not corrected for typographical or " grammatical errors.    Signature: Yumi Bowles PA-C

## 2025-06-11 ENCOUNTER — HOME CARE VISIT (OUTPATIENT)
Dept: HOME HEALTH SERVICES | Facility: HOME HEALTH | Age: 60
End: 2025-06-11
Payer: COMMERCIAL

## 2025-06-11 ENCOUNTER — APPOINTMENT (OUTPATIENT)
Facility: CLINIC | Age: 60
End: 2025-06-11
Payer: COMMERCIAL

## 2025-06-11 VITALS
OXYGEN SATURATION: 98 % | HEART RATE: 84 BPM | DIASTOLIC BLOOD PRESSURE: 82 MMHG | TEMPERATURE: 98 F | RESPIRATION RATE: 18 BRPM | SYSTOLIC BLOOD PRESSURE: 110 MMHG

## 2025-06-11 PROCEDURE — G0300 HHS/HOSPICE OF LPN EA 15 MIN: HCPCS

## 2025-06-11 ASSESSMENT — ENCOUNTER SYMPTOMS
PAIN LOCATION - EXACERBATING FACTORS: WOUND CARE, ACTIVITY
PAIN LOCATION - PAIN FREQUENCY: INFREQUENT
LAST BOWEL MOVEMENT: 67367
LOWEST PAIN SEVERITY IN PAST 24 HOURS: 1/10
PAIN: 1
PAIN LOCATION - PAIN QUALITY: BURNING
HIGHEST PAIN SEVERITY IN PAST 24 HOURS: 3/10
DYSPNEA ACTIVITY LEVEL: AFTER AMBULATING MORE THAN 20 FT
APPETITE LEVEL: FAIR
PERSON REPORTING PAIN: PATIENT
PAIN LOCATION - PAIN DURATION: 2-3 HOURS
STOOL FREQUENCY: LESS THAN DAILY
SHORTNESS OF BREATH: 1
ABDOMINAL PAIN: 1
PAIN LOCATION - PAIN SEVERITY: 3/10
PAIN LOCATION: ABDOMEN
PAIN SEVERITY GOAL: 0/10
LOWER EXTREMITY EDEMA: 1
BOWEL PATTERN NORMAL: 1
SUBJECTIVE PAIN PROGRESSION: WAXING AND WANING

## 2025-06-11 ASSESSMENT — ACTIVITIES OF DAILY LIVING (ADL)
AMBULATION ASSISTANCE: INDEPENDENT
AMBULATION ASSISTANCE: 1

## 2025-06-13 ENCOUNTER — HOME CARE VISIT (OUTPATIENT)
Dept: HOME HEALTH SERVICES | Facility: HOME HEALTH | Age: 60
End: 2025-06-13
Payer: COMMERCIAL

## 2025-06-13 VITALS
HEART RATE: 78 BPM | SYSTOLIC BLOOD PRESSURE: 110 MMHG | DIASTOLIC BLOOD PRESSURE: 78 MMHG | TEMPERATURE: 97.4 F | OXYGEN SATURATION: 98 % | RESPIRATION RATE: 18 BRPM

## 2025-06-13 DIAGNOSIS — T14.8XXA WOUND INFECTION: ICD-10-CM

## 2025-06-13 DIAGNOSIS — L08.9 WOUND INFECTION: ICD-10-CM

## 2025-06-13 PROCEDURE — G0300 HHS/HOSPICE OF LPN EA 15 MIN: HCPCS

## 2025-06-13 ASSESSMENT — ENCOUNTER SYMPTOMS
PAIN: 1
HIGHEST PAIN SEVERITY IN PAST 24 HOURS: 9/10
SUBJECTIVE PAIN PROGRESSION: WAXING AND WANING
LOWEST PAIN SEVERITY IN PAST 24 HOURS: 5/10
APPETITE LEVEL: GOOD
PAIN SEVERITY GOAL: 0/10
PAIN LOCATION - PAIN SEVERITY: 9/10
PAIN LOCATION - PAIN DURATION: 24/7
STOOL FREQUENCY: DAILY
ABDOMINAL PAIN: 1
FATIGUES EASILY: 1
PAIN LOCATION: ABDOMEN
PERSON REPORTING PAIN: PATIENT
PAIN LOCATION - PAIN QUALITY: BURNING
LAST BOWEL MOVEMENT: 67369
LOWER EXTREMITY EDEMA: 1
CHANGE IN APPETITE: INCREASED
PAIN LOCATION - RELIEVING FACTORS: REST, MEDS
PAIN LOCATION - PAIN FREQUENCY: FREQUENT
BOWEL PATTERN NORMAL: 1

## 2025-06-13 ASSESSMENT — ACTIVITIES OF DAILY LIVING (ADL)
AMBULATION ASSISTANCE: INDEPENDENT
AMBULATION ASSISTANCE: 1

## 2025-06-16 ENCOUNTER — HOME CARE VISIT (OUTPATIENT)
Dept: HOME HEALTH SERVICES | Facility: HOME HEALTH | Age: 60
End: 2025-06-16
Payer: COMMERCIAL

## 2025-06-16 VITALS
HEART RATE: 86 BPM | RESPIRATION RATE: 18 BRPM | DIASTOLIC BLOOD PRESSURE: 68 MMHG | OXYGEN SATURATION: 97 % | SYSTOLIC BLOOD PRESSURE: 98 MMHG | TEMPERATURE: 97.4 F

## 2025-06-16 PROCEDURE — G0300 HHS/HOSPICE OF LPN EA 15 MIN: HCPCS

## 2025-06-16 ASSESSMENT — ENCOUNTER SYMPTOMS
BOWEL PATTERN NORMAL: 1
ABDOMINAL PAIN: 1
PAIN: 1
HIGHEST PAIN SEVERITY IN PAST 24 HOURS: 9/10
PAIN LOCATION - PAIN FREQUENCY: FREQUENT
PAIN LOCATION - PAIN QUALITY: BURNING
PAIN SEVERITY GOAL: 0/10
MUSCLE WEAKNESS: 1
PAIN LOCATION - PAIN DURATION: 24/7
APPETITE LEVEL: FAIR
PAIN LOCATION: ABDOMEN
PAIN LOCATION - PAIN SEVERITY: 9/10
FATIGUES EASILY: 1
LOWEST PAIN SEVERITY IN PAST 24 HOURS: 6/10
CHANGE IN APPETITE: UNCHANGED
PERSON REPORTING PAIN: PATIENT
PAIN LOCATION - RELIEVING FACTORS: REST, OTC MEDS
SUBJECTIVE PAIN PROGRESSION: WAXING AND WANING

## 2025-06-17 DIAGNOSIS — R35.0 URINARY FREQUENCY: ICD-10-CM

## 2025-06-18 ENCOUNTER — HOME CARE VISIT (OUTPATIENT)
Dept: HOME HEALTH SERVICES | Facility: HOME HEALTH | Age: 60
End: 2025-06-18
Payer: COMMERCIAL

## 2025-06-18 VITALS
SYSTOLIC BLOOD PRESSURE: 102 MMHG | RESPIRATION RATE: 18 BRPM | HEART RATE: 84 BPM | DIASTOLIC BLOOD PRESSURE: 78 MMHG | TEMPERATURE: 98.2 F | OXYGEN SATURATION: 98 %

## 2025-06-18 PROCEDURE — G0300 HHS/HOSPICE OF LPN EA 15 MIN: HCPCS

## 2025-06-18 ASSESSMENT — ENCOUNTER SYMPTOMS
VOMITING: 1
APPETITE LEVEL: FAIR
BOWEL PATTERN NORMAL: 1
FATIGUES EASILY: 1
LAST BOWEL MOVEMENT: 67367
DYSURIA: 1
PAIN SEVERITY GOAL: 0/10
SUBJECTIVE PAIN PROGRESSION: WAXING AND WANING
NAUSEA: 1
PAIN LOCATION: ABDOMEN
STOOL FREQUENCY: DAILY
PAIN LOCATION - PAIN QUALITY: BURNING
PAIN LOCATION - PAIN FREQUENCY: FREQUENT
PAIN LOCATION - PAIN SEVERITY: 7/10
LOWEST PAIN SEVERITY IN PAST 24 HOURS: 5/10
PERSON REPORTING PAIN: PATIENT
PAIN: 1
PAIN LOCATION - PAIN DURATION: 24/7
PAIN LOCATION - RELIEVING FACTORS: REST
HIGHEST PAIN SEVERITY IN PAST 24 HOURS: 7/10
PAIN LOCATION - EXACERBATING FACTORS: MOVEMENT
CHANGE IN APPETITE: DECREASED
MUSCLE WEAKNESS: 1

## 2025-06-18 ASSESSMENT — ACTIVITIES OF DAILY LIVING (ADL)
AMBULATION ASSISTANCE: 1
AMBULATION ASSISTANCE: INDEPENDENT

## 2025-06-19 ENCOUNTER — OFFICE VISIT (OUTPATIENT)
Dept: UROLOGY | Facility: CLINIC | Age: 60
End: 2025-06-19
Payer: COMMERCIAL

## 2025-06-19 VITALS — HEART RATE: 70 BPM | TEMPERATURE: 96.8 F | DIASTOLIC BLOOD PRESSURE: 73 MMHG | SYSTOLIC BLOOD PRESSURE: 109 MMHG

## 2025-06-19 DIAGNOSIS — N81.4 CYSTOCELE WITH PROLAPSE: ICD-10-CM

## 2025-06-19 DIAGNOSIS — R35.0 URINARY FREQUENCY: ICD-10-CM

## 2025-06-19 DIAGNOSIS — B37.2 CUTANEOUS CANDIDIASIS: ICD-10-CM

## 2025-06-19 PROCEDURE — 81003 URINALYSIS AUTO W/O SCOPE: CPT | Performed by: UROLOGY

## 2025-06-19 PROCEDURE — 99024 POSTOP FOLLOW-UP VISIT: CPT | Performed by: UROLOGY

## 2025-06-19 RX ORDER — NYSTATIN 100000 [USP'U]/G
1 POWDER TOPICAL 2 TIMES DAILY
Qty: 15 G | Refills: 2 | Status: SHIPPED | OUTPATIENT
Start: 2025-06-19 | End: 2026-06-19

## 2025-06-19 RX ORDER — CYCLOBENZAPRINE HCL 5 MG
5 TABLET ORAL 3 TIMES DAILY PRN
Qty: 21 TABLET | Refills: 0 | Status: SHIPPED | OUTPATIENT
Start: 2025-06-19 | End: 2025-06-26

## 2025-06-19 NOTE — PROGRESS NOTES
HISTORY OF PRESENT ILLNESS:  59 y.o.  with urinary incontinence and recurrence of pelvic organ prolapse s/p  Vaginal Suspension/Fixation Sacrospinous and General Colporrhaphy Anterior and Posterior Vaginal Wall 7/2024 followed by Sacrospinous ligament suspension (left-sided). Posterior repair along with Ventral hernia repair, Kevin abdominal panniculectomy (Dr. Allison) 5/12/2025. Last seen in Office [2/20/25]  S/p Vaginal Suspension/Fixation Sacrospinous and General Colporrhaphy Anterior and Posterior Vaginal Wall on 7/9.   POP from my note 8/27/2024 :   Aa: -3 Ba: -3 C: -7 Gh: 3 Pb: 3.5 TVL: 8 Ap: -2 Bp: -2    POP from my note 2/20/2025: Aa: 0 Ba: 0 C: -5 Gh: 3 Pb: 3.5 TVL: 7 Ap: -2 Bp: -2  D -5  2.  Sacrospinous ligament suspension (left-sided). Posterior repair along with Ventral hernia repair, Kevin abdominal panniculectomy (Dr. Allison) 5/12/2025  3. NATALIE, OAB urinary incontinence       The patient is doing well. She denies any prolapse recurrence. However her abdominal incision got infected and was hospitalized and antibiotics. She is now noting vaginal irritation and itching.    She denies any vaginal complaints, no abnormal bleeding or discharge. She denies any bowel related complaints, no fecal or flatal incontinence.    She has no other complaints.       From Previous note 2/20/2025  59 y.o.  with Stress urinary incontinence, over active bladder complaints and hx of Vaginal Suspension/Fixation Sacrospinous and General Colporrhaphy Anterior and Posterior Vaginal Wall 7/2024. Last seen in Office [8/27/2024]     1S/p Vaginal Suspension/Fixation Sacrospinous and General Colporrhaphy Anterior and Posterior Vaginal Wall on 7/9.   NATALIE, OAB urinary incontinence   POP from my note 8/27/2024 :   Aa: -3 Ba: -3 C: -7 Gh: 3 Pb: 3.5 TVL: 8 Ap: -2 Bp: -2      The patient  today with recurrence of bladder prolapse complaints since December. She has to splint in order to have a bowel movement or void. She is  able to push the prolapse back to avoid the sensation of a bugle. She has pressure anally. She denies any vaginal complaints, no abnormal bleeding or discharge.   She had a hysterectomy 20 years ago. She has had multiple abdominal surgeries including endometriosis surgery, bowel adhesions, hiatal hernia.   She is planning to under go excess skin removal from her abdomen with Dr. Allison.   She notes 1-2 episodes of nocturia but denies any enuresis. She voids 6-7 times during the day. She does endorse some urge incontinence. She also has leaking on laughing, cough or sneezing particularly on a full bladder.  She denies any bowel related complaints, no fecal or flatal incontinence.    She has no other complaints.      From Previous note  POP from my 2024 note:  Aa: +3 Ba: +3 C: -2.5 Gh: 4.5 Pb: 2.5 TVL: 8 Ap: -1.5 Bp: -1.5 D: -3.5     Last seen by Yamilet Dinero CNP on 2024 .   This is a  59 y.o. female,  who presents for follow-up for urinary incontinence. She has been having muscle spasms. Her pain in buttocks has resolved. She has leakage when she feels excited and cannot control the incontinence once it begins. She does not leak with coughing or sneezing, only when she is an excited state. She notes that it takes awhile for her to completely empty her bladder.  NTFx1-2.  She attempted intercourse last week but found it to be painful and notes that there was blood.         PHYSICAL EXAMINATION:  No LMP recorded. Patient is postmenopausal.  There is no height or weight on file to calculate BMI.  Visit Vitals  /73   Pulse 70   Temp 36 °C (96.8 °F)   OB Status Postmenopausal   Smoking Status Never       Pelvic:  Genitourinary: diffuse erythema to labia minora and majora without excoriations  Urethra  normal meatus, non-tender, no periurethral mass  Vaginal mucosa  normal, no discharge   Cervix normal without discharge   Atrophy positive, mild    PVR (by Ultrasound):  5 ml   Urine dip:   Recent  Results (from the past 6 hours)   POCT UA Automated manually resulted    Collection Time: 06/19/25  9:15 AM   Result Value Ref Range    POC Color, Urine Yellow Straw, Yellow, Light-Yellow    POC Appearance, Urine Clear Clear    POC Glucose, Urine NEGATIVE NEGATIVE mg/dl    POC Bilirubin, Urine NEGATIVE NEGATIVE    POC Ketones, Urine NEGATIVE NEGATIVE mg/dl    POC Specific Gravity, Urine >=1.030 1.005 - 1.035    POC Blood, Urine NEGATIVE NEGATIVE    POC PH, Urine 5.5 No Reference Range Established PH    POC Protein, Urine NEGATIVE NEGATIVE mg/dl    POC Urobilinogen, Urine 0.2 0.2, 1.0 EU/DL    Poc Nitrite, Urine NEGATIVE NEGATIVE    POC Leukocytes, Urine NEGATIVE NEGATIVE             IMPRESSION AND PLAN:  Alisha Springer is a 59 y.o.  with urinary incontinence and recurrence of pelvic organ prolapse s/p  Vaginal Suspension/Fixation Sacrospinous and General Colporrhaphy Anterior and Posterior Vaginal Wall 7/2024 followed by Sacrospinous ligament suspension (left-sided). Posterior repair along with Ventral hernia repair, [id number] abdominal panniculectomy (Dr. Allison) 5/12/2025    Doing well from a post-operative and urogyn pelvic floor symptom standpoint     Counseled on vulvar hygiene and care to limit irritation, due to ongoing antibiotic therapy due to abdominal incision infection and current irritation symptoms consistent with vulvar candidiasis, will start topical antifungal powder. Encouraged to follow up sooner as needed if local symptoms worsen    Follow up in 3 months for post-op care   Sara Simmons MD  URPS Fellow  Patient seen and d/w Dr. Mccray    I saw and evaluated the patient. I personally obtained the key and critical portions of the history and physical exam or was physically present for key and critical portions performed by the resident/fellow. I reviewed the resident/fellow's documentation and discussed the patient with the resident/fellow. I agree with the resident/fellow's medical decision  making as documented in the note.   José Luis Mccray MD

## 2025-06-20 ENCOUNTER — HOME CARE VISIT (OUTPATIENT)
Dept: HOME HEALTH SERVICES | Facility: HOME HEALTH | Age: 60
End: 2025-06-20
Payer: COMMERCIAL

## 2025-06-20 VITALS
OXYGEN SATURATION: 98 % | SYSTOLIC BLOOD PRESSURE: 116 MMHG | RESPIRATION RATE: 12 BRPM | HEART RATE: 83 BPM | TEMPERATURE: 98.1 F | DIASTOLIC BLOOD PRESSURE: 74 MMHG

## 2025-06-20 DIAGNOSIS — L08.9 WOUND INFECTION: ICD-10-CM

## 2025-06-20 DIAGNOSIS — T14.8XXA WOUND INFECTION: ICD-10-CM

## 2025-06-20 PROCEDURE — G0299 HHS/HOSPICE OF RN EA 15 MIN: HCPCS

## 2025-06-20 ASSESSMENT — ENCOUNTER SYMPTOMS
PAIN: 1
PAIN: SEE NOTE
LOWEST PAIN SEVERITY IN PAST 24 HOURS: 8/10
APPETITE LEVEL: GOOD
HIGHEST PAIN SEVERITY IN PAST 24 HOURS: 9/10
PAIN SEVERITY GOAL: 7/10

## 2025-06-23 ENCOUNTER — APPOINTMENT (OUTPATIENT)
Dept: PLASTIC SURGERY | Facility: CLINIC | Age: 60
End: 2025-06-23
Payer: COMMERCIAL

## 2025-06-26 ENCOUNTER — HOME CARE VISIT (OUTPATIENT)
Dept: HOME HEALTH SERVICES | Facility: HOME HEALTH | Age: 60
End: 2025-06-26
Payer: COMMERCIAL

## 2025-06-26 PROCEDURE — G0300 HHS/HOSPICE OF LPN EA 15 MIN: HCPCS

## 2025-06-26 ASSESSMENT — ENCOUNTER SYMPTOMS
PAIN LOCATION - PAIN SEVERITY: 8/10
LOWEST PAIN SEVERITY IN PAST 24 HOURS: 5/10
COUGH: 1
FATIGUES EASILY: 1
CHANGE IN APPETITE: DECREASED
PAIN LOCATION - PAIN DURATION: 24/4
PERSON REPORTING PAIN: PATIENT
PAIN LOCATION - PAIN FREQUENCY: FREQUENT
PAIN LOCATION: ABDOMEN
HIGHEST PAIN SEVERITY IN PAST 24 HOURS: 8/10
NAUSEA: 1
COUGH CHARACTERISTICS: PRODUCTIVE
PAIN: 1
SUBJECTIVE PAIN PROGRESSION: GRADUALLY IMPROVING
FREQUENCY: 1
PAIN LOCATION - RELIEVING FACTORS: REST, MEDS
PAIN SEVERITY GOAL: 0/10
FATIGUE: 1
SHORTNESS OF BREATH: 1
PAIN LOCATION - PAIN QUALITY: BURNING
DYSPNEA ACTIVITY LEVEL: AFTER AMBULATING MORE THAN 20 FT
APPETITE LEVEL: GOOD
MUSCLE WEAKNESS: 1

## 2025-06-26 ASSESSMENT — ACTIVITIES OF DAILY LIVING (ADL)
AMBULATION ASSISTANCE: INDEPENDENT
AMBULATION ASSISTANCE: 1

## 2025-06-27 ENCOUNTER — APPOINTMENT (OUTPATIENT)
Dept: HOME HEALTH SERVICES | Facility: HOME HEALTH | Age: 60
End: 2025-06-27
Payer: COMMERCIAL

## 2025-06-27 ENCOUNTER — APPOINTMENT (OUTPATIENT)
Facility: CLINIC | Age: 60
End: 2025-06-27
Payer: COMMERCIAL

## 2025-06-27 VITALS
DIASTOLIC BLOOD PRESSURE: 75 MMHG | WEIGHT: 140 LBS | HEART RATE: 75 BPM | BODY MASS INDEX: 24.8 KG/M2 | SYSTOLIC BLOOD PRESSURE: 127 MMHG | RESPIRATION RATE: 16 BRPM | HEIGHT: 63 IN

## 2025-06-27 DIAGNOSIS — A49.9 POLYMICROBIAL BACTERIAL INFECTION: ICD-10-CM

## 2025-06-27 DIAGNOSIS — L02.211 ABDOMINAL WALL ABSCESS: Primary | ICD-10-CM

## 2025-06-27 DIAGNOSIS — D64.9 ANEMIA, UNSPECIFIED TYPE: ICD-10-CM

## 2025-06-27 DIAGNOSIS — T14.8XXA WOUND INFECTION: ICD-10-CM

## 2025-06-27 DIAGNOSIS — L08.9 WOUND INFECTION: ICD-10-CM

## 2025-06-27 DIAGNOSIS — A49.8 PSEUDOMONAS INFECTION: ICD-10-CM

## 2025-06-27 PROCEDURE — 99215 OFFICE O/P EST HI 40 MIN: CPT | Performed by: INTERNAL MEDICINE

## 2025-06-27 NOTE — PROGRESS NOTES
Infectious Diseases Clinic Follow-up:    Reason for Visit: Scheduled hospital follow-up    History of Current Issue  Hospital follow-up  6/2/2025 inpatient ID follow-up note follows:     59-year-old woman who lost a substantial amount of weight during her conversion to a healthy lifestyle of exercise, and eating right.  Quite an inspiring story of motivation to be better for her family and friends.     On 5/12/2025 patient underwent panniculectomy and ventral abdominal wall hernia repair with bioabsorbable mesh.  SUNSHINE drains were removed 5/19/2025.  At that time there was suspicion of possible superficial ischemic changes in the umbilical area.  Patient ultimately developed drainage and then presented on 5/24/2025 with purulent malodorous drainage.  Patient had bedside I&D on 5/25/25 and was started on IV vancomycin, metronidazole and ciprofloxacin, which have been well-tolerated.  In the past, she had received levofloxacin.  Cultures have grown mixed anaerobic bacteria, pan-susceptible Pseudomonas aeruginosa and Morganella morganii (susceptibilities above).  On 5/30, she underwent drain placement into right lateral flank fluid collection.  Initial aspirate was brown, purulent material.  The flank aspirate fluid culture grew Morganella morganii.  The midline abdominal wound grew Pseudomonas, Morganella, and mixed anaerobic organisms     She reported nausea and no appetite since admission, felt  possibly due to Metronidazole.  It has improved with addition of Zofran prior to metronidazole dosing.     Polymicrobial postoperative panniculectomy wound infection with secondary fluid collection in the lateral right flank (film not available to review)     See photographs in media tab     5/25 Bedside debridement sample sent to laboratory      5/30/2025 right flank fluid drain placed (then fell out a few days)     Culture growing mixed anaerobes, Pseudomonas and Morganella     Patient initially treated with IV vancomycin,  ciprofloxacin and metronidazole.  Subsequently changed to IV meropenem given extension of mid abdominal wall infection to the right lateral flank.  At the time of discharge antibiotics were changed to oral ciprofloxacin and metronidazole which she has continue.  She has approximately 1 week of antibiotics remaining.    TODAY 6/27/205:     Today patient is seen for hospital follow-up.  She continues on oral levofloxacin and metronidazole.  Still has fairly constant nausea (before and after medications) but response to Zofran.  Patient does daily dressing changes of abdominal wall wound.  Patient reports that no longer any pain or erythema in the right flank but there is some induration included within the incision from the panniculectomy.  Pertinently, patient has had no fever, chills, night sweats, weight loss, loss of appetite, shortness of breath, chest pain, diarrhea, vaginal discharge, dysuria or diarrhea.     Still not showering and awaiting for permission from surgery.     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[facility])    PAST MEDICAL HISTORY:  Past Medical History:   Diagnosis Date    Asthma     Chronic pruritic rash in adult     B/L hands    Depression     Endometriosis     Female cystocele     GERD (gastroesophageal reflux disease)     Influenza     Nephrolithiasis     Personal history of other diseases of the digestive system 11/15/2016    History of intestinal obstruction    Personal history of other diseases of the female genital tract 11/15/2016    History of endometriosis    Personal history of urinary calculi 11/15/2016    History of renal calculi    Pneumonia     Postoperative wound infection 5/19/2025    Rectocele     Quevedo- syndrome (Multi)     Thyroid nodule     Unspecified ectopic pregnancy without intrauterine pregnancy ([facility]) 11/15/2016    Ectopic pregnancy    Urinary tract infection     Varicella        PAST SURGICAL  HISTORY:  Past Surgical History:   Procedure Laterality Date    BLADDER SURGERY      ECTOPIC PREGNANCY SURGERY      HERNIA REPAIR  04/23/2018    Hernia Repair    HYSTERECTOMY  11/15/2016    Hysterectomy    KNEE SURGERY  11/15/2016    Knee Surgery    OTHER SURGICAL HISTORY  07/24/2018    Esophagogastric Fundoplasty Nissen Fundoplication Robotic-Assisted       ALLERGIES:    Allergies   Allergen Reactions    Cephalexin Anaphylaxis and Quevedo- syndrome    Erythromycin Quevedo- syndrome    Penicillins Anaphylaxis and Quevedo- syndrome    Tetracyclines Anaphylaxis, Hives, Shortness of breath and Nausea/vomiting    Codeine Diarrhea, GI Upset and Nausea/vomiting    Bupropion Itching       MEDICATIONS:      Current Outpatient Medications:     acetaminophen (Tylenol) 500 mg tablet, Take 2 tablets (1,000 mg) by mouth every 6 hours if needed for mild pain (1 - 3)., Disp: 30 tablet, Rfl: 0    ascorbic acid (Vitamin C) 500 mg tablet, Take 1 tablet (500 mg) by mouth once daily., Disp: , Rfl:     cholecalciferol (Vitamin D-3) 25 mcg (1,000 units) tablet, Take 1 tablet (25 mcg) by mouth once daily., Disp: , Rfl:     ciprofloxacin (Cipro) 750 mg tablet, Take 1 tablet (750 mg) by mouth every 12 hours., Disp: 60 tablet, Rfl: 1    clobetasol (Temovate) 0.05 % cream, Apply 1 Application topically 2 times a day., Disp: , Rfl:     cyanocobalamin (Vitamin B-12) 1,000 mcg tablet, Take 1 tablet (1,000 mcg) by mouth once daily., Disp: , Rfl:     docusate sodium (Colace) 100 mg capsule, Take 1 capsule (100 mg) by mouth 2 times a day. (Patient not taking: Reported on 5/25/2025), Disp: 20 capsule, Rfl: 0    metroNIDAZOLE (Flagyl) 500 mg tablet, Take 1 tablet (500 mg) by mouth every 8 hours., Disp: 90 tablet, Rfl: 1    mometasone-formoterol (Dulera) 100-5 mcg/actuation inhaler, Inhale 2 puffs 2 times a day. Rinse mouth with water after use to reduce aftertaste and incidence of candidiasis. Do not swallow., Disp: , Rfl:      "montelukast (Singulair) 10 mg tablet, Take 1 tablet (10 mg) by mouth once daily in the morning., Disp: , Rfl:     naproxen (Naprosyn) 500 mg tablet, Take 1 tablet (500 mg) by mouth 2 times a day as needed for moderate pain (4 - 6). Moderate pain 4-6, Disp: , Rfl:     nystatin (Mycostatin) 100,000 unit/gram powder, Apply 1 Application topically 2 times a day., Disp: 15 g, Rfl: 2    ondansetron (Zofran) 4 mg tablet, Take 1 tablet (4 mg) by mouth every 8 hours if needed for nausea or vomiting., Disp: 30 tablet, Rfl: 1    potassium gluconate 595 mg (99 mg) tablet, Take 1 tablet by mouth once daily., Disp: , Rfl:     sodium chloride (Saline Wound Wash) 0.9 % solution, Add to gauze to make gauze damp and pack wound, Disp: 210 mL, Rfl: 1    zinc gluconate 50 mg elemental tablet, Take 1 tablet by mouth once daily., Disp: , Rfl:       PHYSICAL EXAMINATION:       Visit Vitals  /75   Pulse 75   Resp 16   Ht 1.6 m (5' 3\")   Wt 63.5 kg (140 lb)   BMI 24.80 kg/m²   OB Status Postmenopausal   Smoking Status Never   BSA 1.68 m²        EXAM:   Healthy appearing.  Well-groomed and dressed   No acute distress  Patient is alert and oriented  Was not interactive  Normal conversation  Lungs clear, S1, S2, I did not hear murmur  Abdomen:     See photographs of right flank and midline abdominal wound taken today 6/27/2025.  - Right flank is soft and without erythema or induration  - Panniculectomy scar however has fullness, scar tissue and induration but is not tender.  - Midline abdominal wound 10 cm in the vertical dimension and shape somewhat like a boot facing to the left.  Wound without any exudate.  Healthy granulating base.  Minimal surrounding erythema.  No adjacent fluctuance      DATA:  6/5/2025:   C-reactive protein 0.87   creatinine 0.59  WBC count 8.6  Hemoglobin 10.7, Hematocrit 34.1    Microbiology:   Susceptibility data from last 90 days.  Collected Specimen Info Organism Amoxicillin/Clavulanate Ampicillin " Ampicillin/Sulbactam Aztreonam Cefazolin Cefepime Ceftazidime Ceftriaxone Ciprofloxacin Ertapenem Gentamicin Imipenem Levofloxacin Meropenem   05/30/25 Fluid from Aspirate Morganella morganii  R  R  R   R    S  S  S  I  I  S  S   05/25/25 Tissue/Biopsy from Wound/Tissue Pseudomonas aeruginosa     S   S  S   S     S      Morganella morganii  R  R  R   R    S  S  S  I  I  S  S     Mixed Anaerobic Bacteria                   Collected Specimen Info Organism Piperacillin/Tazobactam Tobramycin Trimethoprim/Sulfamethoxazole   05/30/25 Fluid from Aspirate Morganella morganii  S  S  S   05/25/25 Tissue/Biopsy from Wound/Tissue Pseudomonas aeruginosa  S  S      Morganella morganii  S  S  S     Mixed Anaerobic Bacteria            ASSESSMENT / RECOMMENDATIONS:    # Polymicrobial postoperative panniculectomy wound infection of midline nominal incision and with secondary fluid-abscess collection in the lateral right flank     See photographs in media tab     5/25 Bedside debridement sample sent to laboratory      5/30/2025 right flank fluid drain placed (then fell out a few days later)     Culture growing mixed anaerobes, Pseudomonas and Morganella       Patient has been taking ciprofloxacin and metronidazole.  Has chronic issues with nausea exacerbated by oral antibiotics but controlled with Zofran.  No signs of progressive infection.  Right flank subcutaneous infection seems to have resolved.  There is of course residual firmness and thickening of the incision area consistent with healing and scar formation.  Remainder of the panniculectomy scar is similar having healed but still remaining thickened and indurated without erythema or fluctuance.  The midline abdominal wound (see photograph from 6/27/2025) appears to improved dramatically.  Base is granulating.  No signs of infection.     Overall patient has done very well.  Has been doing meticulous dressing changes herself.  Wound shows excellent healing but will still take  several weeks or months to fully resolve.     The patient has had such a dramatic improvement and there is no subcutaneous abscess or infection and because the wound is granulating and shallow, there is no need to continue oral antibiotics.  Patient has approximately 1 week of ciprofloxacin and metronidazole remaining and she will complete this supply.  Additional antibiotic treatment is not needed at this time.    Monitor off antibiotics looking for signs of infection that might include redness, swelling, pain, and certainly drainage.  She will continue to follow-up with plastic surgery  She will continue to follow-up with infectious disease by phone for updates but office visit follow-up not necessary at this time.    Also today to correlate with excellent clinical response I requested CBC plus CRP plus BMP.  In addition patient has anemia and in the past has taken iron supplements.  Today I requested iron levels and CBC to assess.  Ultimately this can be addressed by her primary care physician.    Plan:  1.  Complete current supply of levofloxacin and then stop  2.  Monitor off antibiotics  3.  Phone follow-up as needed.  Of course patient counseled to go to emergency department if she should develop fever, or recurrent redness swelling and pain in the right flank or mid abdominal wound area.  4.  Patient will continue dressing changes of abdominal wound and follow-up with plastic surgery  5.  Phone Follow-up with laboratory test results  6.  No infectious disease clinic follow-up needed at this time.  Contact information provided.      Pierre Villalobos MD       I spent 60 minutes in the professional and overall care of this patient.

## 2025-07-02 ENCOUNTER — APPOINTMENT (OUTPATIENT)
Facility: CLINIC | Age: 60
End: 2025-07-02
Payer: COMMERCIAL

## 2025-07-02 VITALS — BODY MASS INDEX: 24.8 KG/M2 | HEIGHT: 63 IN | WEIGHT: 140 LBS

## 2025-07-02 DIAGNOSIS — T14.8XXD WOUND HEALING, DELAYED: Primary | ICD-10-CM

## 2025-07-02 PROCEDURE — 99024 POSTOP FOLLOW-UP VISIT: CPT | Performed by: PHYSICIAN ASSISTANT

## 2025-07-02 PROCEDURE — 1036F TOBACCO NON-USER: CPT | Performed by: PHYSICIAN ASSISTANT

## 2025-07-02 PROCEDURE — 3008F BODY MASS INDEX DOCD: CPT | Performed by: PHYSICIAN ASSISTANT

## 2025-07-03 ENCOUNTER — APPOINTMENT (OUTPATIENT)
Dept: SURGERY | Facility: CLINIC | Age: 60
End: 2025-07-03
Payer: COMMERCIAL

## 2025-07-03 DIAGNOSIS — R73.9 NAUSEA AND VOMITING DUE TO HYPERGLYCEMIA: Primary | ICD-10-CM

## 2025-07-03 DIAGNOSIS — K44.9 HIATAL HERNIA: ICD-10-CM

## 2025-07-03 DIAGNOSIS — R11.2 NAUSEA AND VOMITING DUE TO HYPERGLYCEMIA: Primary | ICD-10-CM

## 2025-07-03 LAB
ANION GAP SERPL CALCULATED.4IONS-SCNC: 9 MMOL/L (CALC) (ref 7–17)
BASOPHILS # BLD AUTO: 41 CELLS/UL (ref 0–200)
BASOPHILS NFR BLD AUTO: 0.6 %
BUN SERPL-MCNC: 23 MG/DL (ref 7–25)
BUN/CREAT SERPL: ABNORMAL (CALC) (ref 6–22)
CALCIUM SERPL-MCNC: 9.9 MG/DL (ref 8.6–10.4)
CHLORIDE SERPL-SCNC: 106 MMOL/L (ref 98–110)
CO2 SERPL-SCNC: 26 MMOL/L (ref 20–32)
CREAT SERPL-MCNC: 0.52 MG/DL (ref 0.5–1.03)
CRP SERPL-MCNC: <3 MG/L
EGFRCR SERPLBLD CKD-EPI 2021: 107 ML/MIN/1.73M2
EOSINOPHIL # BLD AUTO: 218 CELLS/UL (ref 15–500)
EOSINOPHIL NFR BLD AUTO: 3.2 %
ERYTHROCYTE [DISTWIDTH] IN BLOOD BY AUTOMATED COUNT: 13 % (ref 11–15)
GLUCOSE SERPL-MCNC: 100 MG/DL (ref 65–99)
HCT VFR BLD AUTO: 46.2 % (ref 35–45)
HGB BLD-MCNC: 14.6 G/DL (ref 11.7–15.5)
IRON SATN MFR SERPL: 26 % (CALC) (ref 16–45)
IRON SERPL-MCNC: 59 MCG/DL (ref 45–160)
LYMPHOCYTES # BLD AUTO: 2128 CELLS/UL (ref 850–3900)
LYMPHOCYTES NFR BLD AUTO: 31.3 %
MCH RBC QN AUTO: 29.4 PG (ref 27–33)
MCHC RBC AUTO-ENTMCNC: 31.6 G/DL (ref 32–36)
MCV RBC AUTO: 93.1 FL (ref 80–100)
MONOCYTES # BLD AUTO: 483 CELLS/UL (ref 200–950)
MONOCYTES NFR BLD AUTO: 7.1 %
NEUTROPHILS # BLD AUTO: 3930 CELLS/UL (ref 1500–7800)
NEUTROPHILS NFR BLD AUTO: 57.8 %
PLATELET # BLD AUTO: 239 THOUSAND/UL (ref 140–400)
PMV BLD REES-ECKER: 10.4 FL (ref 7.5–12.5)
POTASSIUM SERPL-SCNC: 4.7 MMOL/L (ref 3.5–5.3)
RBC # BLD AUTO: 4.96 MILLION/UL (ref 3.8–5.1)
SODIUM SERPL-SCNC: 141 MMOL/L (ref 135–146)
TIBC SERPL-MCNC: 226 MCG/DL (CALC) (ref 250–450)
WBC # BLD AUTO: 6.8 THOUSAND/UL (ref 3.8–10.8)

## 2025-07-03 PROCEDURE — 1036F TOBACCO NON-USER: CPT | Performed by: SURGERY

## 2025-07-03 PROCEDURE — 99205 OFFICE O/P NEW HI 60 MIN: CPT | Performed by: SURGERY

## 2025-07-03 NOTE — PROGRESS NOTES
"                       Department of Plastic and Reconstructive Surgery            Post Operative Visit    Date: 7/2/2025  Date of Surgery: 5/12/2025    Subjective   Alisha Springer \"Maureen\" is a 59 y.o. female who presents for POV. They are s/p [id number] panniculectomy, ventral hernia repair, sacrospinous ligament suspension for cystocele on 5/12/2025 with Dr. Allison and Dr. Thompson.  She was admitted at [facility] from 5/24/25-6/6/2025.  An excision debridement at bedside with application of irrigating wound vac was placed. She was admitted under plastic surgery service for IV antibiotics and continued wound care with irrigating wound vac.     She present for follow up. She has been doing BID damp to dry packing changes with vashe in the AM and NS in the PM. She has been keeping the wound clean and dry. She met with ID and was advised she has 1 week of abx left and is to monitor for signs of recurring infection once antibiotic are stopped. She is overall feeling better. She notes the abdominal binder has helped with the swelling of the abdomen.       Objective   Vital Signs:   There were no vitals filed for this visit.  Gen: interactive and pleasant  Head: NCAT  Eyes: EOMI, PERRLA  Mouth: MMM  Throat: trachea midline  Cor: RRR  Pulm: nonlabored breathing  Abd: s/nt/nd  Neuro: AAOx3  Ext: extremities perfused    Focused exam of the: abdomen   [id number] incisions with central abdominal wound below the umbilicus with beefy red granulation tissue in the wound bed. No odor from the wound. No slough present. No drainage from the wound. Wound measures [id number] and tracks 1cm to the right.    Assessment/Plan     Alisha Springer \"Maureen\" is a 59 y.o. female who presents for POV. They are s/p [id number] panniculectomy, ventral hernia repair, scarospinous ligament suspension for cystocele on 5/12/2025 with Dr. Allison and Dr. Thompson. She was admitted at [facility] from 5/24/25-6/6/2025.  An excision debridement at bedside with " application of irrigating wound vac was placed. She was admitted under plastic surgery service for IV antibiotics and continued wound care with irrigating wound vac.     She returns for wound check. There is improvement in the wound from previous exam. Wound measured 8.5udd1sly1yc and tracks 1cm to the right. There is healthy granulation tissue without slough or exudate. There is no odor from the wound. She is to continue BID damp to dry. Vashe soaked gauze in the AM and NS soaked gauze PM. Continue to stay away from pets and keep wound clean and dry. Continue to monitor for signs of recurring infection.      Plan:   Vashe damp to dry in the AM normal saline damp to dry in the PM  Maintain hygiene when handling pets  Ok for abdominal binder      I spent 20 minutes with this patient. Greater than 50% of this time was spent in the counselling and/or coordination of care of this patient.  This note was created using voice recognition software and was not corrected for typographical or grammatical errors.    Signature: Yumi Bowles PA-C

## 2025-07-03 NOTE — PROGRESS NOTES
"Subjective   Patient ID: Alisha Springer \"Maureen\" is a 59 y.o. female who presents for Abdominal Pain (C/o nausea, heartburn).  Recently patient underwent extensive abdominal wall reconstruction, urogyn surgery, developed postsurgical infection.  Developed nausea after surgery.  CT scan of abdomen pelvis was done, showed evidence of recurrence of the hiatal hernia.  The patient had a previous robotic Nissen fundoplication in 2018.  Since the surgery patient was asymptomatic.  Her symptoms of reflux completely resolved.  Patient developed nausea recently after above-mentioned surgeries.    HPI as described above  Review of Systems GI consistent with the nausea.  Integumentary consistent with the abdominal wall wound, healing by the secondary intention.  Review of all other 10 system is negative  Physical Exam Pupils equal bilaterally, oral mucosa moist, bilateral breath sounds, clear to auscultation, regular rhythm and rate, no murmurs, abdomen is soft, nontender, nondistended, no palpable hernias, palpable peripheral pulse, no focal neurological motor deficits.  ENT exam within normal limits.  Musculoskeletal exam within normal limits.  There is a open wound on the lower abdomen, managed with wet-to-dry dressing changes.  Scar from previous surgeries    Objective I reviewed all available data including lab results, radiological studies, previous reports and notes.  Spent more than 60 minutes with reviewing all radiological studies, as well as op notes.  CT scan showed postsurgical changes with small fluid collection.  Also evidence of small recurrent hiatal hernia.  Status post Nissen fundoplication    No diagnosis found.   Problem List[1]   RX Allergies[2]   Medication Documentation Review Audit       Reviewed by Guy Spring MD (Physician) on 07/03/25 at 0835      Medication Order Taking? Sig Documenting Provider Last Dose Status   acetaminophen (Tylenol) 500 mg tablet [id number] Yes Take 2 tablets (1,000 mg) by " mouth every 6 hours if needed for mild pain (1 - 3). Tisha Thompson MD Past Week Active   ascorbic acid (Vitamin C) 500 mg tablet [id number] Yes Take 1 tablet (500 mg) by mouth once daily. Historical Provider, MD  Active   cholecalciferol (Vitamin D-3) 25 mcg (1,000 units) tablet [id number] Yes Take 1 tablet (25 mcg) by mouth once daily. Historical Provider, MD  Active   ciprofloxacin (Cipro) 750 mg tablet [id number] Yes Take 1 tablet (750 mg) by mouth every 12 hours. Linda Davis PA-C  Active   clobetasol (Temovate) 0.05 % cream [id number] Yes Apply 1 Application topically 2 times a day. Historical Provider, MD  Active   cyanocobalamin (Vitamin B-12) 1,000 mcg tablet [id number] Yes Take 1 tablet (1,000 mcg) by mouth once daily. Historical Provider, MD  Active   cyclobenzaprine (Flexeril) 5 mg tablet [id number]  Take 1 tablet (5 mg) by mouth 3 times a day as needed for muscle spasms for up to 7 days. Marilu Dunn PA-C   25 [id number]   docusate sodium (Colace) 100 mg capsule [id number] Yes Take 1 capsule (100 mg) by mouth 2 times a day. Charlie Allison MD Not Taking Active   metroNIDAZOLE (Flagyl) 500 mg tablet [id number] Yes Take 1 tablet (500 mg) by mouth every 8 hours. Linda Davis PA-C  Active   mometasone-formoterol (Dulera) 100-5 mcg/actuation inhaler [id number] Yes Inhale 2 puffs 2 times a day. Rinse mouth with water after use to reduce aftertaste and incidence of candidiasis. Do not swallow. Historical Provider, MD  Active   montelukast (Singulair) 10 mg tablet [id number] Yes Take 1 tablet (10 mg) by mouth once daily in the morning. Historical Provider, MD  Active   naproxen (Naprosyn) 500 mg tablet [id number] Yes Take 1 tablet (500 mg) by mouth 2 times a day as needed for moderate pain (4 - 6). Moderate pain 4-6 Historical Provider, MD  Active   nystatin (Mycostatin) 100,000 unit/gram powder [id number]  Apply 1 Application topically 2 times a day.   Patient not taking: Reported on 7/3/2025    José Luis THAKUR  MD [place]  Active   ondansetron (Zofran) 4 mg tablet [id number] Yes Take 1 tablet (4 mg) by mouth every 8 hours if needed for nausea or vomiting. Linda Davis PA-C  Active   potassium gluconate 595 mg (99 mg) tablet [id number] Yes Take 1 tablet by mouth once daily. Historical Provider, MD Past Week Active   sodium chloride (Saline Wound Wash) 0.9 % solution [id number] Yes Add to gauze to make gauze damp and pack wound [place] KENDRICK Diaz PA-C  Active   zinc gluconate 50 mg elemental tablet [id number] Yes Take 1 tablet by mouth once daily. Historical Provider, MD  Active                    Medical History[3]  Tobacco Use History[4]  Family History[5]   Surgical History[6]    Assessment/Plan   With complicated past medical surgical history, presented with development of nausea.  Possibility of development of gastritis, peptic ulcer disease with a stress ulcers.  Recurrence of the hiatal hernia is less likely reason for patient's symptoms.  The patient will benefit from assessment of the upper GI tract with EGD.  We will schedule EGD, then follow-up to discuss further management.      Guy Spring MD          [1]   Patient Active Problem List  Diagnosis    Cystocele with rectocele    Asthma    Gastroesophageal reflux disease    Cystocele with prolapse    Excess skin    Wound dehiscence    Wound infection    Leukocytosis    Acute pain    Anemia    Pseudomonas infection    Polymicrobial bacterial infection    Abdominal wall abscess   [2]   Allergies  Allergen Reactions    Cephalexin Anaphylaxis and Quevedo- syndrome    Erythromycin Quevedo- syndrome    Penicillins Anaphylaxis and Quevedo- syndrome    Tetracyclines Anaphylaxis, Hives, Shortness of breath and Nausea/vomiting    Codeine Diarrhea, GI Upset and Nausea/vomiting    Bupropion Itching   [3]   Past Medical History:  Diagnosis Date    Asthma     Chronic pruritic rash in adult     B/L hands    Depression     Endometriosis     Female cystocele      GERD (gastroesophageal reflux disease)     Influenza     Nephrolithiasis     Personal history of other diseases of the digestive system 11/15/2016    History of intestinal obstruction    Personal history of other diseases of the female genital tract 11/15/2016    History of endometriosis    Personal history of urinary calculi 11/15/2016    History of renal calculi    Pneumonia     Postoperative wound infection 5/19/2025    Rectocele     Quevedo- syndrome (Multi)     Thyroid nodule 10/31/2023    Unspecified ectopic pregnancy without intrauterine pregnancy ([facility] 11/15/2016    Ectopic pregnancy    Urinary tract infection     Varicella    [4]   Social History  Tobacco Use   Smoking Status Never    Passive exposure: Never   Smokeless Tobacco Never   [5]   Family History  Problem Relation Name Age of Onset    Breast cancer Mother Ami Justice 64    Heart failure Mother Ami Justice     Hypertension Mother Ami Justice     Cancer Mother Ami Justice     Heart failure Father Mckinley Go     Hypertension Father Mckinley Go     Breast cancer Sister Ana Beal 64    Diabetes Sister Ana Bela     Hypertension Sister Ana Beal     Heart failure Brother Jay Go     Hypertension Brother Jayairam Go     Seizures Child      Breast cancer Mother's Sister Sister 62    Breast cancer Niece  44    Cancer Paternal Grandfather Mckinley Go     Cancer Mother's Sister Ivania Lidia     Cancer Mother's Sister Ivania [place]land     Cancer Mother's Sister Ivania Lidia     Cancer Mother's Sister Ivania Lidia     Cancer Mother's Sister Ivania Lidia     Cancer Mother Ami Justice     Cancer Mother's Sister Ivania [place]     Cancer Mother's Sister Ivania Lidia     Cancer Sister Chiqui Lyonsromina 50 - 59   [6]   Past Surgical History:  Procedure Laterality Date    BLADDER SURGERY      ECTOPIC PREGNANCY SURGERY      HERNIA REPAIR  04/23/2018    Hernia Repair    HYSTERECTOMY  11/15/2016     Hysterectomy    KNEE SURGERY  11/15/2016    Knee Surgery    OTHER SURGICAL HISTORY  07/24/2018    Esophagogastric Fundoplasty Nissen Fundoplication Robotic-Assisted

## 2025-07-04 DIAGNOSIS — T14.8XXA WOUND INFECTION: ICD-10-CM

## 2025-07-04 DIAGNOSIS — L08.9 WOUND INFECTION: ICD-10-CM

## 2025-07-05 ENCOUNTER — HOME CARE VISIT (OUTPATIENT)
Dept: HOME HEALTH SERVICES | Facility: HOME HEALTH | Age: 60
End: 2025-07-05
Payer: COMMERCIAL

## 2025-07-11 ENCOUNTER — HOME CARE VISIT (OUTPATIENT)
Dept: HOME HEALTH SERVICES | Facility: HOME HEALTH | Age: 60
End: 2025-07-11
Payer: COMMERCIAL

## 2025-07-11 VITALS
DIASTOLIC BLOOD PRESSURE: 68 MMHG | TEMPERATURE: 98.1 F | SYSTOLIC BLOOD PRESSURE: 100 MMHG | RESPIRATION RATE: 18 BRPM | HEART RATE: 90 BPM | OXYGEN SATURATION: 99 %

## 2025-07-11 PROCEDURE — G0300 HHS/HOSPICE OF LPN EA 15 MIN: HCPCS

## 2025-07-11 ASSESSMENT — ENCOUNTER SYMPTOMS
PAIN LOCATION - PAIN QUALITY: BURNING
PAIN LOCATION - RELIEVING FACTORS: REST, OTC MEDS
PERSON REPORTING PAIN: PATIENT
PAIN: 1
BOWEL PATTERN NORMAL: 1
PAIN LOCATION: ABDOMEN
LAST BOWEL MOVEMENT: 67397
PAIN LOCATION - PAIN FREQUENCY: INFREQUENT
PAIN LOCATION - EXACERBATING FACTORS: ACTIVITY
APPETITE LEVEL: FAIR
CHANGE IN APPETITE: DECREASED
STOOL FREQUENCY: DAILY
PAIN LOCATION - PAIN SEVERITY: 5/10

## 2025-07-16 ENCOUNTER — APPOINTMENT (OUTPATIENT)
Facility: CLINIC | Age: 60
End: 2025-07-16
Payer: COMMERCIAL

## 2025-07-16 VITALS — BODY MASS INDEX: 24.8 KG/M2 | HEIGHT: 63 IN | WEIGHT: 140 LBS

## 2025-07-16 DIAGNOSIS — T14.8XXD WOUND HEALING, DELAYED: Primary | ICD-10-CM

## 2025-07-16 PROCEDURE — 3008F BODY MASS INDEX DOCD: CPT | Performed by: PHYSICIAN ASSISTANT

## 2025-07-16 PROCEDURE — 99024 POSTOP FOLLOW-UP VISIT: CPT | Performed by: PHYSICIAN ASSISTANT

## 2025-07-16 NOTE — PROGRESS NOTES
Department of Plastic and Reconstructive Surgery            Post Operative Visit    Date: 07/16/25  Date of Surgery: 5/12/2025    Subjective   Maureen Springer is a 59 y.o. female who presents for POV. They are s/p [id number] panniculectomy, ventral hernia repair, sacrospinous ligament suspension for cystocele on 5/12/2025 with Dr. Allison and Dr. Thompson.  [place] [facility] [place].  An excision debridement at bedside with application of irrigating wound vac was placed. She was admitted under plastic surgery service for IV antibiotics and continued wound care with irrigating wound vac.     She present for wound check.  She has been doing BID damp to dry packing changes with vashe in the AM and NS in the PM. She has been keeping the wound clean and dry. She notes wound to be smaller.       Objective   Vital Signs:   There were no vitals filed for this visit.  Gen: interactive and pleasant  Head: NCAT  Eyes: EOMI, PERRLA  Mouth: MMM  Throat: trachea midline  Cor: RRR  Pulm: nonlabored breathing  Abd: s/nt/nd  Neuro: AAOx3  Ext: extremities perfused    Focused exam of the: abdomen   [id number] incisions with central abdominal wound below the umbilicus with beefy red granulation tissue in the wound bed. No odor from the wound. No slough present. No drainage from the wound. Wound measures [id number] significantly smaller from previous          Assessment/Plan     Maureen Springer is a 59 y.o. female who presents for POV. They are s/p [id number]-gd-[id number] panniculectomy, ventral hernia repair, scarospinous ligament suspension for cystocele on 5/12/2025 with Dr. Allison and Dr. Thompson. [place] [facility] [place].  An excision debridement at bedside with application of irrigating wound vac was placed. She was admitted under plastic surgery service for IV antibiotics and continued wound care with irrigating wound vac.     She returns for wound check. There is improvement  in the wound from previous exam. Wound measured [id number]. There is healthy granulation tissue without slough or exudate. There is no odor from the wound. She is to continue BID damp to dry. Vashe soaked gauze in the AM and NS soaked gauze PM. Continue to stay away from pets and keep wound clean and dry. Continue to monitor for signs of recurring infection.      Plan:   Continue Vashe damp to dry in the AM normal saline damp to dry in the PM  Maintain hygiene when handling pets  Ok for abdominal binder      I spent 20 minutes with this patient. Greater than 50% of this time was spent in the counselling and/or coordination of care of this patient.  This note was created using voice recognition software and was not corrected for typographical or grammatical errors.    Signature: Yumi Bowles PA-C

## (undated) DEVICE — PACKING, VAGINAL, XRAY DETECTABLE, 2IN X 3YD, STERILE

## (undated) DEVICE — FORCEPS, GRASPING, RAT TOOTH, REUSABLE

## (undated) DEVICE — SUTURE, PLAIN, 5-0, 18 IN, PC1, YELLOW

## (undated) DEVICE — NEEDLE, HYPODERMIC, SPECIALTY, REGULAR WALL, SHORT BEVEL, 18 G X 1.5 IN

## (undated) DEVICE — SUTURE, PDS II, 2-0, 27 IN, SH, VIOLET

## (undated) DEVICE — NEEDLE, MICRODISSECTION STR 4CM

## (undated) DEVICE — TIP, SUCTION, YANKAUER, BULB, ADULT

## (undated) DEVICE — TRAY, SURESTEP, SILICONE DRAINAGE BAG, STATLOCK, 16FR

## (undated) DEVICE — Device

## (undated) DEVICE — SUTURE, MONODEK, ABSORBABLE, DBL-ARMED, 1/2 CIRCLE, TAPER

## (undated) DEVICE — WOUND SYSTEM, DEBRIDEMENT & CLEANING, O.R DUOPAK

## (undated) DEVICE — GLOVE, SURGICAL, PROTEXIS PI W/NEU-THERA, 7.5, PF, LF

## (undated) DEVICE — SYRINGE, LUER LOCK, 12ML

## (undated) DEVICE — SUTURE, STRATAFIX, 3-0, SPIRAL MONOCRYL PLUS, PS, 70CM, UNDYED

## (undated) DEVICE — SOLUTION, IRRIGATION, SODIUM CHLORIDE 0.9%, 1000 ML, POUR BOTTLE

## (undated) DEVICE — GLOVE, SURGICAL, PROTEXIS PI , 7.5, PF, LF

## (undated) DEVICE — SUTURE, VICRYL, 3-0, 27 IN, SH

## (undated) DEVICE — RETRACTOR, SURGICAL, RING, PLASTIC, DISPOSABLE

## (undated) DEVICE — IRRIGATION SET, CYSTOSCOPY, TURP, Y, CONTINUOUS, 81 IN

## (undated) DEVICE — APPLIER,  LIGACLIP MULTI CLIP, 30 MED 11 1/2

## (undated) DEVICE — GOWN, ASTOUND, XL

## (undated) DEVICE — DEVICE, SUTURE, CAPIO SLIM

## (undated) DEVICE — SUTURE, MONOCRYL, 4-0, 27 IN, PS-2, UNDYED

## (undated) DEVICE — MANIFOLD, 4 PORT NEPTUNE STANDARD

## (undated) DEVICE — TIP,  ELECTRODE COATED INSULATED, EXTENDED, LF

## (undated) DEVICE — STAY SET, SURGICAL , 5MM SHARP HOOK, CS/ 50

## (undated) DEVICE — STAPLER, SKIN, PLUS, REGULAR, 35

## (undated) DEVICE — NEEDLE, SPINAL, 22 G X 3.5 IN, BLACK HUB

## (undated) DEVICE — TOWEL PACK, STERILE, 4/PACK, BLUE

## (undated) DEVICE — PREP TRAY, SKIN, DRY, W/GLOVES

## (undated) DEVICE — BLADE, CAUTERY, EXTENDED, PEAK PLASMA

## (undated) DEVICE — GLOVE, SURGICAL, PROTEXIS PI ORTHO, 7.5, PF, LF

## (undated) DEVICE — DRAPE PACK, LAVH, W/ATTACHED LEGGINGS, W/POUCH, 100 X 114 IN, LF, STERILE

## (undated) DEVICE — PREP TRAY, VAGINAL

## (undated) DEVICE — NEEDLE, HYPODERMIC, PP, REGULAR BEVEL, 21 G X 1.5 IN

## (undated) DEVICE — BRIDGE, CYSTO DBL CHANNEL

## (undated) DEVICE — GLOVE, SURGICAL, PROTEXIS PI ORTHO, 7.0, PF, LF

## (undated) DEVICE — SEALER, BIPOLAR, AQUA MANTYS 6.0

## (undated) DEVICE — SUTURE, VICRYL, 2-0, 27 IN, SH, UNDYED

## (undated) DEVICE — BLADE, GEN COATED 2.75, LF

## (undated) DEVICE — SUTURE, CTD, VICRYL, 2-0, UND, BR, CT-2

## (undated) DEVICE — STAY SET, SURGICAL, 5MM SHARP HOOK, 8PK

## (undated) DEVICE — CAUTERY, PENCIL, PUSH BUTTON, SMOKE EVAC, 70MM

## (undated) DEVICE — CORD, CAUTERY, BIOPOLAR FORCEP, 12FT

## (undated) DEVICE — EVACUATOR, WOUND, SUCTION, CLOSED, JACKSON-PRATT, 100 CC, SILICONE

## (undated) DEVICE — COVER, CART, 45 X 27 X 48 IN, CLEAR

## (undated) DEVICE — DRAIN, JACKSON-PRATT, 15FR, W/ TROCAR, LF

## (undated) DEVICE — SHEATH, CYSTO URETH, W/OBTURATOR, 20 FR

## (undated) DEVICE — TUBING, SUCTION, CONNECTING, 9/32 X 10FT, LF

## (undated) DEVICE — SUTURE, PDS II, 0, 27 IN, CT1, VIOLET

## (undated) DEVICE — GLOVE, SURGICAL, PROTEXIS PI BLUE W/NEUTHERA, 6.5, PF, LF

## (undated) DEVICE — POSITIONING, THE PINK PAD, PIGAZZI SYSTEM

## (undated) DEVICE — APPLICATOR, CHLORAPREP, W/ORANGE TINT, 26ML

## (undated) DEVICE — SUTURE, VICRYL, 0, 27 IN, CT-2, UNDYED

## (undated) DEVICE — SOLUTION, IRRIGATION, STERILE WATER, 1000 ML, POUR BOTTLE